# Patient Record
Sex: MALE | Race: WHITE | NOT HISPANIC OR LATINO | Employment: UNEMPLOYED | URBAN - METROPOLITAN AREA
[De-identification: names, ages, dates, MRNs, and addresses within clinical notes are randomized per-mention and may not be internally consistent; named-entity substitution may affect disease eponyms.]

---

## 2020-02-04 ENCOUNTER — HOSPITAL ENCOUNTER (INPATIENT)
Facility: HOSPITAL | Age: 55
LOS: 3 days | Discharge: HOME | DRG: 189 | End: 2020-02-07
Attending: EMERGENCY MEDICINE | Admitting: EMERGENCY MEDICINE
Payer: MEDICAID

## 2020-02-04 DIAGNOSIS — J44.1 COPD WITH ACUTE EXACERBATION: Primary | ICD-10-CM

## 2020-02-04 DIAGNOSIS — Z72.0 TOBACCO ABUSE: ICD-10-CM

## 2020-02-04 DIAGNOSIS — J96.01 ACUTE RESPIRATORY FAILURE WITH HYPOXIA: ICD-10-CM

## 2020-02-04 DIAGNOSIS — J96.01 ACUTE RESPIRATORY FAILURE WITH HYPOXIA AND HYPERCAPNIA: ICD-10-CM

## 2020-02-04 DIAGNOSIS — I10 ESSENTIAL HYPERTENSION: ICD-10-CM

## 2020-02-04 DIAGNOSIS — R05.9 COUGH: ICD-10-CM

## 2020-02-04 DIAGNOSIS — J96.02 ACUTE RESPIRATORY FAILURE WITH HYPOXIA AND HYPERCAPNIA: ICD-10-CM

## 2020-02-04 DIAGNOSIS — R09.02 HYPOXIA: ICD-10-CM

## 2020-02-04 DIAGNOSIS — J44.1 COPD EXACERBATION: ICD-10-CM

## 2020-02-04 PROBLEM — J44.9 COPD (CHRONIC OBSTRUCTIVE PULMONARY DISEASE): Status: ACTIVE | Noted: 2020-02-04

## 2020-02-04 LAB
ALBUMIN SERPL BCP-MCNC: 3.9 G/DL (ref 3.5–5.2)
ALLENS TEST: ABNORMAL
ALP SERPL-CCNC: 80 U/L (ref 55–135)
ALT SERPL W/O P-5'-P-CCNC: 12 U/L (ref 10–44)
ANION GAP SERPL CALC-SCNC: 9 MMOL/L (ref 8–16)
AST SERPL-CCNC: 11 U/L (ref 10–40)
BASOPHILS # BLD AUTO: 0.06 K/UL (ref 0–0.2)
BASOPHILS NFR BLD: 0.7 % (ref 0–1.9)
BILIRUB SERPL-MCNC: 0.3 MG/DL (ref 0.1–1)
BNP SERPL-MCNC: <10 PG/ML (ref 0–99)
BUN SERPL-MCNC: 19 MG/DL (ref 6–20)
CALCIUM SERPL-MCNC: 9.3 MG/DL (ref 8.7–10.5)
CHLORIDE SERPL-SCNC: 98 MMOL/L (ref 95–110)
CO2 SERPL-SCNC: 38 MMOL/L (ref 23–29)
CREAT SERPL-MCNC: 1 MG/DL (ref 0.5–1.4)
D DIMER PPP IA.FEU-MCNC: 0.3 MG/L FEU
DELSYS: ABNORMAL
DIFFERENTIAL METHOD: ABNORMAL
EOSINOPHIL # BLD AUTO: 0.3 K/UL (ref 0–0.5)
EOSINOPHIL NFR BLD: 3 % (ref 0–8)
ERYTHROCYTE [DISTWIDTH] IN BLOOD BY AUTOMATED COUNT: 13.8 % (ref 11.5–14.5)
EST. GFR  (AFRICAN AMERICAN): >60 ML/MIN/1.73 M^2
EST. GFR  (NON AFRICAN AMERICAN): >60 ML/MIN/1.73 M^2
FIO2: 94
FLOW: 4
GLUCOSE SERPL-MCNC: 114 MG/DL (ref 70–110)
HCO3 UR-SCNC: 38.2 MMOL/L (ref 24–28)
HCT VFR BLD AUTO: 52.9 % (ref 40–54)
HGB BLD-MCNC: 15.4 G/DL (ref 14–18)
IMM GRANULOCYTES # BLD AUTO: 0.05 K/UL (ref 0–0.04)
IMM GRANULOCYTES NFR BLD AUTO: 0.6 % (ref 0–0.5)
LYMPHOCYTES # BLD AUTO: 1.3 K/UL (ref 1–4.8)
LYMPHOCYTES NFR BLD: 14.9 % (ref 18–48)
MCH RBC QN AUTO: 28.9 PG (ref 27–31)
MCHC RBC AUTO-ENTMCNC: 29.1 G/DL (ref 32–36)
MCV RBC AUTO: 99 FL (ref 82–98)
MODE: ABNORMAL
MONOCYTES # BLD AUTO: 0.7 K/UL (ref 0.3–1)
MONOCYTES NFR BLD: 7.9 % (ref 4–15)
NEUTROPHILS # BLD AUTO: 6.3 K/UL (ref 1.8–7.7)
NEUTROPHILS NFR BLD: 72.9 % (ref 38–73)
NRBC BLD-RTO: 0 /100 WBC
PCO2 BLDA: 73.2 MMHG (ref 35–45)
PH SMN: 7.33 [PH] (ref 7.35–7.45)
PLATELET # BLD AUTO: 259 K/UL (ref 150–350)
PMV BLD AUTO: 9.6 FL (ref 9.2–12.9)
PO2 BLDA: 71 MMHG (ref 80–100)
POC BE: 12 MMOL/L
POC SATURATED O2: 92 % (ref 95–100)
POC TCO2: 40 MMOL/L (ref 23–27)
POCT GLUCOSE: 156 MG/DL (ref 70–110)
POTASSIUM SERPL-SCNC: 4.2 MMOL/L (ref 3.5–5.1)
PROT SERPL-MCNC: 7.5 G/DL (ref 6–8.4)
RBC # BLD AUTO: 5.33 M/UL (ref 4.6–6.2)
SAMPLE: ABNORMAL
SITE: ABNORMAL
SODIUM SERPL-SCNC: 145 MMOL/L (ref 136–145)
TROPONIN I SERPL DL<=0.01 NG/ML-MCNC: <0.006 NG/ML (ref 0–0.03)
WBC # BLD AUTO: 8.63 K/UL (ref 3.9–12.7)

## 2020-02-04 PROCEDURE — 36600 WITHDRAWAL OF ARTERIAL BLOOD: CPT

## 2020-02-04 PROCEDURE — 83880 ASSAY OF NATRIURETIC PEPTIDE: CPT

## 2020-02-04 PROCEDURE — 99222 1ST HOSP IP/OBS MODERATE 55: CPT | Mod: ,,, | Performed by: INTERNAL MEDICINE

## 2020-02-04 PROCEDURE — 11000001 HC ACUTE MED/SURG PRIVATE ROOM

## 2020-02-04 PROCEDURE — 25000242 PHARM REV CODE 250 ALT 637 W/ HCPCS: Performed by: INTERNAL MEDICINE

## 2020-02-04 PROCEDURE — 93005 ELECTROCARDIOGRAM TRACING: CPT

## 2020-02-04 PROCEDURE — 80053 COMPREHEN METABOLIC PANEL: CPT

## 2020-02-04 PROCEDURE — 27000221 HC OXYGEN, UP TO 24 HOURS

## 2020-02-04 PROCEDURE — 85379 FIBRIN DEGRADATION QUANT: CPT

## 2020-02-04 PROCEDURE — 94761 N-INVAS EAR/PLS OXIMETRY MLT: CPT

## 2020-02-04 PROCEDURE — 94640 AIRWAY INHALATION TREATMENT: CPT

## 2020-02-04 PROCEDURE — 82803 BLOOD GASES ANY COMBINATION: CPT

## 2020-02-04 PROCEDURE — 99285 EMERGENCY DEPT VISIT HI MDM: CPT | Mod: 25

## 2020-02-04 PROCEDURE — 84484 ASSAY OF TROPONIN QUANT: CPT

## 2020-02-04 PROCEDURE — 94760 N-INVAS EAR/PLS OXIMETRY 1: CPT

## 2020-02-04 PROCEDURE — 63600175 PHARM REV CODE 636 W HCPCS: Performed by: INTERNAL MEDICINE

## 2020-02-04 PROCEDURE — 25000242 PHARM REV CODE 250 ALT 637 W/ HCPCS: Performed by: PHYSICIAN ASSISTANT

## 2020-02-04 PROCEDURE — 99222 PR INITIAL HOSPITAL CARE,LEVL II: ICD-10-PCS | Mod: ,,, | Performed by: INTERNAL MEDICINE

## 2020-02-04 PROCEDURE — 25000003 PHARM REV CODE 250: Performed by: PHYSICIAN ASSISTANT

## 2020-02-04 PROCEDURE — 63600175 PHARM REV CODE 636 W HCPCS: Performed by: PHYSICIAN ASSISTANT

## 2020-02-04 PROCEDURE — 25000003 PHARM REV CODE 250: Performed by: INTERNAL MEDICINE

## 2020-02-04 PROCEDURE — 93010 ELECTROCARDIOGRAM REPORT: CPT | Mod: ,,, | Performed by: INTERNAL MEDICINE

## 2020-02-04 PROCEDURE — 99900035 HC TECH TIME PER 15 MIN (STAT)

## 2020-02-04 PROCEDURE — 99285 PR EMERGENCY DEPT VISIT,LEVEL V: ICD-10-PCS | Mod: ,,, | Performed by: PHYSICIAN ASSISTANT

## 2020-02-04 PROCEDURE — 99285 EMERGENCY DEPT VISIT HI MDM: CPT | Mod: ,,, | Performed by: PHYSICIAN ASSISTANT

## 2020-02-04 PROCEDURE — 93010 EKG 12-LEAD: ICD-10-PCS | Mod: ,,, | Performed by: INTERNAL MEDICINE

## 2020-02-04 PROCEDURE — 85025 COMPLETE CBC W/AUTO DIFF WBC: CPT

## 2020-02-04 RX ORDER — ACETAMINOPHEN 325 MG/1
650 TABLET ORAL EVERY 6 HOURS PRN
Status: DISCONTINUED | OUTPATIENT
Start: 2020-02-04 | End: 2020-02-07 | Stop reason: HOSPADM

## 2020-02-04 RX ORDER — TIOTROPIUM BROMIDE 18 UG/1
1 CAPSULE ORAL; RESPIRATORY (INHALATION) DAILY
Status: DISCONTINUED | OUTPATIENT
Start: 2020-02-04 | End: 2020-02-07 | Stop reason: HOSPADM

## 2020-02-04 RX ORDER — GLUCAGON 1 MG
1 KIT INJECTION
Status: DISCONTINUED | OUTPATIENT
Start: 2020-02-04 | End: 2020-02-07 | Stop reason: HOSPADM

## 2020-02-04 RX ORDER — PREDNISONE 20 MG/1
40 TABLET ORAL DAILY
Qty: 8 TABLET | Refills: 0 | Status: SHIPPED | OUTPATIENT
Start: 2020-02-05 | End: 2020-02-07 | Stop reason: SDUPTHER

## 2020-02-04 RX ORDER — PREDNISONE 10 MG/1
40 TABLET ORAL DAILY
Status: DISCONTINUED | OUTPATIENT
Start: 2020-02-05 | End: 2020-02-07 | Stop reason: HOSPADM

## 2020-02-04 RX ORDER — BENZONATATE 100 MG/1
100 CAPSULE ORAL
Status: COMPLETED | OUTPATIENT
Start: 2020-02-04 | End: 2020-02-04

## 2020-02-04 RX ORDER — BENZONATATE 100 MG/1
200 CAPSULE ORAL 3 TIMES DAILY PRN
Status: DISCONTINUED | OUTPATIENT
Start: 2020-02-05 | End: 2020-02-07 | Stop reason: HOSPADM

## 2020-02-04 RX ORDER — IPRATROPIUM BROMIDE AND ALBUTEROL SULFATE 2.5; .5 MG/3ML; MG/3ML
3 SOLUTION RESPIRATORY (INHALATION)
Status: COMPLETED | OUTPATIENT
Start: 2020-02-04 | End: 2020-02-04

## 2020-02-04 RX ORDER — INSULIN ASPART 100 [IU]/ML
0-5 INJECTION, SOLUTION INTRAVENOUS; SUBCUTANEOUS
Status: DISCONTINUED | OUTPATIENT
Start: 2020-02-04 | End: 2020-02-05

## 2020-02-04 RX ORDER — BENZONATATE 100 MG/1
200 CAPSULE ORAL 3 TIMES DAILY
Status: COMPLETED | OUTPATIENT
Start: 2020-02-04 | End: 2020-02-05

## 2020-02-04 RX ORDER — TIOTROPIUM BROMIDE 18 UG/1
18 CAPSULE ORAL; RESPIRATORY (INHALATION) DAILY
Qty: 30 CAPSULE | Refills: 0 | Status: SHIPPED | OUTPATIENT
Start: 2020-02-04 | End: 2020-02-07 | Stop reason: SDUPTHER

## 2020-02-04 RX ORDER — LISINOPRIL 20 MG/1
20 TABLET ORAL DAILY
Status: ON HOLD | COMMUNITY
End: 2020-02-07 | Stop reason: SDUPTHER

## 2020-02-04 RX ORDER — ALBUTEROL SULFATE 90 UG/1
2 AEROSOL, METERED RESPIRATORY (INHALATION) EVERY 6 HOURS PRN
Status: DISCONTINUED | OUTPATIENT
Start: 2020-02-04 | End: 2020-02-07 | Stop reason: HOSPADM

## 2020-02-04 RX ORDER — ALBUTEROL SULFATE 0.63 MG/3ML
0.63 SOLUTION RESPIRATORY (INHALATION) EVERY 6 HOURS PRN
Status: ON HOLD | COMMUNITY
End: 2020-02-07 | Stop reason: HOSPADM

## 2020-02-04 RX ORDER — ONDANSETRON 8 MG/1
8 TABLET, ORALLY DISINTEGRATING ORAL EVERY 8 HOURS PRN
Status: DISCONTINUED | OUTPATIENT
Start: 2020-02-04 | End: 2020-02-07 | Stop reason: HOSPADM

## 2020-02-04 RX ORDER — PREDNISONE 20 MG/1
40 TABLET ORAL
Status: COMPLETED | OUTPATIENT
Start: 2020-02-04 | End: 2020-02-04

## 2020-02-04 RX ORDER — SODIUM CHLORIDE 0.9 % (FLUSH) 0.9 %
10 SYRINGE (ML) INJECTION
Status: DISCONTINUED | OUTPATIENT
Start: 2020-02-04 | End: 2020-02-07 | Stop reason: HOSPADM

## 2020-02-04 RX ORDER — HYDROCHLOROTHIAZIDE 25 MG/1
25 TABLET ORAL DAILY
Status: DISCONTINUED | OUTPATIENT
Start: 2020-02-05 | End: 2020-02-07 | Stop reason: HOSPADM

## 2020-02-04 RX ORDER — IBUPROFEN 200 MG
24 TABLET ORAL
Status: DISCONTINUED | OUTPATIENT
Start: 2020-02-04 | End: 2020-02-07 | Stop reason: HOSPADM

## 2020-02-04 RX ORDER — AZITHROMYCIN 250 MG/1
250 TABLET, FILM COATED ORAL DAILY
Qty: 6 TABLET | Refills: 0 | Status: SHIPPED | OUTPATIENT
Start: 2020-02-04 | End: 2020-02-07 | Stop reason: HOSPADM

## 2020-02-04 RX ORDER — HYDROCHLOROTHIAZIDE 25 MG/1
25 TABLET ORAL DAILY
Status: ON HOLD | COMMUNITY
End: 2020-02-07 | Stop reason: HOSPADM

## 2020-02-04 RX ORDER — IPRATROPIUM BROMIDE AND ALBUTEROL SULFATE 2.5; .5 MG/3ML; MG/3ML
3 SOLUTION RESPIRATORY (INHALATION) EVERY 4 HOURS
Status: DISCONTINUED | OUTPATIENT
Start: 2020-02-04 | End: 2020-02-07

## 2020-02-04 RX ORDER — IBUPROFEN 200 MG
16 TABLET ORAL
Status: DISCONTINUED | OUTPATIENT
Start: 2020-02-04 | End: 2020-02-07 | Stop reason: HOSPADM

## 2020-02-04 RX ORDER — ALBUTEROL SULFATE 90 UG/1
1-2 AEROSOL, METERED RESPIRATORY (INHALATION) EVERY 6 HOURS PRN
Qty: 6.7 G | Refills: 2 | Status: SHIPPED | OUTPATIENT
Start: 2020-02-04 | End: 2020-02-07 | Stop reason: HOSPADM

## 2020-02-04 RX ORDER — ENOXAPARIN SODIUM 100 MG/ML
40 INJECTION SUBCUTANEOUS EVERY 24 HOURS
Status: DISCONTINUED | OUTPATIENT
Start: 2020-02-04 | End: 2020-02-07 | Stop reason: HOSPADM

## 2020-02-04 RX ORDER — LISINOPRIL 20 MG/1
20 TABLET ORAL DAILY
Status: DISCONTINUED | OUTPATIENT
Start: 2020-02-05 | End: 2020-02-04

## 2020-02-04 RX ADMIN — ALBUTEROL SULFATE 2 PUFF: 90 AEROSOL, METERED RESPIRATORY (INHALATION) at 10:02

## 2020-02-04 RX ADMIN — IPRATROPIUM BROMIDE AND ALBUTEROL SULFATE 3 ML: .5; 3 SOLUTION RESPIRATORY (INHALATION) at 12:02

## 2020-02-04 RX ADMIN — BENZONATATE 200 MG: 100 CAPSULE ORAL at 10:02

## 2020-02-04 RX ADMIN — IPRATROPIUM BROMIDE AND ALBUTEROL SULFATE 3 ML: .5; 3 SOLUTION RESPIRATORY (INHALATION) at 08:02

## 2020-02-04 RX ADMIN — ENOXAPARIN SODIUM 40 MG: 100 INJECTION SUBCUTANEOUS at 05:02

## 2020-02-04 RX ADMIN — PREDNISONE 40 MG: 20 TABLET ORAL at 08:02

## 2020-02-04 RX ADMIN — BENZONATATE 100 MG: 100 CAPSULE ORAL at 08:02

## 2020-02-04 RX ADMIN — BENZONATATE 200 MG: 100 CAPSULE ORAL at 02:02

## 2020-02-04 RX ADMIN — TIOTROPIUM BROMIDE 18 MCG: 18 CAPSULE ORAL; RESPIRATORY (INHALATION) at 05:02

## 2020-02-04 RX ADMIN — IPRATROPIUM BROMIDE AND ALBUTEROL SULFATE 3 ML: .5; 3 SOLUTION RESPIRATORY (INHALATION) at 07:02

## 2020-02-04 RX ADMIN — IPRATROPIUM BROMIDE AND ALBUTEROL SULFATE 3 ML: .5; 3 SOLUTION RESPIRATORY (INHALATION) at 05:02

## 2020-02-04 NOTE — ED PROVIDER NOTES
Encounter Date: 2/4/2020       History     Chief Complaint   Patient presents with    URI     ran out inhaler,      54-year-old male with COPD and hypertension presents for a cough productive of yellow sputum for 1 week.  He ran out of his albuterol inhaler which was helpful for his cough.  He reports associated wheezing.  He denies shortness of breath, chest pain, fever, leg swelling, sinus congestion, sore throat or other symptoms. He is currently living at the [facility], he denies any known sick contacts or recent travel.        Review of patient's allergies indicates:   Allergen Reactions    Codeine Rash    Penicillins Rash     Past Medical History:   Diagnosis Date    COPD (chronic obstructive pulmonary disease) 2/4/2020    Hypertension      Past Surgical History:   Procedure Laterality Date    HERNIA REPAIR      LEG SURGERY       History reviewed. No pertinent family history.  Social History     Tobacco Use    Smoking status: Current Every Day Smoker     Packs/day: 0.50     Types: Cigarettes    Smokeless tobacco: Never Used    Tobacco comment: 10 cigarettes per day   Substance Use Topics    Alcohol use: Never     Frequency: Never     Comment: In [facility] [place] [facility] [employer] [facility]    Drug use: Not on file     Review of Systems   Constitutional: Negative for chills, fatigue and fever.   HENT: Negative for congestion, rhinorrhea and sore throat.    Respiratory: Positive for cough and wheezing. Negative for shortness of breath.    Cardiovascular: Negative for chest pain, palpitations and leg swelling.   Gastrointestinal: Negative for nausea.   Genitourinary: Negative for dysuria.   Musculoskeletal: Negative for back pain.   Skin: Negative for rash.   Allergic/Immunologic: Negative for immunocompromised state.   Neurological: Negative for weakness.   Hematological: Does not bruise/bleed easily.       Physical Exam     Initial Vitals [02/04/20 0752]   BP Pulse Resp Temp SpO2   (!) 154/85 105 (!) 22 97.9 °F (36.6 °C) 95 %       MAP       --         Physical Exam    Nursing note and vitals reviewed.  Constitutional: He appears well-developed and well-nourished. He is not diaphoretic. No distress.   HENT:   Head: Normocephalic and atraumatic.   Eyes: EOM are normal. Pupils are equal, round, and reactive to light.   Neck: Normal range of motion. Neck supple.   Cardiovascular: Normal rate, regular rhythm, normal heart sounds and intact distal pulses. Exam reveals no gallop and no friction rub.    No murmur heard.  No lower extremity edema, erythema, tenderness or warmth   Pulmonary/Chest: No respiratory distress. He has wheezes. He has no rhonchi. He has no rales. He exhibits no tenderness.   Speaking in full sentences without difficulty   Musculoskeletal: Normal range of motion.   Neurological: He is alert and oriented to person, place, and time.   Skin: Skin is warm and dry.   Psychiatric: He has a normal mood and affect.         ED Course   Procedures  Labs Reviewed   CBC W/ AUTO DIFFERENTIAL - Abnormal; Notable for the following components:       Result Value    Mean Corpuscular Volume 99 (*)     Mean Corpuscular Hemoglobin Conc 29.1 (*)     Immature Granulocytes 0.6 (*)     Immature Grans (Abs) 0.05 (*)     Lymph% 14.9 (*)     All other components within normal limits   COMPREHENSIVE METABOLIC PANEL - Abnormal; Notable for the following components:    CO2 38 (*)     Glucose 114 (*)     All other components within normal limits   ISTAT PROCEDURE - Abnormal; Notable for the following components:    POC PH 7.325 (*)     POC PCO2 73.2 (*)     POC PO2 71 (*)     POC HCO3 38.2 (*)     POC SATURATED O2 92 (*)     POC TCO2 40 (*)     All other components within normal limits   INFLUENZA A & B BY MOLECULAR   TROPONIN I   B-TYPE NATRIURETIC PEPTIDE   D DIMER, QUANTITATIVE     EKG Readings: (Independently Interpreted)   Initial Reading: No STEMI. Rhythm: Normal Sinus Rhythm. Heart Rate: 96. Ectopy: No Ectopy. Conduction: Normal. ST Segments:  Normal ST Segments. T Waves: Normal. Clinical Impression: Normal Sinus Rhythm     ECG Results          EKG 12-lead (In process)  Result time 02/04/20 11:52:03    In process by Interface, Lab In [facility] (02/04/20 11:52:03)                 Narrative:    Test Reason : R09.02,    Vent. Rate : 096 BPM     Atrial Rate : 096 BPM     P-R Int : 126 ms          QRS Dur : 086 ms      QT Int : 328 ms       P-R-T Axes : 064 079 014 degrees     QTc Int : 414 ms    Age and gender specific analysis  Normal sinus rhythm  Normal ECG  No previous ECGs available    Referred By: [email or web address]   SELF           Confirmed By:                             Imaging Results          X-Ray Chest PA And Lateral (Final result)  Result time 02/04/20 08:23:28    Final result by Munira Casas MD (02/04/20 08:23:28)                 Impression:      I detect no convincing evidence of disease allowing for mild elevation of the right hemidiaphragm.      Electronically signed by: Munira Casas MD  Date:    02/04/2020  Time:    08:23             Narrative:    EXAMINATION:  XR CHEST PA AND LATERAL    CLINICAL HISTORY:  Cough    TECHNIQUE:  PA and lateral views of the chest were performed.    COMPARISON:  None    FINDINGS:  Mediastinal structures are midline. Cardiac silhouette and pulmonary vascular distribution are normal.    There is mild elevation of the right hemidiaphragm possibly due to eventration.  Allowing for this, pulmonary volumes appear unremarkable.    Taking both views into account I detect no convincing evidence of pneumonia or other pulmonary disease in this patient with a history of cough.    I detect no pleural fluid, lymph node enlargement, cardiac decompensation, pneumothorax, pneumomediastinum, pneumoperitoneum or significant osseous abnormality.  Degenerative changes are present at the acromioclavicular joints.                                 Medical Decision Making:   History:   Old Medical Records: I decided to obtain old medical  records.  Initial Assessment:   54-year-old male COPD presenting for cough for 1 week.  Denies any shortness of breath, chest pain or fevers /85, mildly tachycardic at 105, O2 saturation 95% on room air.  He is not tachypneic on my exam and is speaking in full sentences without difficulty.  Lungs notable for expiratory wheezes.  Differential Diagnosis:   COPD exacerbation  URI  I doubt pneumonia  I considered cardiac etiology but feel this is unlikely given no history, no chest pain and no signs of fluid overload  I considered influenza but I feel this is unlikely given no fever  Independently Interpreted Test(s):   I have ordered and independently interpreted X-rays - see summary below.  I have ordered and independently interpreted EKG Reading(s) - see prior notes  Clinical Tests:   Lab Tests: Ordered and Reviewed  Radiological Study: Ordered and Reviewed  Medical Tests: Ordered and Reviewed  ED Management:  Will give prednisone, Tessalon Perle, duo nebs, do chest x-ray and reassess.    Patient reports improvement of cough and wheezing after duo nebs.  I was preparing the patient for discharge when his O2 saturation was noted to be 76% on room air.  This was rechecked multiple times by nursing staff with a good waveform.  I suspect that the triage O2 saturation was inaccurate given his considerable hypoxia currently.  Will place on oxygen, transfer patient to main ED bed, check labs and reassess.      ABG notable for hypercarbia with pCO2 of 73.2.  Hypoxia with saturated O2 of 92 while the patient is on oxygen.  Otherwise his labs are unremarkable with negative D-dimer.  Patient will be admitted to Hospital Medicine for further workup.  Per patient request, I contacted Mr. Goyal at the [facility] [phone] states that the patient will be admitted.  Patient comfortable with admission.  I discussed this patient with my supervising physician.  Other:   I have discussed this case with another health care  provider.       <> Summary of the Discussion: Discussed this patient with [facility] recommends obtaining D-dimer NSAID a CTA given patient's low risk profile.              Attending Attestation:     Physician Attestation Statement for NP/PA:   I discussed this assessment and plan of this patient with the NP/PA, but I did not personally examine the patient. The face to face encounter was performed by the NP/PA.                  ED Course as of Feb 04 1242   Tue Feb 04, 2020   0832 No consolidation or pulmonary edema   X-Ray Chest PA And Lateral [CC]   1022 Patient hypercarbic pCO2 73.2.  Suspect chronic.  P O2 92%, patient currently on oxygen.   ISTAT PROCEDURE(!!) [CC]   1033 No leukocytosis or anemia   CBC auto differential(!) [CC]   1100 Troponin negative   Troponin I [CC]   1100 Hypercarbia, otherwise unremarkable   Comprehensive metabolic panel(!) [CC]   1218 D-dimer negative. I suspect profound hypoxia is secondary to COPD.   D dimer, quantitative [CC]      ED Course User Index  [CC] Miriam Gillespie PA-C                Clinical Impression:       ICD-10-CM ICD-9-CM   1. Hypoxia R09.02 799.02   2. Cough R05 786.2   3. COPD exacerbation J44.1 491.21   4. Acute respiratory failure with hypoxia J96.01 518.81         Disposition:   Disposition: Admitted  Condition: Fair                     Miriam Gillespie PA-C  02/04/20 1240       Gray Baker MD  02/04/20 1242

## 2020-02-04 NOTE — DISCHARGE INSTRUCTIONS
I suspect that your symptoms are caused by COPD which is a chronic lung disease caused by smoking.  COPD causes your lungs to swell which makes breathing more difficult.  I am prescribing a steroid to help improve this as well as an albuterol inhaler and a short course of antibiotics.    Please schedule an appointment with your primary care doctor for follow-up.  If you start to have any worsening shortness of breath, chest pain or high fever please return to the emergency department.

## 2020-02-04 NOTE — ED TRIAGE NOTES
Patient states cough with yellow sputum x 1 month, h/o COPD out of Albuterol x 2 weeks. Currently taking Spiriva(not his rx)  Denies fevers. Denies SOB except at night when lying down

## 2020-02-04 NOTE — ED NOTES
RN at bedside to re-check patient vital signs, patient found to be hypoxic at 77% on room air. NAD noted. Patient denies shortness of breath. Patient placed on 4L NC per RN. PA Link notified.

## 2020-02-04 NOTE — ED NOTES
Patient titrated to 2L NC and maintained oxygen saturation at 88%. PA Link notified and patient titrated to 3L NC.

## 2020-02-04 NOTE — H&P
Hospital Medicine  History and Physical      Patient Name: Kt Lutz  MRN: [id number]  Date of Admission: 2/4/2020     Principal Problem: Acute respiratory failure with hypoxia     Chief Complaint     Shortness of breath    History of Present Illness    Mr. Albright is a 54-year-old man with tobacco abuse, HTN and COPD who presents with shortness of breath. This is his third presentation for similar complaints in the last year, with a most recent admission being around the time of Thanksgiving, at which time he reports presenting with chest pain and SOB and was admitted to [facility] for ACS rule-out culminating in a negative stress test and diagnosis of COPD. He has an extended history of tobacco abuse starting when he was 16 and continuing to today. He currently smokes about 1/2 PPD, though throughout his 30s and 40s smoked upwards of 2-3, essentially continuously. He has been in his usual state of health until one month ago when he developed a nagging dry cough, which worsened over the last week after he ran out of the albuterol prescribed from his recent hospitalization. No sputum changes. He borrowed some spiriva from his sister with no effect. No chest pain, palpitations, syncope, or edema. He does not feel dyspneic No fever, chills, or NS. He lives at the [facility] with numerous sick contacts.    On arrival here he was hypoxic to to 78 on RA, improving to high 90s on 4L O2. His lab workup was largely normal, including D-Dimer at 0.30, with the exception of CO2 elevation at 38 and ABG shows hypoxic and hypercapnic respiratory failure at 7.325/73/71 on 4L via NC. CXR clear. He was given duoneb x3, prednisone, and tessalon. Despite the hypoxia he has remained asymptomatic. His only complaint currently is a desire to leave the ED to smoke. Admission requested for COPD exacerbation.     Review of Systems    Constitutional: Negative for chills, fatigue, fever.   HENT: Negative for sore throat, trouble swallowing.     Eyes: Negative for photophobia, visual disturbance.   Respiratory: +cough Negative for shortness of breath.    Cardiovascular: Negative for chest pain, palpitations, leg swelling.   Gastrointestinal: Negative for abdominal pain, constipation, diarrhea, nausea, vomiting.   Endocrine: Negative for cold intolerance, heat intolerance.   Genitourinary: Negative for dysuria, frequency.   Musculoskeletal: Negative for arthralgias, myalgias.   Skin: Negative for rash, wound, erythema   Neurological: Negative for dizziness, syncope, weakness, light-headedness.   Psychiatric/Behavioral: Negative for confusion, hallucinations, anxiety    Past Medical History:   Diagnosis Date    COPD (chronic obstructive pulmonary disease) 2/4/2020    Hypertension      Past Surgical History:   Procedure Laterality Date    HERNIA REPAIR      LEG SURGERY       History reviewed. No pertinent family history.  Social History     Socioeconomic History    Marital status: Single     Spouse name: Not on file    Number of children: Not on file    Years of education: Not on file    Highest education level: Not on file   Occupational History    Not on file   Social Needs    Financial resource strain: Not on file    Food insecurity:     Worry: Not on file     Inability: Not on file    Transportation needs:     Medical: Not on file     Non-medical: Not on file   Tobacco Use    Smoking status: Current Every Day Smoker     Packs/day: 0.50     Types: Cigarettes    Smokeless tobacco: Never Used    Tobacco comment: 10 cigarettes per day   Substance and Sexual Activity    Alcohol use: Never     Frequency: Never     Comment: In [employer] program    Drug use: Not on file    Sexual activity: Never   Lifestyle    Physical activity:     Days per week: Not on file     Minutes per session: Not on file    Stress: Not on file   Relationships    Social connections:     Talks on phone: Not on file     Gets together: Not on file     Attends  [employer] service: Not on file     Active member of club or organization: Not on file     Attends meetings of clubs or organizations: Not on file     Relationship status: Not on file   Other Topics Concern    Not on file   Social History Narrative    Not on file     Medications  Scheduled Meds:   albuterol-ipratropium  3 mL Nebulization Q4H    benzonatate  200 mg Oral TID    enoxaparin  40 mg Subcutaneous Daily    [START ON 2/5/2020] hydroCHLOROthiazide  25 mg Oral Daily    [START ON 2/5/2020] lisinopril  20 mg Oral Daily    [START ON 2/5/2020] predniSONE  40 mg Oral Daily    tiotropium  1 capsule Inhalation Daily     Continuous Infusions:  PRN Meds:.acetaminophen, albuterol, benzonatate **FOLLOWED BY** [START ON 2/5/2020] benzonatate, Dextrose 10% Bolus, Dextrose 10% Bolus, glucagon (human recombinant), glucose, glucose, insulin aspart U-100, ondansetron, promethazine (PHENERGAN) IVPB, sodium chloride 0.9%    Allergies  Codeine and Penicillins    Physical Examination    Temp:  [97.9 °F (36.6 °C)]   Pulse:  []   Resp:  [16-22]   BP: (118-154)/(70-85)   SpO2:  [78 %-95 %]     Gen: NAD, conversant with full sentences, smells of tobacco  Head: NC, AT  Eyes: PERRLA , EOMI  Throat: MMM, OP clear  CV: RRR, no M/R/G, no peripheral edema, no JVD  Resp: Distant breath sounds with bilateral end-expiratory wheezes, worst on the right, no increased work of breathing on 4L via NC  GI: Soft, NT, ND, +BS  Ext: MAEW, no c/c/e  Neuro: AAOx3, CN grossly intact, no focal neurologic deficits  Psychiatry: Normal mood, normal affect    CBC  Recent Labs   Lab 02/04/20  1000   WBC 8.63   HGB 15.4   HCT 52.9        CMP  Recent Labs   Lab 02/04/20  1000      K 4.2   CL 98   CO2 38*   BUN 19   CREATININE 1.0   *   CALCIUM 9.3   ALKPHOS 80   ALT 12   AST 11   ALBUMIN 3.9   PROT 7.5   BILITOT 0.3       Assessment and Plan:    Acute respiratory failure with hypoxia and hypercapnia    - Asymptomatic  - Suspect  "that he also has underlying chronic respiratory failure as evidence by his ABG with mostly hypercapnia largely compensated by metabolic alkalosis on admission BMP at 38  - No chest pain or lower extremity edema to suggest DVT/PE. Wells PA low risk at 1.5  (points for HR > 100 on arrival) which confers a 1.3% risk of PE in ED population. Negative D-dimer also reassuring  - No f/c/NS to suggest PNA, and CXR clear  - Biochemical and ECG investigation of cardiac contribution negative with BNP and troponin both undetectable and ECG showing NSR with no ischemic changes. Reportedly normal stress also reassuring  - ABG shows hypoxia and compensated hypercapnea at 7.325/73/71 on 4L via NC  - Needs PFTs as an outpatient to characterize further  - Continue 4L oxygen for now, will wean as tolerated. SpO2 continuous ordered with goal > 88%    COPD with acute exacerbation    - Likely precipitated by viral URI. No myalgias or fever to suggest flu  - Started duonebs Q4H. Albuterol PRN for break through  - Continue prednisone 40 mg daily with plans for a four-day course  - Antibiotics not indicated  - Tessalon for cough  - Started tiotropium daily  - Rest per "Acute respiratory failure with hypoxia and hypercapnia" above    Metabolic alkalosis    - Likely compensatory from chronic hypercapnic respiratory failure 2/2 COPD  - BMP daily    Essential hypertension    - Admission /85, improved to 118/73 without specific intervention  - Continue home HCTZ 12.5 mg daily  - 2g Na restriction    Tobacco abuse    - Counseled at length (> 10 minutes) regarding the benefits of cessation and risks of ongoing use. He's strongly in pre-contemplation stage, stating that he would "rather die" than quit smoking, and is already requesting he leave to go smoke  - Counseled against continued smoking while on O2  - He refused pharmacologic options while inpatient    Diet: 2g Na restriction  VTE PPX: Enoxaparin    Goals of care: Curative, full code  "     Chinmay Tay M.D.  Department of [facility]  [facility]  [phone] (pager)

## 2020-02-04 NOTE — ED NOTES
Patient identifiers verified and correct for Mr Lutz  C/C: Cough SEE NN  APPEARANCE: awake and alert in NAD.  SKIN: warm, dry and intact. No breakdown or bruising.  MUSCULOSKELETAL: Patient moving all extremities spontaneously, no obvious swelling or deformities noted. Ambulates independently.  RESPIRATORY: Denies shortness of breath.Respirations unlabored. Positive cough with white sputum, Breath Sounds decr t/o, min wheezes  CARDIAC: Denies CP, 2+ distal pulses; no peripheral edema  ABDOMEN: S/ND/NT, Denies nausea  : voids spontaneously, denies difficulty  Neurologic: AAO x 4; follows commands equal strength in all extremities; denies numbness/tingling. Denies dizziness Denies weakness,

## 2020-02-04 NOTE — ED NOTES
Patient 93% oxygen saturation on 4L NC. Patient still denies shortness of breath. Charge nurse notified of need for room.

## 2020-02-05 LAB
ANION GAP SERPL CALC-SCNC: 10 MMOL/L (ref 8–16)
BASOPHILS # BLD AUTO: 0.03 K/UL (ref 0–0.2)
BASOPHILS NFR BLD: 0.3 % (ref 0–1.9)
BUN SERPL-MCNC: 17 MG/DL (ref 6–20)
CALCIUM SERPL-MCNC: 8.2 MG/DL (ref 8.7–10.5)
CHLORIDE SERPL-SCNC: 100 MMOL/L (ref 95–110)
CO2 SERPL-SCNC: 31 MMOL/L (ref 23–29)
CREAT SERPL-MCNC: 0.8 MG/DL (ref 0.5–1.4)
DIFFERENTIAL METHOD: ABNORMAL
EOSINOPHIL # BLD AUTO: 0.1 K/UL (ref 0–0.5)
EOSINOPHIL NFR BLD: 1 % (ref 0–8)
ERYTHROCYTE [DISTWIDTH] IN BLOOD BY AUTOMATED COUNT: 13.7 % (ref 11.5–14.5)
EST. GFR  (AFRICAN AMERICAN): >60 ML/MIN/1.73 M^2
EST. GFR  (NON AFRICAN AMERICAN): >60 ML/MIN/1.73 M^2
GLUCOSE SERPL-MCNC: 92 MG/DL (ref 70–110)
HCT VFR BLD AUTO: 47.5 % (ref 40–54)
HGB BLD-MCNC: 13.8 G/DL (ref 14–18)
IMM GRANULOCYTES # BLD AUTO: 0.07 K/UL (ref 0–0.04)
IMM GRANULOCYTES NFR BLD AUTO: 0.6 % (ref 0–0.5)
LYMPHOCYTES # BLD AUTO: 2 K/UL (ref 1–4.8)
LYMPHOCYTES NFR BLD: 18.5 % (ref 18–48)
MCH RBC QN AUTO: 28.9 PG (ref 27–31)
MCHC RBC AUTO-ENTMCNC: 29.1 G/DL (ref 32–36)
MCV RBC AUTO: 100 FL (ref 82–98)
MONOCYTES # BLD AUTO: 1 K/UL (ref 0.3–1)
MONOCYTES NFR BLD: 8.8 % (ref 4–15)
NEUTROPHILS # BLD AUTO: 7.7 K/UL (ref 1.8–7.7)
NEUTROPHILS NFR BLD: 70.8 % (ref 38–73)
NRBC BLD-RTO: 0 /100 WBC
PLATELET # BLD AUTO: 238 K/UL (ref 150–350)
PMV BLD AUTO: 10.1 FL (ref 9.2–12.9)
POCT GLUCOSE: 107 MG/DL (ref 70–110)
POTASSIUM SERPL-SCNC: 3.8 MMOL/L (ref 3.5–5.1)
RBC # BLD AUTO: 4.77 M/UL (ref 4.6–6.2)
SODIUM SERPL-SCNC: 141 MMOL/L (ref 136–145)
WBC # BLD AUTO: 10.85 K/UL (ref 3.9–12.7)

## 2020-02-05 PROCEDURE — 27000221 HC OXYGEN, UP TO 24 HOURS

## 2020-02-05 PROCEDURE — 25000003 PHARM REV CODE 250: Performed by: INTERNAL MEDICINE

## 2020-02-05 PROCEDURE — 99232 SBSQ HOSP IP/OBS MODERATE 35: CPT | Mod: ,,, | Performed by: INTERNAL MEDICINE

## 2020-02-05 PROCEDURE — 94640 AIRWAY INHALATION TREATMENT: CPT

## 2020-02-05 PROCEDURE — 99900035 HC TECH TIME PER 15 MIN (STAT)

## 2020-02-05 PROCEDURE — 36415 COLL VENOUS BLD VENIPUNCTURE: CPT

## 2020-02-05 PROCEDURE — 94761 N-INVAS EAR/PLS OXIMETRY MLT: CPT

## 2020-02-05 PROCEDURE — 85025 COMPLETE CBC W/AUTO DIFF WBC: CPT

## 2020-02-05 PROCEDURE — 63600175 PHARM REV CODE 636 W HCPCS: Performed by: INTERNAL MEDICINE

## 2020-02-05 PROCEDURE — 80048 BASIC METABOLIC PNL TOTAL CA: CPT

## 2020-02-05 PROCEDURE — 99232 PR SUBSEQUENT HOSPITAL CARE,LEVL II: ICD-10-PCS | Mod: ,,, | Performed by: INTERNAL MEDICINE

## 2020-02-05 PROCEDURE — 11000001 HC ACUTE MED/SURG PRIVATE ROOM

## 2020-02-05 PROCEDURE — 25000242 PHARM REV CODE 250 ALT 637 W/ HCPCS: Performed by: INTERNAL MEDICINE

## 2020-02-05 RX ORDER — FLUTICASONE FUROATE AND VILANTEROL 100; 25 UG/1; UG/1
1 POWDER RESPIRATORY (INHALATION) DAILY
Status: DISCONTINUED | OUTPATIENT
Start: 2020-02-05 | End: 2020-02-07 | Stop reason: HOSPADM

## 2020-02-05 RX ADMIN — PREDNISONE 40 MG: 10 TABLET ORAL at 08:02

## 2020-02-05 RX ADMIN — ACETAMINOPHEN 650 MG: 325 TABLET ORAL at 08:02

## 2020-02-05 RX ADMIN — FLUTICASONE FUROATE AND VILANTEROL TRIFENATATE 1 PUFF: 100; 25 POWDER RESPIRATORY (INHALATION) at 02:02

## 2020-02-05 RX ADMIN — BENZONATATE 200 MG: 100 CAPSULE ORAL at 08:02

## 2020-02-05 RX ADMIN — IPRATROPIUM BROMIDE AND ALBUTEROL SULFATE 3 ML: .5; 3 SOLUTION RESPIRATORY (INHALATION) at 11:02

## 2020-02-05 RX ADMIN — IPRATROPIUM BROMIDE AND ALBUTEROL SULFATE 3 ML: .5; 3 SOLUTION RESPIRATORY (INHALATION) at 04:02

## 2020-02-05 RX ADMIN — TIOTROPIUM BROMIDE 18 MCG: 18 CAPSULE ORAL; RESPIRATORY (INHALATION) at 09:02

## 2020-02-05 RX ADMIN — HYDROCHLOROTHIAZIDE 25 MG: 25 TABLET ORAL at 08:02

## 2020-02-05 RX ADMIN — IPRATROPIUM BROMIDE AND ALBUTEROL SULFATE 3 ML: .5; 3 SOLUTION RESPIRATORY (INHALATION) at 12:02

## 2020-02-05 RX ADMIN — IPRATROPIUM BROMIDE AND ALBUTEROL SULFATE 3 ML: .5; 3 SOLUTION RESPIRATORY (INHALATION) at 08:02

## 2020-02-05 RX ADMIN — ENOXAPARIN SODIUM 40 MG: 100 INJECTION SUBCUTANEOUS at 05:02

## 2020-02-05 RX ADMIN — IPRATROPIUM BROMIDE AND ALBUTEROL SULFATE 3 ML: .5; 3 SOLUTION RESPIRATORY (INHALATION) at 07:02

## 2020-02-05 NOTE — NURSING
Ordered continuous pulse ox cable from [facility].  Per Alvarado, she will place him on the waiting list and send it when one becomes available.

## 2020-02-05 NOTE — PROGRESS NOTES
Hospital Medicine  Progress Note      Patient Name: Kt Lutz  MRN: [id number]  Date of Admission: 2/4/2020     Principal Problem: Acute respiratory failure with hypoxia and hypercapnia     Subjective     Mr. Lutz continues to feel well. He has no dyspnea, and his cough has improved. His oxygen requirements have decreased from 4L -> 1-2L, but any further and he drops to the low 80s. He is asymptomatic throughout this. He is anxious to leave the hospital.    Review of Systems    Constitutional: Negative for chills, fatigue, fever.   Respiratory: +cough Negative for shortness of breath.    Cardiovascular: Negative for chest pain, palpitations, leg swelling.   Gastrointestinal: Negative for abdominal pain, constipation, diarrhea, nausea, vomiting.   Skin: Negative for rash, wound, erythema   Neurological: Negative for dizziness, syncope, weakness, light-headedness.     Medications  Scheduled Meds:   albuterol-ipratropium  3 mL Nebulization Q4H    enoxaparin  40 mg Subcutaneous Daily    hydroCHLOROthiazide  25 mg Oral Daily    predniSONE  40 mg Oral Daily    tiotropium  1 capsule Inhalation Daily     Continuous Infusions:  PRN Meds:.acetaminophen, albuterol, [COMPLETED] benzonatate **FOLLOWED BY** benzonatate, Dextrose 10% Bolus, Dextrose 10% Bolus, glucagon (human recombinant), glucose, glucose, insulin aspart U-100, ondansetron, pneumoc 13-maxine conj-dip cr(PF), promethazine (PHENERGAN) IVPB, sodium chloride 0.9%    Objective    Physical Examination    Temp:  [97.4 °F (36.3 °C)-99 °F (37.2 °C)]   Pulse:  []   Resp:  [14-22]   BP: (116-141)/(60-85)   SpO2:  [83 %-98 %]     Gen: NAD, conversant with full sentences  CV: RRR, no M/R/G, no peripheral edema  Resp: Distant breath sounds, but wheezing from yesterday resolved, no increased work of breathing on 2L via NC  GI: Soft, NT, ND, +BS  Neuro: AAOx3, CN grossly intact, no focal neurologic deficits    CBC  Recent Labs   Lab 02/04/20  1000 02/05/20  0348   WBC  8.63 10.85   HGB 15.4 13.8*   HCT 52.9 47.5    238     CMP  Recent Labs   Lab 02/04/20  1000 02/05/20  0348    141   K 4.2 3.8   CL 98 100   CO2 38* 31*   BUN 19 17   CREATININE 1.0 0.8   * 92   CALCIUM 9.3 8.2*   ALKPHOS 80  --    ALT 12  --    AST 11  --    ALBUMIN 3.9  --    PROT 7.5  --    BILITOT 0.3  --        Hospital Course:    Mr. Lutz is a 54-year-old man with tobacco abuse, HTN and COPD who presented to [facility] on 2/4 with shortness of breath and was admitted with acute hypoxic and hypercapnic respiratory failure secondary to COPD exacerbation. Though he doesn't carry a diagnosis of chronic respiratory failure, it is highly likely that this is a progression of pulmonary disease from historically poorly controlled COPD. On admission he was started on prednisone, 4L O2 via NC, scheduled bronchodilators, and tiotropium with improvement. The following day he remained without dyspnea, and his oxygen was decreased to 1-2L, though with any further attempts at weaning his SpO2 decreases to the low 80s. He remains asymptomatic, again suggestive of a chronic process. His goal SpO2 is > 88%. Breo was added 2/5, otherwise his standard acute COPD regimen was continued in an attempt to wean him off O2. He has no health insurance, which complicates any plan including home with O2, which he likely should have had even prior to this admission.    Assessment and Plan:    Acute respiratory failure with hypoxia and hypercapnia     - Improving  - Asymptomatic  - Suspect that he also has underlying chronic respiratory failure as evidence by his admission ABG with mostly hypercapnia largely compensated by metabolic alkalosis on admission BMP at 38  - No chest pain or lower extremity edema to suggest DVT/PE. Wells PA low risk at 1.5  (points for HR > 100 on arrival) which confers a 1.3% risk of PE in ED population. Negative D-dimer also reassuring  - No f/c/NS to suggest PNA, and CXR clear  - Biochemical and  "ECG investigation of cardiac contribution negative with BNP and troponin both undetectable and ECG showing NSR with no ischemic changes. Reportedly normal stress also reassuring  - ABG shows hypoxia and compensated hypercapnea at 7.325/73/71 on 4L via NC  - Needs PFTs as an outpatient to characterize further   - On 2/5 wheezing improved. Weaning O2, now down to 1-2L. SpO2 continuous ordered with goal > 88%     COPD with acute exacerbation     - Likely precipitated by viral URI. No myalgias or fever to suggest influenza  - Continue duonebs Q4H. Albuterol PRN for breakthrough  - Continue prednisone 40 mg daily with plans for a five-day course total  - Antibiotics not indicated  - Tessalon for cough  - Started tiotropium on admission, breo on 2/5  - Rest per "Acute respiratory failure with hypoxia and hypercapnia" above     Metabolic alkalosis     - Improving  - Likely compensatory from chronic hypercapnic respiratory failure 2/2 COPD  - BMP daily     Essential hypertension     - Improving, BP at goal  - Continue home HCTZ 12.5 mg daily  - 2g Na restriction     Tobacco abuse     - On admission, counseled at length (> 10 minutes) regarding the benefits of cessation and risks of ongoing use. He's strongly in pre-contemplation stage, stating that he would "rather die" than quit smoking, and is already requesting he leave to go smoke  - Counseled against continued smoking while on O2  - He refused pharmacologic options while inpatient     Diet: 2g Na restriction  VTE PPX: Enoxaparin    Goals of care: Curative, full code      Chinmay Tay M.D.  Department of Hospital Medicine  [facility]  [phone] (pager)  "

## 2020-02-05 NOTE — PLAN OF CARE
02/05/20 1020   Discharge Assessment   Assessment Type Discharge Planning Assessment   Confirmed/corrected address and phone number on facesheet? Yes   Assessment information obtained from? Patient   Expected Length of Stay (days) 2   Communicated expected length of stay with patient/caregiver yes   Prior to hospitilization cognitive status: Alert/Oriented   Prior to hospitalization functional status: Independent   Current cognitive status: Alert/Oriented   Current Functional Status: Independent   Lives With other (see comments)   Able to Return to Prior Arrangements yes   Is patient able to care for self after discharge? Yes   Readmission Within the Last 30 Days no previous admission in last 30 days   Patient currently being followed by outpatient case management? No   Patient currently receives any other outside agency services? No   Equipment Currently Used at Home none   Do you have any problems affording any of your prescribed medications? TBD   Does the patient have transportation home? Yes   Transportation Anticipated other (see comments)  (walk)   Discharge Plan A Other   DME Needed Upon Discharge  other (see comments)   Patient/Family in Agreement with Plan yes   Patient has been staying at [facility] and attending adult rehab program. He plans to return at d/c and states he will walk there. Patient states that he does not have medical insurance and does not have any medication.

## 2020-02-06 PROCEDURE — 25000242 PHARM REV CODE 250 ALT 637 W/ HCPCS: Performed by: INTERNAL MEDICINE

## 2020-02-06 PROCEDURE — 63600175 PHARM REV CODE 636 W HCPCS: Performed by: INTERNAL MEDICINE

## 2020-02-06 PROCEDURE — 94761 N-INVAS EAR/PLS OXIMETRY MLT: CPT

## 2020-02-06 PROCEDURE — 99231 SBSQ HOSP IP/OBS SF/LOW 25: CPT | Mod: ,,, | Performed by: HOSPITALIST

## 2020-02-06 PROCEDURE — 27000221 HC OXYGEN, UP TO 24 HOURS

## 2020-02-06 PROCEDURE — 25000003 PHARM REV CODE 250: Performed by: INTERNAL MEDICINE

## 2020-02-06 PROCEDURE — 11000001 HC ACUTE MED/SURG PRIVATE ROOM

## 2020-02-06 PROCEDURE — 94640 AIRWAY INHALATION TREATMENT: CPT

## 2020-02-06 PROCEDURE — 99231 PR SUBSEQUENT HOSPITAL CARE,LEVL I: ICD-10-PCS | Mod: ,,, | Performed by: HOSPITALIST

## 2020-02-06 RX ADMIN — HYDROCHLOROTHIAZIDE 25 MG: 25 TABLET ORAL at 08:02

## 2020-02-06 RX ADMIN — TIOTROPIUM BROMIDE 18 MCG: 18 CAPSULE ORAL; RESPIRATORY (INHALATION) at 08:02

## 2020-02-06 RX ADMIN — IPRATROPIUM BROMIDE AND ALBUTEROL SULFATE 3 ML: .5; 3 SOLUTION RESPIRATORY (INHALATION) at 12:02

## 2020-02-06 RX ADMIN — PREDNISONE 40 MG: 10 TABLET ORAL at 08:02

## 2020-02-06 RX ADMIN — IPRATROPIUM BROMIDE AND ALBUTEROL SULFATE 3 ML: .5; 3 SOLUTION RESPIRATORY (INHALATION) at 07:02

## 2020-02-06 RX ADMIN — ACETAMINOPHEN 650 MG: 325 TABLET ORAL at 08:02

## 2020-02-06 RX ADMIN — ENOXAPARIN SODIUM 40 MG: 100 INJECTION SUBCUTANEOUS at 04:02

## 2020-02-06 RX ADMIN — IPRATROPIUM BROMIDE AND ALBUTEROL SULFATE 3 ML: .5; 3 SOLUTION RESPIRATORY (INHALATION) at 04:02

## 2020-02-06 RX ADMIN — BENZONATATE 200 MG: 100 CAPSULE ORAL at 08:02

## 2020-02-06 RX ADMIN — FLUTICASONE FUROATE AND VILANTEROL TRIFENATATE 1 PUFF: 100; 25 POWDER RESPIRATORY (INHALATION) at 08:02

## 2020-02-06 RX ADMIN — IPRATROPIUM BROMIDE AND ALBUTEROL SULFATE 3 ML: .5; 3 SOLUTION RESPIRATORY (INHALATION) at 11:02

## 2020-02-06 RX ADMIN — IPRATROPIUM BROMIDE AND ALBUTEROL SULFATE 3 ML: .5; 3 SOLUTION RESPIRATORY (INHALATION) at 03:02

## 2020-02-06 NOTE — PROGRESS NOTES
[facility] Hospital Medicine  Progress Note    Patient Name: Kt Lutz  MRN: [id number]  Patient Class: IP- Inpatient   Admission Date: 2/4/2020  Length of Stay: 2 days  Attending Physician: Sandip Gutierrez MD  Primary Care Provider: [facility] Medicine Team: [facility] A Sandip Gutierrez MD    Subjective:     Principal Problem:Acute respiratory failure with hypoxia and hypercapnia    Interval History: seen today - oxygen is off, patient on continuous pulse ox monitor with consistent value of 83%, patient has no complaints, states he feels fine when off oxygen.     Medicaid paperwork started yesterday     Review of Systems   Respiratory: Negative for shortness of breath and wheezing.    Cardiovascular: Negative for chest pain and leg swelling.   Gastrointestinal: Negative for blood in stool and constipation.   Genitourinary: Negative for difficulty urinating and dysuria.   Musculoskeletal: Negative for arthralgias.     Objective:     Vital Signs (Most Recent):  Temp: 97.2 °F (36.2 °C) (02/06/20 1151)  Pulse: 74 (02/06/20 1600)  Resp: 19 (02/06/20 1600)  BP: 125/69 (02/06/20 1151)  SpO2: 95 % (02/06/20 1600) Vital Signs (24h Range):  Temp:  [97.2 °F (36.2 °C)-99.5 °F (37.5 °C)] 97.2 °F (36.2 °C)  Pulse:  [] 74  Resp:  [16-20] 19  SpO2:  [90 %-95 %] 95 %  BP: (117-155)/(58-75) 125/69   Intake/Outake:  This Shift:  No intake/output data recorded.    Net I/O past 24h:   No intake or output data in the 24 hours ending 02/06/20 1628      Weight: 106.3 kg (234 lb 5.6 oz)  Body mass index is 35.63 kg/m².  Physical Exam   Constitutional: No distress.   HENT:   Mouth/Throat: Oropharynx is clear and moist.   Cardiovascular: Normal rate, regular rhythm and normal heart sounds. Exam reveals no gallop and no friction rub.   No murmur heard.  Pulmonary/Chest: Effort normal.   Decreased breath sounds bilaterally   Abdominal: Soft. Bowel sounds are normal. He exhibits no distension  and no mass. There is no tenderness. There is no guarding.   Lymphadenopathy:     He has no cervical adenopathy.         Significant Labs: All pertinent labs within the past 24 hours have been reviewed.    Significant Imaging: I have reviewed all pertinent imaging results/findings within the past 24 hours.    MEDICATIONS  Scheduled Meds:   albuterol-ipratropium  3 mL Nebulization Q4H    enoxaparin  40 mg Subcutaneous Daily    fluticasone furoate-vilanterol  1 puff Inhalation Daily    hydroCHLOROthiazide  25 mg Oral Daily    predniSONE  40 mg Oral Daily    tiotropium  1 capsule Inhalation Daily                             Continuous Infusions:  PRN Meds:.acetaminophen, albuterol, [COMPLETED] benzonatate **FOLLOWED BY** benzonatate, Dextrose 10% Bolus, Dextrose 10% Bolus, glucagon (human recombinant), glucose, glucose, ondansetron, pneumoc 13-maxine conj-dip cr(PF), promethazine (PHENERGAN) IVPB, sodium chloride 0.9%    Assessment/Plan:     Overview/Hospital Course:   Mr. Lutz is a 54-year-old man with tobacco abuse, HTN and COPD who presented to [facility] on 2/4 with shortness of breath and was admitted with acute hypoxic and hypercapnic respiratory failure secondary to COPD exacerbation. Though he doesn't carry a diagnosis of chronic respiratory failure, it is highly likely that this is a progression of pulmonary disease from historically poorly controlled COPD. On admission he was started on prednisone, 4L O2 via NC, scheduled bronchodilators, and tiotropium with improvement. The following day he remained without dyspnea, and his oxygen was decreased to 1-2L, though with any further attempts at weaning his SpO2 decreases to the low 80s. He remains asymptomatic, again suggestive of a chronic process. His goal SpO2 is > 88%. Breo was added 2/5, otherwise his standard acute COPD regimen was continued in an attempt to wean him off O2. He has no health insurance, which complicates any plan including home with O2, which  "he likely should have had even prior to this admission.    Acute respiratory failure with hypoxia and hypercapnia     - Improving  - Asymptomatic  - Suspect that he also has underlying chronic respiratory failure as evidence by his admission ABG with mostly hypercapnia largely compensated by metabolic alkalosis on admission BMP at 38  - No chest pain or lower extremity edema to suggest DVT/PE. Wells PA low risk at 1.5  (points for HR > 100 on arrival) which confers a 1.3% risk of PE in ED population. Negative D-dimer also reassuring  - No f/c/NS to suggest PNA, and CXR clear  - Biochemical and ECG investigation of cardiac contribution negative with BNP and troponin both undetectable and ECG showing NSR with no ischemic changes. Reportedly normal stress also reassuring  - ABG shows hypoxia and compensated hypercapnea at 7.325/73/71 on 4L via NC  - Needs PFTs as an outpatient to characterize further   - On 2/5 wheezing improved. Weaning O2, now down to 1-2L. SpO2 continuous ordered with goal > 88%     COPD with acute exacerbation     - Likely precipitated by viral URI. No myalgias or fever to suggest influenza  - Continue duonebs Q4H. Albuterol PRN for breakthrough  - Continue prednisone 40 mg daily with plans for a five-day course total  - Antibiotics not indicated  - Tessalon for cough  - Started tiotropium on admission, breo on 2/5  - Rest per "Acute respiratory failure with hypoxia and hypercapnia" above     Metabolic alkalosis     - Improving  - Likely compensatory from chronic hypercapnic respiratory failure 2/2 COPD  - BMP daily     Essential hypertension     - Improving, BP at goal  - Continue home HCTZ 12.5 mg daily  - 2g Na restriction     Tobacco abuse     - On admission, counseled at length (> 10 minutes) regarding the benefits of cessation and risks of ongoing use. He's strongly in pre-contemplation stage, stating that he would "rather die" than quit smoking, and is already requesting he leave to go " smoke  - Counseled against continued smoking while on O2  - He refused pharmacologic options while inpatient     Diet: 2g Na restriction  VTE PPX: Enoxaparin    Goals of care: Curative, full code      Code Status: Full Code    Active Hospital Problems    Diagnosis  POA    *Acute respiratory failure with hypoxia and hypercapnia [J96.01, J96.02]  Yes    COPD with acute exacerbation [J44.1]  Yes    Essential hypertension [I10]  Yes    Tobacco abuse [Z72.0]  Yes      Resolved Hospital Problems   No resolved problems to display.     VTE Risk Mitigation (From admission, onward)         Ordered     enoxaparin injection 40 mg  Daily      02/04/20 1239     IP VTE HIGH RISK PATIENT  Once      02/04/20 1239                        Sandip Gutierrez M.D.  Attending Physician  [facility] Medicine Dept.  Pager: [phone]  [facility]  [id number]

## 2020-02-06 NOTE — PLAN OF CARE
02/06/20 0842   Post-Acute Status   Post-Acute Authorization Other   Other Status No Post-Acute Service Needs

## 2020-02-07 VITALS
OXYGEN SATURATION: 91 % | WEIGHT: 234.38 LBS | RESPIRATION RATE: 18 BRPM | HEIGHT: 68 IN | SYSTOLIC BLOOD PRESSURE: 133 MMHG | BODY MASS INDEX: 35.52 KG/M2 | HEART RATE: 98 BPM | TEMPERATURE: 99 F | DIASTOLIC BLOOD PRESSURE: 87 MMHG

## 2020-02-07 PROBLEM — J96.01 ACUTE RESPIRATORY FAILURE WITH HYPOXIA AND HYPERCAPNIA: Status: RESOLVED | Noted: 2020-02-04 | Resolved: 2020-02-07

## 2020-02-07 PROBLEM — J96.02 ACUTE RESPIRATORY FAILURE WITH HYPOXIA AND HYPERCAPNIA: Status: RESOLVED | Noted: 2020-02-04 | Resolved: 2020-02-07

## 2020-02-07 LAB
ANION GAP SERPL CALC-SCNC: 11 MMOL/L (ref 8–16)
BASOPHILS # BLD AUTO: 0.04 K/UL (ref 0–0.2)
BASOPHILS NFR BLD: 0.4 % (ref 0–1.9)
BUN SERPL-MCNC: 16 MG/DL (ref 6–20)
CALCIUM SERPL-MCNC: 8.6 MG/DL (ref 8.7–10.5)
CHLORIDE SERPL-SCNC: 97 MMOL/L (ref 95–110)
CO2 SERPL-SCNC: 32 MMOL/L (ref 23–29)
CREAT SERPL-MCNC: 0.8 MG/DL (ref 0.5–1.4)
DIFFERENTIAL METHOD: ABNORMAL
EOSINOPHIL # BLD AUTO: 0.1 K/UL (ref 0–0.5)
EOSINOPHIL NFR BLD: 0.8 % (ref 0–8)
ERYTHROCYTE [DISTWIDTH] IN BLOOD BY AUTOMATED COUNT: 13.7 % (ref 11.5–14.5)
EST. GFR  (AFRICAN AMERICAN): >60 ML/MIN/1.73 M^2
EST. GFR  (NON AFRICAN AMERICAN): >60 ML/MIN/1.73 M^2
GLUCOSE SERPL-MCNC: 91 MG/DL (ref 70–110)
HCT VFR BLD AUTO: 46.2 % (ref 40–54)
HGB BLD-MCNC: 13.6 G/DL (ref 14–18)
IMM GRANULOCYTES # BLD AUTO: 0.04 K/UL (ref 0–0.04)
IMM GRANULOCYTES NFR BLD AUTO: 0.4 % (ref 0–0.5)
LYMPHOCYTES # BLD AUTO: 2.2 K/UL (ref 1–4.8)
LYMPHOCYTES NFR BLD: 21.7 % (ref 18–48)
MCH RBC QN AUTO: 28.7 PG (ref 27–31)
MCHC RBC AUTO-ENTMCNC: 29.4 G/DL (ref 32–36)
MCV RBC AUTO: 98 FL (ref 82–98)
MONOCYTES # BLD AUTO: 1 K/UL (ref 0.3–1)
MONOCYTES NFR BLD: 10.4 % (ref 4–15)
NEUTROPHILS # BLD AUTO: 6.7 K/UL (ref 1.8–7.7)
NEUTROPHILS NFR BLD: 66.3 % (ref 38–73)
NRBC BLD-RTO: 0 /100 WBC
PLATELET # BLD AUTO: 240 K/UL (ref 150–350)
PMV BLD AUTO: 10.2 FL (ref 9.2–12.9)
POTASSIUM SERPL-SCNC: 3.6 MMOL/L (ref 3.5–5.1)
RBC # BLD AUTO: 4.74 M/UL (ref 4.6–6.2)
SODIUM SERPL-SCNC: 140 MMOL/L (ref 136–145)
WBC # BLD AUTO: 10.02 K/UL (ref 3.9–12.7)

## 2020-02-07 PROCEDURE — 90472 IMMUNIZATION ADMIN EACH ADD: CPT | Performed by: HOSPITALIST

## 2020-02-07 PROCEDURE — 90471 IMMUNIZATION ADMIN: CPT | Performed by: HOSPITALIST

## 2020-02-07 PROCEDURE — 25000003 PHARM REV CODE 250: Performed by: INTERNAL MEDICINE

## 2020-02-07 PROCEDURE — 90670 PCV13 VACCINE IM: CPT | Performed by: INTERNAL MEDICINE

## 2020-02-07 PROCEDURE — 94761 N-INVAS EAR/PLS OXIMETRY MLT: CPT

## 2020-02-07 PROCEDURE — 63600175 PHARM REV CODE 636 W HCPCS: Performed by: HOSPITALIST

## 2020-02-07 PROCEDURE — 25000242 PHARM REV CODE 250 ALT 637 W/ HCPCS: Performed by: INTERNAL MEDICINE

## 2020-02-07 PROCEDURE — 90686 IIV4 VACC NO PRSV 0.5 ML IM: CPT | Performed by: HOSPITALIST

## 2020-02-07 PROCEDURE — 25000242 PHARM REV CODE 250 ALT 637 W/ HCPCS: Performed by: HOSPITALIST

## 2020-02-07 PROCEDURE — 94640 AIRWAY INHALATION TREATMENT: CPT

## 2020-02-07 PROCEDURE — 63600175 PHARM REV CODE 636 W HCPCS: Performed by: INTERNAL MEDICINE

## 2020-02-07 PROCEDURE — 27000221 HC OXYGEN, UP TO 24 HOURS

## 2020-02-07 PROCEDURE — 99239 HOSP IP/OBS DSCHRG MGMT >30: CPT | Mod: ,,, | Performed by: HOSPITALIST

## 2020-02-07 PROCEDURE — 80048 BASIC METABOLIC PNL TOTAL CA: CPT

## 2020-02-07 PROCEDURE — 85025 COMPLETE CBC W/AUTO DIFF WBC: CPT

## 2020-02-07 PROCEDURE — 99239 PR HOSPITAL DISCHARGE DAY,>30 MIN: ICD-10-PCS | Mod: ,,, | Performed by: HOSPITALIST

## 2020-02-07 PROCEDURE — 90471 IMMUNIZATION ADMIN: CPT | Performed by: INTERNAL MEDICINE

## 2020-02-07 PROCEDURE — 36415 COLL VENOUS BLD VENIPUNCTURE: CPT

## 2020-02-07 RX ORDER — IPRATROPIUM BROMIDE AND ALBUTEROL SULFATE 2.5; .5 MG/3ML; MG/3ML
3 SOLUTION RESPIRATORY (INHALATION)
Status: DISCONTINUED | OUTPATIENT
Start: 2020-02-07 | End: 2020-02-07 | Stop reason: HOSPADM

## 2020-02-07 RX ORDER — ALBUTEROL SULFATE 90 UG/1
2 AEROSOL, METERED RESPIRATORY (INHALATION) EVERY 6 HOURS PRN
Qty: 6.7 G | Refills: 1 | Status: ON HOLD | OUTPATIENT
Start: 2020-02-07 | End: 2020-03-23 | Stop reason: SDUPTHER

## 2020-02-07 RX ORDER — TIOTROPIUM BROMIDE 18 UG/1
18 CAPSULE ORAL; RESPIRATORY (INHALATION) DAILY
Qty: 30 CAPSULE | Refills: 1 | Status: ON HOLD | OUTPATIENT
Start: 2020-02-07 | End: 2020-03-23 | Stop reason: SDUPTHER

## 2020-02-07 RX ORDER — LISINOPRIL 20 MG/1
20 TABLET ORAL DAILY
Qty: 30 TABLET | Refills: 1 | Status: ON HOLD | OUTPATIENT
Start: 2020-02-07 | End: 2020-03-23 | Stop reason: SDUPTHER

## 2020-02-07 RX ORDER — PREDNISONE 20 MG/1
40 TABLET ORAL DAILY
Qty: 2 TABLET | Refills: 0 | Status: SHIPPED | OUTPATIENT
Start: 2020-02-08 | End: 2020-02-09

## 2020-02-07 RX ORDER — BUDESONIDE AND FORMOTEROL FUMARATE DIHYDRATE 80; 4.5 UG/1; UG/1
2 AEROSOL RESPIRATORY (INHALATION) 2 TIMES DAILY
Qty: 10.2 G | Refills: 1 | Status: ON HOLD | OUTPATIENT
Start: 2020-02-07 | End: 2020-03-23 | Stop reason: SDUPTHER

## 2020-02-07 RX ADMIN — INFLUENZA VIRUS VACCINE 0.5 ML: 15; 15; 15; 15 SUSPENSION INTRAMUSCULAR at 02:02

## 2020-02-07 RX ADMIN — FLUTICASONE FUROATE AND VILANTEROL TRIFENATATE 1 PUFF: 100; 25 POWDER RESPIRATORY (INHALATION) at 09:02

## 2020-02-07 RX ADMIN — BENZONATATE 200 MG: 100 CAPSULE ORAL at 10:02

## 2020-02-07 RX ADMIN — TIOTROPIUM BROMIDE 18 MCG: 18 CAPSULE ORAL; RESPIRATORY (INHALATION) at 09:02

## 2020-02-07 RX ADMIN — IPRATROPIUM BROMIDE AND ALBUTEROL SULFATE 3 ML: .5; 3 SOLUTION RESPIRATORY (INHALATION) at 02:02

## 2020-02-07 RX ADMIN — IPRATROPIUM BROMIDE AND ALBUTEROL SULFATE 3 ML: .5; 3 SOLUTION RESPIRATORY (INHALATION) at 08:02

## 2020-02-07 RX ADMIN — PREDNISONE 40 MG: 10 TABLET ORAL at 09:02

## 2020-02-07 RX ADMIN — ALBUTEROL SULFATE 2 PUFF: 90 AEROSOL, METERED RESPIRATORY (INHALATION) at 10:02

## 2020-02-07 RX ADMIN — HYDROCHLOROTHIAZIDE 25 MG: 25 TABLET ORAL at 09:02

## 2020-02-07 RX ADMIN — IPRATROPIUM BROMIDE AND ALBUTEROL SULFATE 3 ML: .5; 3 SOLUTION RESPIRATORY (INHALATION) at 03:02

## 2020-02-07 RX ADMIN — PNEUMOCOCCAL 13-VALENT CONJUGATE VACCINE 0.5 ML: 2.2; 2.2; 2.2; 2.2; 2.2; 4.4; 2.2; 2.2; 2.2; 2.2; 2.2; 2.2; 2.2 INJECTION, SUSPENSION INTRAMUSCULAR at 02:02

## 2020-02-07 NOTE — RESPIRATORY THERAPY
Pt found to have removed NC, hence low SaO2. RT reinforced need for O2 after showing SaO2 on pulse OX. Will continue to follow.

## 2020-02-07 NOTE — PLAN OF CARE
Requested bedside delivery of medications. Cost transfer sent to Pharmacy. Informed patient and nurse that he is ready for d/c when meds are delivered. Offered transportation and he states that he prefers to walk to [facility].

## 2020-02-07 NOTE — NURSING
Patient Discharged. Discharge instructions provided, verbalized understanding. IV removed, catheter tip intact. Patient waiting on medications at bedside. Will continue to monitor patient.

## 2020-02-07 NOTE — PLAN OF CARE
02/07/20 0838   Post-Acute Status   Post-Acute Authorization Other   Other Status No Post-Acute Service Needs

## 2020-02-07 NOTE — PLAN OF CARE
Patient condition stable. No acute distress noted. Patient educated on Tobacco cessation. Patient educated on calling for assistance as needed.

## 2020-02-07 NOTE — DISCHARGE SUMMARY
[facility] Hospital Medicine  Discharge Summary      Patient Name: Kt Lutz  MRN: [id number]  Admission Date: 2/4/2020  Hospital Length of Stay: 3 days  Discharge Date and Time: No discharge date for patient encounter.  Attending Physician: Sandip Gutierrez MD   Discharging Provider: Sandip Gutierrez MD  Primary Care Provider: Primary Doctor [facility] Medicine Team: [facility] HOSP MED A Sandip Gutierrez MD    HPI: Mr. Albright is a 54-year-old man with tobacco abuse, HTN and COPD who presents with shortness of breath. This is his third presentation for similar complaints in the last year, with a most recent admission being around the time of Thanksgiving, at which time he reports presenting with chest pain and SOB and was admitted to [place] for ACS rule-out culminating in a negative stress test and diagnosis of COPD. He has an extended history of tobacco abuse starting when he was 16 and continuing to today. He currently smokes about 1/2 PPD, though throughout his 30s and 40s smoked upwards of 2-3, essentially continuously. He has been in his usual state of health until one month ago when he developed a nagging dry cough, which worsened over the last week after he ran out of the albuterol prescribed from his recent hospitalization. No sputum changes. He borrowed some spiriva from his sister with no effect. No chest pain, palpitations, syncope, or edema. He does not feel dyspneic No fever, chills, or NS. He lives at the [facility] with numerous sick contacts.     On arrival here he was hypoxic to to 78 on RA, improving to high 90s on 4L O2. His lab workup was largely normal, including D-Dimer at 0.30, with the exception of CO2 elevation at 38 and ABG shows hypoxic and hypercapnic respiratory failure at 7.325/73/71 on 4L via NC. CXR clear. He was given duoneb x3, prednisone, and tessalon. Despite the hypoxia he has remained asymptomatic. His only complaint currently is a desire to leave the  ED to smoke. Admission requested for COPD exacerbation.    * No surgery found *      Hospital Course:   Mr. Lutz is a 54-year-old man with tobacco abuse, HTN and COPD who presented to [facility] on 2/4 with shortness of breath and was admitted with acute hypoxic and hypercapnic respiratory failure secondary to COPD exacerbation. Though he doesn't carry a diagnosis of chronic respiratory failure, it is highly likely that this is a progression of pulmonary disease from historically poorly controlled COPD. On admission he was started on prednisone, 4L O2 via NC, scheduled bronchodilators, and tiotropium with improvement. The following day he remained without dyspnea, and his oxygen was decreased to 1-2L, though with any further attempts at weaning his SpO2 decreases to the low 80s. He remains asymptomatic, again suggestive of a chronic process. His goal SpO2 is > 88%. Breo was added 2/5, otherwise his standard acute COPD regimen was continued in an attempt to wean him off O2. He has no health insurance, which complicates any plan including home with O2.     On 2/6 patient weaned to 1-2L of supplemental oxygen removed from continuous pulse oximetry, patient reporting he feels back to baseline and normally has high exercise tolerance walking long distances.  In further discussion patient with prior hx of drug and alcohol abuse and is currently in remission related to substance abuse issues, reports more recently being notified by medicall staff/doctors regarding diagnosis of COPD likely related to chronic smoking history.  He states he has cut back to 10 cigarettes a day and while inpatient has not had strong cravings, I have advised the patient that he should cease all tobacco use, at this time is not interested in replacement therapy with lozenges or patches.      On day of discharge having morning resting o2 saturations >88%. Patient is in 2 months of planned 6 month rehab stay/sober living at the [facility].  Pt is  uninsured, medicaid pending.  Discharge medications ordered and cost transfer completed by case management to have pt receive medications for discharge.  Recommended pt establish  With [facility] for primary care and likely need for pulmonology referral.       Discharging patient on 1 more day of prednisone.   He requested that HCTZ be stopped as an anti-hypertensive, so will continue Lisinopril 20mg for pt.  Spiriva and Symbicort ordered as well as PRN ALbuterol inhaler.         Physical Exam   Constitutional: No distress.   HENT:   Mouth/Throat: Oropharynx is clear and moist.   Cardiovascular: Normal rate, regular rhythm and normal heart sounds. Exam reveals no gallop and no friction rub.   No murmur heard.  Pulmonary/Chest: Effort normal.   Decreased breath sounds bilaterally   Abdominal: Soft. Bowel sounds are normal. He exhibits no distension and no mass. There is no tenderness. There is no guarding.   Lymphadenopathy:     He has no cervical adenopathy.     Consults:     Final Active Diagnoses:    Diagnosis Date Noted POA    COPD with acute exacerbation [J44.1] 02/04/2020 Yes    Essential hypertension [I10] 02/04/2020 Yes    Tobacco abuse [Z72.0] 02/04/2020 Yes      Problems Resolved During this Admission:    Diagnosis Date Noted Date Resolved POA    PRINCIPAL PROBLEM:  Acute respiratory failure with hypoxia and hypercapnia [J96.01, J96.02] 02/04/2020 02/07/2020 Yes      Discharged Condition: stable    Disposition: Home or Self Care    Follow Up:  Follow-up Information     Go to [facility]    Specialty:  Emergency Medicine  Why:  If symptoms worsen  Contact information:  [address] [address] [phone]           [facility][1]. Schedule an appointment as soon as possible for a visit in 1 week.    Why:  For hospital follow-up  Contact information:  [address] [facility] [address] [facility] [address]               Patient Instructions:       Diet Adult Regular     Notify your health care provider if you experience any of the following:  temperature >100.4     Notify your health care provider if you experience any of the following:  persistent nausea and vomiting or diarrhea     Notify your health care provider if you experience any of the following:  severe uncontrolled pain     Notify your health care provider if you experience any of the following:  difficulty breathing or increased cough     Notify your health care provider if you experience any of the following:  severe persistent headache     Notify your health care provider if you experience any of the following:  persistent dizziness, light-headedness, or visual disturbances     Notify your health care provider if you experience any of the following:  increased confusion or weakness     Activity as tolerated     Medications:  Reconciled Home Medications:      Medication List      START taking these medications    albuterol 90 mcg/actuation inhaler  Commonly known as:  PROVENTIL/VENTOLIN HFA  Inhale 2 puffs into the lungs every 6 (six) hours as needed for Wheezing or Shortness of Breath. Rescue  Replaces:  albuterol 0.63 mg/3 mL Nebu     budesonide-formoterol 80-4.5 mcg 80-4.5 mcg/actuation Hfaa  Commonly known as:  SYMBICORT  Inhale 2 puffs into the lungs 2 (two) times daily. Controller     predniSONE 20 MG tablet  Commonly known as:  DELTASONE  Take 2 tablets (40 mg total) by mouth once daily. for 1 dose  Start taking on:  February 8, 2020     tiotropium 18 mcg inhalation capsule  Commonly known as:  SPIRIVA  Inhale 1 capsule (18 mcg total) into the lungs once daily. Controller        CONTINUE taking these medications    lisinopril 20 MG tablet  Commonly known as:  PRINIVIL,ZESTRIL  Take 1 tablet (20 mg total) by mouth once daily.        STOP taking these medications    albuterol 0.63 mg/3 mL Nebu  Commonly known as:  ACCUNEB  Replaced by:  albuterol 90 mcg/actuation inhaler      hydroCHLOROthiazide 25 MG tablet  Commonly known as:  HYDRODIURIL            Significant Diagnostic Studies: Labs:   CMP   Recent Labs   Lab 02/07/20  0417      K 3.6   CL 97   CO2 32*   GLU 91   BUN 16   CREATININE 0.8   CALCIUM 8.6*   ANIONGAP 11   ESTGFRAFRICA >60.0   EGFRNONAA >60.0   , CBC   Recent Labs   Lab 02/07/20  0417   WBC 10.02   HGB 13.6*   HCT 46.2      , INR No results found for: INR, PROTIME, Lipid Panel No results found for: CHOL, HDL, LDLCALC, TRIG, CHOLHDL, Troponin   Recent Labs   Lab 02/04/20  1000   TROPONINI <0.006    and A1C: No results for input(s): HGBA1C in the last 4320 hours.     Results for ANA WAGNER (MRN [id number]) as of 2/7/2020 14:45   Ref. Range 2/4/2020 10:06   POC PH Latest Ref Range: 7.35 - 7.45  7.325 (L)   POC PCO2 Latest Ref Range: 35 - 45 mmHg 73.2 (HH)   POC PO2 Latest Ref Range: 80 - 100 mmHg 71 (L)   POC BE Latest Ref Range: -2 to 2 mmol/L 12   POC HCO3 Latest Ref Range: 24 - 28 mmol/L 38.2 (H)   POC SATURATED O2 Latest Ref Range: 95 - 100 % 92 (L)   POC TCO2 Latest Ref Range: 23 - 27 mmol/L 40 (H)   FiO2 Unknown 94   Flow Unknown 4   Sample Unknown ARTERIAL   DelSys Unknown Nasal Can   Allens Test Unknown Pass   Site Unknown LR   Mode Unknown SPONT        XR CHEST PA AND LATERAL    CLINICAL HISTORY:  Cough    TECHNIQUE:  PA and lateral views of the chest were performed.    COMPARISON:  None    FINDINGS:  Mediastinal structures are midline. Cardiac silhouette and pulmonary vascular distribution are normal.    There is mild elevation of the right hemidiaphragm possibly due to eventration.  Allowing for this, pulmonary volumes appear unremarkable.    Taking both views into account I detect no convincing evidence of pneumonia or other pulmonary disease in this patient with a history of cough.    I detect no pleural fluid, lymph node enlargement, cardiac decompensation, pneumothorax, pneumomediastinum, pneumoperitoneum or significant osseous abnormality.   Degenerative changes are present at the acromioclavicular joints.      Impression       I detect no convincing evidence of disease allowing for mild elevation of the right hemidiaphragm.      Electronically signed by: Munira Casas MD  Date: 02/04/2020         Pending Diagnostic Studies:     None        Indwelling Lines/Drains at time of discharge:   Lines/Drains/Airways     None                 Time spent on the discharge of patient: 39 minutes  Patient was seen and examined on the date of discharge and determined to be suitable for discharge.         Sandip Gutierrez MD  Department of Hospital Medicine  [facility]

## 2020-03-12 ENCOUNTER — HOSPITAL ENCOUNTER (INPATIENT)
Facility: HOSPITAL | Age: 55
LOS: 12 days | Discharge: HOME | DRG: 190 | End: 2020-03-24
Attending: EMERGENCY MEDICINE | Admitting: EMERGENCY MEDICINE
Payer: MEDICAID

## 2020-03-12 DIAGNOSIS — J96.01 ACUTE RESPIRATORY FAILURE WITH HYPOXIA AND HYPERCAPNIA: ICD-10-CM

## 2020-03-12 DIAGNOSIS — I10 ESSENTIAL HYPERTENSION: ICD-10-CM

## 2020-03-12 DIAGNOSIS — J44.1 COPD EXACERBATION: ICD-10-CM

## 2020-03-12 DIAGNOSIS — R07.9 CHEST PAIN: ICD-10-CM

## 2020-03-12 DIAGNOSIS — R09.02 HYPOXIA: ICD-10-CM

## 2020-03-12 DIAGNOSIS — Z72.0 TOBACCO ABUSE: ICD-10-CM

## 2020-03-12 DIAGNOSIS — R06.02 SHORTNESS OF BREATH: ICD-10-CM

## 2020-03-12 DIAGNOSIS — J96.01 ACUTE RESPIRATORY FAILURE WITH HYPOXIA: ICD-10-CM

## 2020-03-12 DIAGNOSIS — Z20.822 SUSPECTED COVID-19 VIRUS INFECTION: Primary | ICD-10-CM

## 2020-03-12 DIAGNOSIS — J44.1 COPD WITH ACUTE EXACERBATION: ICD-10-CM

## 2020-03-12 DIAGNOSIS — J96.02 ACUTE RESPIRATORY FAILURE WITH HYPOXIA AND HYPERCAPNIA: ICD-10-CM

## 2020-03-12 DIAGNOSIS — J18.9 HEALTHCARE-ASSOCIATED PNEUMONIA: ICD-10-CM

## 2020-03-12 LAB
ADENOVIRUS: NOT DETECTED
ALBUMIN SERPL BCP-MCNC: 3.1 G/DL (ref 3.5–5.2)
ALLENS TEST: ABNORMAL
ALP SERPL-CCNC: 73 U/L (ref 55–135)
ALT SERPL W/O P-5'-P-CCNC: 29 U/L (ref 10–44)
ANION GAP SERPL CALC-SCNC: 8 MMOL/L (ref 8–16)
AST SERPL-CCNC: 21 U/L (ref 10–40)
BASOPHILS # BLD AUTO: 0.05 K/UL (ref 0–0.2)
BASOPHILS NFR BLD: 0.5 % (ref 0–1.9)
BILIRUB SERPL-MCNC: 0.3 MG/DL (ref 0.1–1)
BNP SERPL-MCNC: 28 PG/ML (ref 0–99)
BORDETELLA PARAPERTUSSIS (IS1001): NOT DETECTED
BORDETELLA PERTUSSIS (PTXP): NOT DETECTED
BUN SERPL-MCNC: 12 MG/DL (ref 6–20)
CALCIUM SERPL-MCNC: 8.3 MG/DL (ref 8.7–10.5)
CHLAMYDIA PNEUMONIAE: NOT DETECTED
CHLORIDE SERPL-SCNC: 100 MMOL/L (ref 95–110)
CO2 SERPL-SCNC: 35 MMOL/L (ref 23–29)
CORONAVIRUS 229E, COMMON COLD VIRUS: NOT DETECTED
CORONAVIRUS HKU1, COMMON COLD VIRUS: NOT DETECTED
CORONAVIRUS NL63, COMMON COLD VIRUS: NOT DETECTED
CORONAVIRUS OC43, COMMON COLD VIRUS: NOT DETECTED
CREAT SERPL-MCNC: 0.8 MG/DL (ref 0.5–1.4)
DELSYS: ABNORMAL
DIFFERENTIAL METHOD: ABNORMAL
EOSINOPHIL # BLD AUTO: 0.3 K/UL (ref 0–0.5)
EOSINOPHIL NFR BLD: 2.8 % (ref 0–8)
ERYTHROCYTE [DISTWIDTH] IN BLOOD BY AUTOMATED COUNT: 14.3 % (ref 11.5–14.5)
EST. GFR  (AFRICAN AMERICAN): >60 ML/MIN/1.73 M^2
EST. GFR  (NON AFRICAN AMERICAN): >60 ML/MIN/1.73 M^2
ESTIMATED AVG GLUCOSE: 126 MG/DL (ref 68–131)
FLOW: 2
FLUBV RNA NPH QL NAA+NON-PROBE: NOT DETECTED
GLUCOSE SERPL-MCNC: 115 MG/DL (ref 70–110)
HBA1C MFR BLD HPLC: 6 % (ref 4–5.6)
HCO3 UR-SCNC: 40.4 MMOL/L (ref 24–28)
HCT VFR BLD AUTO: 48.6 % (ref 40–54)
HGB BLD-MCNC: 14.2 G/DL (ref 14–18)
HPIV1 RNA NPH QL NAA+NON-PROBE: NOT DETECTED
HPIV2 RNA NPH QL NAA+NON-PROBE: NOT DETECTED
HPIV3 RNA NPH QL NAA+NON-PROBE: NOT DETECTED
HPIV4 RNA NPH QL NAA+NON-PROBE: NOT DETECTED
HUMAN METAPNEUMOVIRUS: NOT DETECTED
IMM GRANULOCYTES # BLD AUTO: 0.06 K/UL (ref 0–0.04)
IMM GRANULOCYTES NFR BLD AUTO: 0.7 % (ref 0–0.5)
INFLUENZA A (SUBTYPES H1,H1-2009,H3): NOT DETECTED
INFLUENZA A, MOLECULAR: NEGATIVE
INFLUENZA B, MOLECULAR: NEGATIVE
INR PPP: 1 (ref 0.8–1.2)
LYMPHOCYTES # BLD AUTO: 1.1 K/UL (ref 1–4.8)
LYMPHOCYTES NFR BLD: 12.5 % (ref 18–48)
MCH RBC QN AUTO: 28.4 PG (ref 27–31)
MCHC RBC AUTO-ENTMCNC: 29.2 G/DL (ref 32–36)
MCV RBC AUTO: 97 FL (ref 82–98)
MODE: ABNORMAL
MONOCYTES # BLD AUTO: 0.8 K/UL (ref 0.3–1)
MONOCYTES NFR BLD: 8.2 % (ref 4–15)
MYCOPLASMA PNEUMONIAE: NOT DETECTED
NEUTROPHILS # BLD AUTO: 6.9 K/UL (ref 1.8–7.7)
NEUTROPHILS NFR BLD: 75.3 % (ref 38–73)
NRBC BLD-RTO: 0 /100 WBC
PCO2 BLDA: 76.8 MMHG (ref 35–45)
PH SMN: 7.33 [PH] (ref 7.35–7.45)
PLATELET # BLD AUTO: 312 K/UL (ref 150–350)
PMV BLD AUTO: 9.4 FL (ref 9.2–12.9)
PO2 BLDA: 49 MMHG (ref 40–60)
POC BE: 14 MMOL/L
POC SATURATED O2: 79 % (ref 95–100)
POC TCO2: 43 MMOL/L (ref 24–29)
POTASSIUM SERPL-SCNC: 4.3 MMOL/L (ref 3.5–5.1)
PROT SERPL-MCNC: 6.8 G/DL (ref 6–8.4)
PROTHROMBIN TIME: 10 SEC (ref 9–12.5)
RBC # BLD AUTO: 5 M/UL (ref 4.6–6.2)
RESPIRATORY INFECTION PANEL SOURCE: NORMAL
RSV RNA NPH QL NAA+NON-PROBE: NOT DETECTED
RV+EV RNA NPH QL NAA+NON-PROBE: NOT DETECTED
SAMPLE: ABNORMAL
SITE: ABNORMAL
SODIUM SERPL-SCNC: 143 MMOL/L (ref 136–145)
SPECIMEN SOURCE: NORMAL
TROPONIN I SERPL DL<=0.01 NG/ML-MCNC: 0.01 NG/ML (ref 0–0.03)
TSH SERPL DL<=0.005 MIU/L-ACNC: 0.53 UIU/ML (ref 0.4–4)
WBC # BLD AUTO: 9.15 K/UL (ref 3.9–12.7)

## 2020-03-12 PROCEDURE — 27000190 HC CPAP FULL FACE MASK W/VALVE

## 2020-03-12 PROCEDURE — 25000242 PHARM REV CODE 250 ALT 637 W/ HCPCS: Performed by: EMERGENCY MEDICINE

## 2020-03-12 PROCEDURE — 84443 ASSAY THYROID STIM HORMONE: CPT

## 2020-03-12 PROCEDURE — 84484 ASSAY OF TROPONIN QUANT: CPT

## 2020-03-12 PROCEDURE — 99291 CRITICAL CARE FIRST HOUR: CPT | Mod: 25

## 2020-03-12 PROCEDURE — 96375 TX/PRO/DX INJ NEW DRUG ADDON: CPT

## 2020-03-12 PROCEDURE — 96365 THER/PROPH/DIAG IV INF INIT: CPT

## 2020-03-12 PROCEDURE — 99900035 HC TECH TIME PER 15 MIN (STAT)

## 2020-03-12 PROCEDURE — 63600175 PHARM REV CODE 636 W HCPCS: Performed by: EMERGENCY MEDICINE

## 2020-03-12 PROCEDURE — 99223 PR INITIAL HOSPITAL CARE,LEVL III: ICD-10-PCS | Mod: ,,, | Performed by: HOSPITALIST

## 2020-03-12 PROCEDURE — 99291 PR CRITICAL CARE, E/M 30-74 MINUTES: ICD-10-PCS | Mod: ,,, | Performed by: EMERGENCY MEDICINE

## 2020-03-12 PROCEDURE — 87502 INFLUENZA DNA AMP PROBE: CPT

## 2020-03-12 PROCEDURE — 87633 RESP VIRUS 12-25 TARGETS: CPT

## 2020-03-12 PROCEDURE — 99291 CRITICAL CARE FIRST HOUR: CPT | Mod: ,,, | Performed by: EMERGENCY MEDICINE

## 2020-03-12 PROCEDURE — 87205 SMEAR GRAM STAIN: CPT

## 2020-03-12 PROCEDURE — 11000001 HC ACUTE MED/SURG PRIVATE ROOM

## 2020-03-12 PROCEDURE — 99223 1ST HOSP IP/OBS HIGH 75: CPT | Mod: ,,, | Performed by: HOSPITALIST

## 2020-03-12 PROCEDURE — 94640 AIRWAY INHALATION TREATMENT: CPT

## 2020-03-12 PROCEDURE — 83880 ASSAY OF NATRIURETIC PEPTIDE: CPT

## 2020-03-12 PROCEDURE — 83036 HEMOGLOBIN GLYCOSYLATED A1C: CPT

## 2020-03-12 PROCEDURE — 87040 BLOOD CULTURE FOR BACTERIA: CPT

## 2020-03-12 PROCEDURE — 82803 BLOOD GASES ANY COMBINATION: CPT

## 2020-03-12 PROCEDURE — 93010 EKG 12-LEAD: ICD-10-PCS | Mod: ,,, | Performed by: PEDIATRICS

## 2020-03-12 PROCEDURE — 85025 COMPLETE CBC W/AUTO DIFF WBC: CPT

## 2020-03-12 PROCEDURE — 25000003 PHARM REV CODE 250: Performed by: STUDENT IN AN ORGANIZED HEALTH CARE EDUCATION/TRAINING PROGRAM

## 2020-03-12 PROCEDURE — 25000242 PHARM REV CODE 250 ALT 637 W/ HCPCS: Performed by: STUDENT IN AN ORGANIZED HEALTH CARE EDUCATION/TRAINING PROGRAM

## 2020-03-12 PROCEDURE — 93005 ELECTROCARDIOGRAM TRACING: CPT

## 2020-03-12 PROCEDURE — 87070 CULTURE OTHR SPECIMN AEROBIC: CPT

## 2020-03-12 PROCEDURE — 27000221 HC OXYGEN, UP TO 24 HOURS

## 2020-03-12 PROCEDURE — 93010 ELECTROCARDIOGRAM REPORT: CPT | Mod: ,,, | Performed by: PEDIATRICS

## 2020-03-12 PROCEDURE — 94761 N-INVAS EAR/PLS OXIMETRY MLT: CPT

## 2020-03-12 PROCEDURE — 94660 CPAP INITIATION&MGMT: CPT

## 2020-03-12 PROCEDURE — 80053 COMPREHEN METABOLIC PANEL: CPT

## 2020-03-12 PROCEDURE — 85610 PROTHROMBIN TIME: CPT

## 2020-03-12 RX ORDER — SODIUM CHLORIDE 0.9 % (FLUSH) 0.9 %
3 SYRINGE (ML) INJECTION
Status: DISCONTINUED | OUTPATIENT
Start: 2020-03-12 | End: 2020-03-24 | Stop reason: HOSPADM

## 2020-03-12 RX ORDER — IBUPROFEN 200 MG
16 TABLET ORAL
Status: DISCONTINUED | OUTPATIENT
Start: 2020-03-12 | End: 2020-03-24 | Stop reason: HOSPADM

## 2020-03-12 RX ORDER — MAGNESIUM SULFATE HEPTAHYDRATE 40 MG/ML
2 INJECTION, SOLUTION INTRAVENOUS
Status: COMPLETED | OUTPATIENT
Start: 2020-03-12 | End: 2020-03-12

## 2020-03-12 RX ORDER — TIOTROPIUM BROMIDE 18 UG/1
18 CAPSULE ORAL; RESPIRATORY (INHALATION) DAILY
Status: DISCONTINUED | OUTPATIENT
Start: 2020-03-12 | End: 2020-03-16

## 2020-03-12 RX ORDER — ALBUTEROL SULFATE 90 UG/1
2 AEROSOL, METERED RESPIRATORY (INHALATION) EVERY 6 HOURS PRN
Status: DISCONTINUED | OUTPATIENT
Start: 2020-03-12 | End: 2020-03-16

## 2020-03-12 RX ORDER — GLUCAGON 1 MG
1 KIT INJECTION
Status: DISCONTINUED | OUTPATIENT
Start: 2020-03-12 | End: 2020-03-24 | Stop reason: HOSPADM

## 2020-03-12 RX ORDER — IBUPROFEN 200 MG
24 TABLET ORAL
Status: DISCONTINUED | OUTPATIENT
Start: 2020-03-12 | End: 2020-03-24 | Stop reason: HOSPADM

## 2020-03-12 RX ORDER — IPRATROPIUM BROMIDE AND ALBUTEROL SULFATE 2.5; .5 MG/3ML; MG/3ML
3 SOLUTION RESPIRATORY (INHALATION)
Status: COMPLETED | OUTPATIENT
Start: 2020-03-12 | End: 2020-03-12

## 2020-03-12 RX ORDER — IBUPROFEN 200 MG
1 TABLET ORAL DAILY PRN
Status: DISCONTINUED | OUTPATIENT
Start: 2020-03-12 | End: 2020-03-14

## 2020-03-12 RX ORDER — IPRATROPIUM BROMIDE AND ALBUTEROL SULFATE 2.5; .5 MG/3ML; MG/3ML
3 SOLUTION RESPIRATORY (INHALATION) EVERY 4 HOURS
Status: DISCONTINUED | OUTPATIENT
Start: 2020-03-12 | End: 2020-03-15

## 2020-03-12 RX ORDER — PREDNISONE 20 MG/1
40 TABLET ORAL DAILY
Status: COMPLETED | OUTPATIENT
Start: 2020-03-13 | End: 2020-03-16

## 2020-03-12 RX ORDER — METHYLPREDNISOLONE SOD SUCC 125 MG
125 VIAL (EA) INJECTION
Status: COMPLETED | OUTPATIENT
Start: 2020-03-12 | End: 2020-03-12

## 2020-03-12 RX ORDER — LISINOPRIL 20 MG/1
20 TABLET ORAL DAILY
Status: DISCONTINUED | OUTPATIENT
Start: 2020-03-12 | End: 2020-03-24 | Stop reason: HOSPADM

## 2020-03-12 RX ORDER — FLUTICASONE FUROATE AND VILANTEROL 100; 25 UG/1; UG/1
1 POWDER RESPIRATORY (INHALATION) DAILY
Status: DISCONTINUED | OUTPATIENT
Start: 2020-03-12 | End: 2020-03-16

## 2020-03-12 RX ORDER — VANCOMYCIN 2 GRAM/500 ML IN 0.9 % SODIUM CHLORIDE INTRAVENOUS
2000 ONCE
Status: COMPLETED | OUTPATIENT
Start: 2020-03-12 | End: 2020-03-12

## 2020-03-12 RX ORDER — ENOXAPARIN SODIUM 100 MG/ML
40 INJECTION SUBCUTANEOUS EVERY 24 HOURS
Status: DISCONTINUED | OUTPATIENT
Start: 2020-03-12 | End: 2020-03-17

## 2020-03-12 RX ADMIN — IPRATROPIUM BROMIDE AND ALBUTEROL SULFATE 3 ML: .5; 3 SOLUTION RESPIRATORY (INHALATION) at 11:03

## 2020-03-12 RX ADMIN — PIPERACILLIN AND TAZOBACTAM 4.5 G: 4; .5 INJECTION, POWDER, LYOPHILIZED, FOR SOLUTION INTRAVENOUS; PARENTERAL at 11:03

## 2020-03-12 RX ADMIN — IPRATROPIUM BROMIDE AND ALBUTEROL SULFATE 3 ML: .5; 3 SOLUTION RESPIRATORY (INHALATION) at 08:03

## 2020-03-12 RX ADMIN — METHYLPREDNISOLONE SODIUM SUCCINATE 125 MG: 125 INJECTION, POWDER, FOR SOLUTION INTRAMUSCULAR; INTRAVENOUS at 10:03

## 2020-03-12 RX ADMIN — MAGNESIUM SULFATE HEPTAHYDRATE 2 G: 40 INJECTION, SOLUTION INTRAVENOUS at 10:03

## 2020-03-12 RX ADMIN — VANCOMYCIN HYDROCHLORIDE 2000 MG: 100 INJECTION, POWDER, LYOPHILIZED, FOR SOLUTION INTRAVENOUS at 12:03

## 2020-03-12 RX ADMIN — LISINOPRIL 20 MG: 20 TABLET ORAL at 02:03

## 2020-03-12 RX ADMIN — IPRATROPIUM BROMIDE AND ALBUTEROL SULFATE 3 ML: .5; 3 SOLUTION RESPIRATORY (INHALATION) at 09:03

## 2020-03-12 NOTE — HOSPITAL COURSE
Admitted to Hospital Medicine Team 2 on 03/12/2020 for COPD exacerbation.  On admission, oxygen saturation in low 80s and ABG with pCO2 in low 70s and patient initiated on BiPAP, Q4 nebs, vanc/zosyn, methylprednisolone, Breo and Spiriva. On and off BiPAP overnight; pCO2 low 90s the next morning. Discussed need to remain on BiPAP today and BiPAP settings increased to 20/5.  Despite orders for continuous BiPAP, patient intermittently removing BiPAP.  PCO2 remains elevated.  Importance of continuous BiPAP thoroughly discussed with patient at bedside on rounds.  Pulmonology consult given patient's poor response to BiPAP over the past 36 hr.

## 2020-03-12 NOTE — ED TRIAGE NOTES
"Kt Lutz, a 54 y.o. male presents to the ED w/ complaint of SOB. New dx of COPD, O2 sat 82% on RA. Put on 2L NC, O2 sat 90%. Pt states SOB has been worsening over the last week and has "lost albuterol." +nonproductive cough x1 week. Respirations spontaneous with no accessary muscle use. Oral temp 98.7. Denies recent fever/chills. Peripheral edema to lower legs. Denies chest pain, dysuria, n/v/d, HA, vision changes. Pt states a decrease in appetite over past 2 weeks.    Triage note:  Chief Complaint   Patient presents with    Shortness of Breath     cough, hx of COPD     Review of patient's allergies indicates:   Allergen Reactions    Codeine Rash    Penicillins Rash     Past Medical History:   Diagnosis Date    COPD (chronic obstructive pulmonary disease) 2/4/2020    Hypertension      Patient identifiers verified and correct for Kt Lutz.    LOC: The patient is awake, alert and aware of environment with an appropriate affect, the patient is oriented x 3 and speaking appropriately.  APPEARANCE: Patient resting comfortably and in no acute distress, patient is clean and well groomed, patient's clothing is properly fastened.  SKIN: The skin is warm and dry, color consistent with ethnicity, patient has normal skin turgor and moist mucus membranes, skin intact, no breakdown or bruising noted.  MUSCULOSKELETAL: Patient moving all extremities spontaneously, no obvious swelling or deformities noted.  RESPIRATORY: Airway is open and patent, respirations are spontaneous, patient has a normal effort and rate, no accessory muscle use noted, bilateral breath sounds wheezes. O2 sat 82% RA, 87%-90% 2L NC.  CARDIAC: Patient has a tachy rate and regular rhythm, no periphreal edema noted, capillary refill < 3 seconds.  ABDOMEN: Soft and non tender to palpation, no distention noted, normoactive bowel sounds present in all four quadrants.  NEUROLOGIC: Eyes open spontaneously, behavior appropriate to situation, follows " commands, facial expression symmetrical, bilateral hand grasp equal and even, purposeful motor response noted, normal sensation in all extremities when touched with a finger.

## 2020-03-12 NOTE — ED PROVIDER NOTES
Encounter Date: 3/12/2020       History     Chief Complaint   Patient presents with    Shortness of Breath     cough, hx of COPD     HPI   55 Y/O M with newly diagnosed COPD on recent hospitalization 3 weeks ago returns with increased dyspnea on exertion, increased dyspnea at rest, cough and subjective malaise.  No reported fever, chills, nausea vomiting, chest pain or chest pain on exertion reported.  No abdominal pain, back pain or any extremity complaints.  No urinary complaints.  He was unconvincing noncompliance he of his newly prescribed medications.  No black or bloody stools.    Review of patient's allergies indicates:   Allergen Reactions    Codeine Rash    Penicillins Rash     Patient does not endorse any anaphylactic like reactions to PCN.  Tolerated a dose of Pip/tazo on 3/13/20     Past Medical History:   Diagnosis Date    COPD (chronic obstructive pulmonary disease) 2/4/2020    Hypertension      Past Surgical History:   Procedure Laterality Date    HERNIA REPAIR      LEG SURGERY       History reviewed. No pertinent family history.  Social History     Tobacco Use    Smoking status: Current Every Day Smoker     Packs/day: 0.50     Types: Cigarettes    Smokeless tobacco: Never Used    Tobacco comment: 10 cigarettes per day   Substance Use Topics    Alcohol use: Never     Frequency: Never     Comment: [facility]    Drug use: Not on file     Review of Systems  CONST: No fever, chills, weight change, or fatigue.  HEENT: No headache, blurry vision/change in vision, sore throat, ear pain, eye pain, otorrhea, rhinorrhea, tooth pain, swelling, or voice changes.  NECK: No pain, masses, trauma, or redness.  HEART: No pain, palpitations, or diaphoresis.  LUNG: + SOB, Cheema and cough, but orthopnea or other complaints.  ABDOMEN: No pain, nausea, vomiting, diarrhea, constipation, or flank pain.  : No discharge, dysuria, lesions, rashes, masses, sores.  EXTREMITIES: FROM with No swelling,  redness, injuries/trauma, lesions, sores, weakness, numbness, or tingling.  NEURO: No dizziness, weakness, fatigue, tremors, headache, change in vision or disturbances of balance or coordination.  SKIN: No lesions, rashes, trauma or other complaints.    Physical Exam     Initial Vitals   BP Pulse Resp Temp SpO2   03/12/20 0826 03/12/20 0833 03/12/20 0826 03/12/20 0818 03/12/20 0818   (!) 165/94 103 (!) 22 99.4 °F (37.4 °C) (!) 75 %      MAP       --                Physical Exam  PHYSICAL EXAM:  GENERAL: Anxious; Cooperative; Well-appearing and Non-Toxic; Obese; +Mild distress secondary to tachypnea and dyspnea.  HEENT: AT/NC; anicteric; speaking full sentences with no slurring of speech or drooling/inability to tolerate oral secretions.  NECK: Supple, FROM with no meningismus, no accessory muscle use. No JVD or Carotid Bruits B/L  HEART: + tachycardic rate with regular rhythm, no M/G/T.  LUNGS: + Tachypnea, but No Retractions, + scattered expiratory fine wheezing  ABDOMEN: +BS, Soft, ND, NTTP.  BACK: Atraumatic, No midline TTP to C/T/LS spine; No CVA tenderness B/L.   EXTREMITIES: FROM.  No edema or asymmetry  SKIN: Warm, Dry, No Skin Tears or Rashes.  VASCULAR: 2+ pulses Prox/Dist & Symmetrical with No delay.  NEUROLOGIC: AAOx3; no focal neurological deficits    ED Course   Procedures  Labs Reviewed   CBC W/ AUTO DIFFERENTIAL - Abnormal; Notable for the following components:       Result Value    Mean Corpuscular Hemoglobin Conc 29.2 (*)     Immature Granulocytes 0.7 (*)     Immature Grans (Abs) 0.06 (*)     Gran% 75.3 (*)     Lymph% 12.5 (*)     All other components within normal limits   COMPREHENSIVE METABOLIC PANEL - Abnormal; Notable for the following components:    CO2 35 (*)     Glucose 115 (*)     Calcium 8.3 (*)     Albumin 3.1 (*)     All other components within normal limits   HEMOGLOBIN A1C - Abnormal; Notable for the following components:    Hemoglobin A1C 6.0 (*)     All other components within normal  limits   ISTAT PROCEDURE - Abnormal; Notable for the following components:    POC PH 7.328 (*)     POC PCO2 76.8 (*)     POC HCO3 40.4 (*)     POC SATURATED O2 79 (*)     POC TCO2 43 (*)     All other components within normal limits   INFLUENZA A & B BY MOLECULAR   TROPONIN I   B-TYPE NATRIURETIC PEPTIDE   TSH   PROTIME-INR   POCT INFLUENZA A/B MOLECULAR     EKG Readings: (Independently Interpreted)   Sinus tachycardia rate of 109 with normal ventricular axis; OR/QRS/QTC interval within normal limit with no STEMI.  ____________________  Talha RADHA Alfred MD, [facility]  Emergency Medicine Staff  9:27 AM 3/12/2020       ECG Results          EKG 12-lead (Final result)  Result time 03/13/20 08:50:48    Final result by Interface, Lab In [facility] (03/13/20 08:50:48)                 Narrative:    Test Reason : R06.02,    Vent. Rate : 109 BPM     Atrial Rate : 109 BPM     P-R Int : 118 ms          QRS Dur : 082 ms      QT Int : 334 ms       P-R-T Axes : 076 077 058 degrees     QTc Int : 449 ms    Sinus tachycardia  Otherwise normal ECG  When compared with ECG of 04-FEB-2020 10:23,  No significant change was found  Confirmed by Ovi PIERRE, Karoline Méndez ([id number]) on 3/13/2020 8:50:41 AM    Referred By: RONALDERR   SELF           Confirmed By:Karoline Dior MD                            Imaging Results          X-Ray Chest AP Portable (Final result)  Result time 03/12/20 09:18:34    Final result by Kvain Scott III, MD (03/12/20 09:18:34)                 Impression:      Mild interstitial edema versus pneumonia.  A right lower or middle lobe pneumonia can not be excluded.      Electronically signed by: Kavin Scott MD  Date:    03/12/2020  Time:    09:18             Narrative:    EXAMINATION:  XR CHEST AP PORTABLE    CLINICAL HISTORY:  CHF;    FINDINGS:  Heart size is normal.  There is right basal consolidation versus atelectasis and mild interstitial edema versus pneumonia.  Heart size is upper normal.                                  Medical Decision Making:   History:   Old Medical Records: I decided to obtain old medical records.  Initial Assessment:   Afebrile, hemodynamically stable, yet tachycardic and hypoxic male with extensive history of smoking and new diagnosis of COPD presents with signs and symptoms of COPD exacerbation.  No chest pain, chest pain exertion reported.  No unusual lower extremity swelling with no history of DVT or PE.  No hemoptysis or reported sick contacts.  However, he was hospitalized 3 weeks ago for similar presentation, which likely led to the diagnosis of COPD during that hospitalization.  Will provide short-acting beta agonist, anticholinergic, steroids, and Mag while closely monitor his respiratory status.  ____________________  Talha RADHA Alfred MD, [facility]  Emergency Medicine Staff  9:40 AM 3/12/2020                Attending Attestation:         Attending Critical Care:   Critical Care Times:   Direct Patient Care (initial evaluation, reassessments, and time considering the case)................................................................25 minutes.   Additional History from reviewing old medical records or taking additional history from the family, EMS, PCP, etc.......................5 minutes.   Ordering, Reviewing, and Interpreting Diagnostic Studies...............................................................................................................5 minutes.   Documentation..................................................................................................................................................................................5 minutes.   Consultation with other Physicians. .................................................................................................................................................5 minutes.   ==============================================================  · Total Critical Care Time - exclusive of procedural time: 45  minutes.  ==============================================================  Critical care was necessary to treat or prevent imminent or life-threatening deterioration of the following conditions: COPD exacerbation.   Critical care was time spent personally by me on the following activities: obtaining history from patient or relative, examination of patient, review of x-rays / CT sent with the patient, review of old charts, ordering lab, x-rays, and/or EKG, development of treatment plan with patient or relative, ordering and performing treatments and interventions, evaluation of patient's response to treatment, interpretation of cardiac measurements and re-evaluation of patient's conition.                             Clinical Impression:       ICD-10-CM ICD-9-CM   1. COPD exacerbation J44.1 491.21   2. Shortness of breath R06.02 786.05   3. Healthcare-associated pneumonia J18.9 486   4. Hypoxia R09.02 799.02   5. Acute respiratory failure with hypoxia J96.01 518.81   6. Chest pain R07.9 786.50   7. COPD with acute exacerbation J44.1 491.21         Disposition:   Disposition: Admitted  Condition: Fair     ED Disposition Condition    Admit                           Solomon Alfred MD  03/14/20 0417

## 2020-03-12 NOTE — ASSESSMENT & PLAN NOTE
- Chronic, controlled.    - Home meds for hypertension were reviewed and noted below.   - Hospital anti-hypertensive changes were made as shown below.  Hypertension Medications             lisinopril (PRINIVIL,ZESTRIL) 20 MG tablet Take 1 tablet (20 mg total) by mouth once daily.      Hospital Medications             lisinopriL tablet 20 mg 20 mg, Oral, Daily        Will utilize p.r.n. blood pressure medication only if patient's blood pressure greater than  180/110 and he develops symptoms such as worsening chest pain or shortness of breath.

## 2020-03-12 NOTE — ASSESSMENT & PLAN NOTE
54M admitted with acute COPD exacerbation associated with hypoxic and hypercapnic respiratory failure    Plan:  - Respiratory infection panel, blood/sputum cultures pending  - Vancomycin and Zosyn  - Duonebs Q4  - Fluticasone-vilanterol inhailer qd  - Tiotropium qd  - Continuous BiPAP with scheduled breaks to eat if tolerated  - Methylprednisolone 125 mg x1 in ED   - Lovenox DVT prophylaxix     - Daily CBC, CMP, Mg, and Phos  - Monitor respiratory status closely

## 2020-03-12 NOTE — HPI
Mr. Lutz is a 54 year-old male with a past medical history of tobacco abuse (current active smoker), COPD (recently diagnosed on hospitalization 3 weeks ago), alcohol/drug abuse (in remission) who presents to emergency department of [facility] on 03/12/2020 for with shortness of breath, malaise and dry cough for 2 weeks.  Since his symptoms began, they have progressively worsened.  At onset, the patient noted minimal fatigue and dyspnea on exertion which did not overtly affect his daily routine.  Currently, the patient reports dyspnea at rest which markedly worsens with minimal exertion.  He also endorses progressively worsening fatigue and nonproductive cough during the same time. He is a current smoker with >40 pack-year smoking history.  Normally the patient smokes 1/2 packs of cigarettes per day however since the onset of his symptoms he has cut down to only 4 cigarettes daily.  He denies fevers, chills, aches, just abdominal pain, nausea, vomiting, diarrhea, constipation, abdominal pain, dysuria.  He does endorse occasional headaches which occur during the evening time most days.  He reports his last alcoholic beverage was approximately 6 months ago and has been living in a senior care house for his alcohol and drug abuse problems.  He denies utilizing IV drugs but has struggled with narcotic, meth, marijuana, and cocaine in the past.

## 2020-03-12 NOTE — ASSESSMENT & PLAN NOTE
- Goal saturation 88-92% given COPD  - BiPAP 15/5 40% O2 Continuous   - VBG PRN    Latest VBG:  Recent Labs     03/12/20  1236   PH 7.328*   PCO2 76.8*   PO2 49   HCO3 40.4*   POCSATURATED 79*   BE 14

## 2020-03-12 NOTE — H&P
[facility] Hospital Medicine  History & Physical    Patient Name: Kt Lutz  MRN: [id number]  Admission Date: 3/12/2020  Attending Physician: Shashi Berger MD   Primary Care Provider: Primary Doctor [facility] Medicine Team: [facility] MED 2 Kwan Pool MD     Patient information was obtained from patient, past medical records and ER records.     Subjective:     Principal Problem:COPD with acute exacerbation    Chief Complaint:   Chief Complaint   Patient presents with    Shortness of Breath     cough, hx of COPD        HPI: Mr. Lutz is a 54 year-old male with a past medical history of tobacco abuse (current active smoker), COPD (recently diagnosed on hospitalization 3 weeks ago), alcohol/drug abuse (in remission) who presents to emergency department of [facility] on 03/12/2020 for with shortness of breath, malaise and dry cough for 2 weeks.  Since his symptoms began, they have progressively worsened.  At onset, the patient noted minimal fatigue and dyspnea on exertion which did not overtly affect his daily routine.  Currently, the patient reports dyspnea at rest which markedly worsens with minimal exertion.  He also endorses progressively worsening fatigue and nonproductive cough during the same time. He is a current smoker with >40 pack-year smoking history.  Normally the patient smokes 1/2 packs of cigarettes per day however since the onset of his symptoms he has cut down to only 4 cigarettes daily.  He denies fevers, chills, aches, just abdominal pain, nausea, vomiting, diarrhea, constipation, abdominal pain, dysuria.  He does endorse occasional headaches which occur during the evening time most days.  He reports his last alcoholic beverage was approximately 6 months ago and has been living in a [facility] house for his alcohol and drug abuse problems.  He denies utilizing IV drugs but has struggled with narcotic, meth, marijuana, and cocaine in the past.       Past  Medical History:   Diagnosis Date    COPD (chronic obstructive pulmonary disease) 2/4/2020    Hypertension        Past Surgical History:   Procedure Laterality Date    HERNIA REPAIR      LEG SURGERY         Review of patient's allergies indicates:   Allergen Reactions    Codeine Rash    Penicillins Rash       No current facility-administered medications on file prior to encounter.      Current Outpatient Medications on File Prior to Encounter   Medication Sig    albuterol (PROVENTIL/VENTOLIN HFA) 90 mcg/actuation inhaler Inhale 2 puffs into the lungs every 6 (six) hours as needed for Wheezing or Shortness of Breath. Rescue    budesonide-formoterol 80-4.5 mcg (SYMBICORT) 80-4.5 mcg/actuation HFAA Inhale 2 puffs into the lungs 2 (two) times daily. Controller    lisinopril (PRINIVIL,ZESTRIL) 20 MG tablet Take 1 tablet (20 mg total) by mouth once daily.    tiotropium (SPIRIVA) 18 mcg inhalation capsule Inhale 1 capsule (18 mcg total) into the lungs once daily. Controller     Family History     None        Tobacco Use    Smoking status: Current Every Day Smoker     Packs/day: 0.50     Types: Cigarettes    Smokeless tobacco: Never Used    Tobacco comment: 10 cigarettes per day   Substance and Sexual Activity    Alcohol use: Never     Frequency: Never     Comment: [facility] [employer] [facility]    Drug use: Not on file    Sexual activity: Never     Review of Systems   Constitutional: Positive for fatigue. Negative for chills, diaphoresis and fever.   HENT: Negative for congestion.    Eyes: Negative for visual disturbance.   Respiratory: Positive for cough ( Nonproductive), shortness of breath ( Exertional and at rest) and wheezing. Negative for apnea, choking and chest tightness.    Cardiovascular: Negative for chest pain, palpitations and leg swelling.   Gastrointestinal: Negative for abdominal distention, abdominal pain, constipation, diarrhea and nausea.   Genitourinary: Negative for dysuria.    Musculoskeletal: Negative for back pain and myalgias.   Skin: Negative for rash.   Allergic/Immunologic: Negative for immunocompromised state.   Neurological: Positive for headaches. Negative for weakness.   Psychiatric/Behavioral: Negative for agitation.     Objective:     Vital Signs (Most Recent):  Temp: 99.4 °F (37.4 °C) (03/12/20 0818)  Pulse: 96 (03/12/20 1431)  Resp: 13 (03/12/20 1431)  BP: 133/70 (03/12/20 1431)  SpO2: (!) 92 % (03/12/20 1444) Vital Signs (24h Range):  Temp:  [99.4 °F (37.4 °C)] 99.4 °F (37.4 °C)  Pulse:  [] 96  Resp:  [13-32] 13  SpO2:  [75 %-95 %] 92 %  BP: (106-167)/(53-94) 133/70     Weight: 99.8 kg (220 lb)  Body mass index is 33.45 kg/m².    Physical Exam   Constitutional: He is oriented to person, place, and time. He appears well-developed and well-nourished. No distress.   Obease   HENT:   Head: Normocephalic and atraumatic.   Mouth/Throat: No oropharyngeal exudate.   Eyes: Pupils are equal, round, and reactive to light. EOM are normal. No scleral icterus.   Neck: Normal range of motion. Neck supple.   Cardiovascular: Normal rate, regular rhythm and normal heart sounds.   No murmur heard.  Pulmonary/Chest: Effort normal. No stridor. No respiratory distress. He has wheezes. He has no rales. He exhibits no tenderness.   SpO2 81% on 3L NC  Speaking in complete sentences   Abdominal: Soft. Bowel sounds are normal. He exhibits no distension and no mass. There is no tenderness.   Musculoskeletal: Normal range of motion. He exhibits no edema.   Neurological: He is alert and oriented to person, place, and time.   Skin: Skin is warm. No rash noted. He is not diaphoretic.   Psychiatric: He has a normal mood and affect.         CRANIAL NERVES     CN III, IV, VI   Pupils are equal, round, and reactive to light.  Extraocular motions are normal.        Significant Labs:   ABGs:   Recent Labs   Lab 03/12/20  1236   PH 7.328*   PCO2 76.8*   HCO3 40.4*   POCSATURATED 79*   BE 14     CBC:    Recent Labs   Lab 03/12/20  0920   WBC 9.15   HGB 14.2   HCT 48.6        CMP:   Recent Labs   Lab 03/12/20  0920      K 4.3      CO2 35*   *   BUN 12   CREATININE 0.8   CALCIUM 8.3*   PROT 6.8   ALBUMIN 3.1*   BILITOT 0.3   ALKPHOS 73   AST 21   ALT 29   ANIONGAP 8   EGFRNONAA >60.0     Cardiac Markers:   Recent Labs   Lab 03/12/20  0920   BNP 28     All pertinent labs within the past 24 hours have been reviewed.    Significant Imaging: I have reviewed all pertinent imaging results/findings within the past 24 hours.    Assessment/Plan:     * COPD with acute exacerbation  54M admitted with acute COPD exacerbation associated with hypoxic and hypercapnic respiratory failure    Plan:  - Respiratory infection panel, blood/sputum cultures pending  - Vancomycin and Zosyn  - Duonebs Q4  - Fluticasone-vilanterol inhailer qd  - Tiotropium qd  - Continuous BiPAP with scheduled breaks to eat if tolerated  - Methylprednisolone 125 mg x1 in ED   - Lovenox DVT prophylaxix     - Daily CBC, CMP, Mg, and Phos  - Monitor respiratory status closely        Acute respiratory failure with hypoxia and hypercapnia  - Goal saturation 88-92% given COPD  - BiPAP 15/5 40% O2 Continuous   - VBG PRN    Latest VBG:  Recent Labs     03/12/20  1236   PH 7.328*   PCO2 76.8*   PO2 49   HCO3 40.4*   POCSATURATED 79*   BE 14           Essential hypertension  - Chronic, controlled.    - Home meds for hypertension were reviewed and noted below.   - Hospital anti-hypertensive changes were made as shown below.  Hypertension Medications             lisinopril (PRINIVIL,ZESTRIL) 20 MG tablet Take 1 tablet (20 mg total) by mouth once daily.      Hospital Medications             lisinopriL tablet 20 mg 20 mg, Oral, Daily        Will utilize p.r.n. blood pressure medication only if patient's blood pressure greater than  180/110 and he develops symptoms such as worsening chest pain or shortness of breath.        Tobacco abuse  -  Counseled on smoking cessation  - Nicotine patch p.r.n.        VTE Risk Mitigation (From admission, onward)         Ordered     enoxaparin injection 40 mg  Daily      03/12/20 1249     IP VTE HIGH RISK PATIENT  Once      03/12/20 1249                   Kwan Pool MD  Department of Hospital Medicine   [facility]

## 2020-03-12 NOTE — SUBJECTIVE & OBJECTIVE
Past Medical History:   Diagnosis Date    COPD (chronic obstructive pulmonary disease) 2/4/2020    Hypertension        Past Surgical History:   Procedure Laterality Date    HERNIA REPAIR      LEG SURGERY         Review of patient's allergies indicates:   Allergen Reactions    Codeine Rash    Penicillins Rash       No current facility-administered medications on file prior to encounter.      Current Outpatient Medications on File Prior to Encounter   Medication Sig    albuterol (PROVENTIL/VENTOLIN HFA) 90 mcg/actuation inhaler Inhale 2 puffs into the lungs every 6 (six) hours as needed for Wheezing or Shortness of Breath. Rescue    budesonide-formoterol 80-4.5 mcg (SYMBICORT) 80-4.5 mcg/actuation HFAA Inhale 2 puffs into the lungs 2 (two) times daily. Controller    lisinopril (PRINIVIL,ZESTRIL) 20 MG tablet Take 1 tablet (20 mg total) by mouth once daily.    tiotropium (SPIRIVA) 18 mcg inhalation capsule Inhale 1 capsule (18 mcg total) into the lungs once daily. Controller     Family History     None        Tobacco Use    Smoking status: Current Every Day Smoker     Packs/day: 0.50     Types: Cigarettes    Smokeless tobacco: Never Used    Tobacco comment: 10 cigarettes per day   Substance and Sexual Activity    Alcohol use: Never     Frequency: Never     Comment: In [facility]    Drug use: Not on file    Sexual activity: Never     Review of Systems   Constitutional: Positive for fatigue. Negative for chills, diaphoresis and fever.   HENT: Negative for congestion.    Eyes: Negative for visual disturbance.   Respiratory: Positive for cough ( Nonproductive), shortness of breath ( Exertional and at rest) and wheezing. Negative for apnea, choking and chest tightness.    Cardiovascular: Negative for chest pain, palpitations and leg swelling.   Gastrointestinal: Negative for abdominal distention, abdominal pain, constipation, diarrhea and nausea.   Genitourinary: Negative for dysuria.    Musculoskeletal: Negative for back pain and myalgias.   Skin: Negative for rash.   Allergic/Immunologic: Negative for immunocompromised state.   Neurological: Positive for headaches. Negative for weakness.   Psychiatric/Behavioral: Negative for agitation.     Objective:     Vital Signs (Most Recent):  Temp: 99.4 °F (37.4 °C) (03/12/20 0818)  Pulse: 96 (03/12/20 1431)  Resp: 13 (03/12/20 1431)  BP: 133/70 (03/12/20 1431)  SpO2: (!) 92 % (03/12/20 1444) Vital Signs (24h Range):  Temp:  [99.4 °F (37.4 °C)] 99.4 °F (37.4 °C)  Pulse:  [] 96  Resp:  [13-32] 13  SpO2:  [75 %-95 %] 92 %  BP: (106-167)/(53-94) 133/70     Weight: 99.8 kg (220 lb)  Body mass index is 33.45 kg/m².    Physical Exam   Constitutional: He is oriented to person, place, and time. He appears well-developed and well-nourished. No distress.   Obease   HENT:   Head: Normocephalic and atraumatic.   Mouth/Throat: No oropharyngeal exudate.   Eyes: Pupils are equal, round, and reactive to light. EOM are normal. No scleral icterus.   Neck: Normal range of motion. Neck supple.   Cardiovascular: Normal rate, regular rhythm and normal heart sounds.   No murmur heard.  Pulmonary/Chest: Effort normal. No stridor. No respiratory distress. He has wheezes. He has no rales. He exhibits no tenderness.   SpO2 81% on 3L NC  Speaking in complete sentences   Abdominal: Soft. Bowel sounds are normal. He exhibits no distension and no mass. There is no tenderness.   Musculoskeletal: Normal range of motion. He exhibits no edema.   Neurological: He is alert and oriented to person, place, and time.   Skin: Skin is warm. No rash noted. He is not diaphoretic.   Psychiatric: He has a normal mood and affect.         CRANIAL NERVES     CN III, IV, VI   Pupils are equal, round, and reactive to light.  Extraocular motions are normal.        Significant Labs:   ABGs:   Recent Labs   Lab 03/12/20  1236   PH 7.328*   PCO2 76.8*   HCO3 40.4*   POCSATURATED 79*   BE 14     CBC:    Recent Labs   Lab 03/12/20  0920   WBC 9.15   HGB 14.2   HCT 48.6        CMP:   Recent Labs   Lab 03/12/20  0920      K 4.3      CO2 35*   *   BUN 12   CREATININE 0.8   CALCIUM 8.3*   PROT 6.8   ALBUMIN 3.1*   BILITOT 0.3   ALKPHOS 73   AST 21   ALT 29   ANIONGAP 8   EGFRNONAA >60.0     Cardiac Markers:   Recent Labs   Lab 03/12/20  0920   BNP 28     All pertinent labs within the past 24 hours have been reviewed.    Significant Imaging: I have reviewed all pertinent imaging results/findings within the past 24 hours.

## 2020-03-12 NOTE — ED NOTES
MD notified when pt de-sat to 84% and called back to say to increase NC from 3L to 4L and make sure that the pt does not exceed 92%.

## 2020-03-12 NOTE — MEDICAL/APP STUDENT
..  HISTORY & PHYSICAL  Hospital Medicine    Team: [facility] HOS[facility] MED 2    Patient Name: Kt Lutz  YOB: 1965    Admit Date: 3/12/2020                   LOS: 0    PRESENTING HISTORY     Chief Complaint/Reason for Admission: COPD with acute exacerbation    History of Present Illness:  Mr. Kt Lutz is a 54 y.o. male with a PMHX of COPD diagnosed one month ago and hypertension on lisinopril, admitted for SOB. Pt reports SOB and a non-productive cough began 2 weeks ago. Pt reports increased dyspnea on exertion, somewhat relieved by rest.   He was using albuterol, spiriva, and symbicort for SOB but recently lost his inhaler. Patient self-admitted to the ED to  more. He reports at the time of admission he was still able to go to work and do his daily activities.    Patient also reports a decrease in appetite lasting two weeks. Denies chest pain, abdominal pain, and fever. Denies recent weight loss, chills, night sweats, or changes in sleep.    DDX  Copd exacerbation  - pneumonia  -       ROS        PAST HISTORY:     Past Medical History:   Diagnosis Date    COPD (chronic obstructive pulmonary disease) 2/4/2020    Hypertension        Past Surgical History:   Procedure Laterality Date    HERNIA REPAIR      LEG SURGERY         History reviewed. No pertinent family history.    Social History     Socioeconomic History    Marital status: Single     Spouse name: Not on file    Number of children: Not on file    Years of education: Not on file    Highest education level: Not on file   Occupational History    Not on file   Social Needs    Financial resource strain: Not on file    Food insecurity:     Worry: Not on file     Inability: Not on file    Transportation needs:     Medical: Not on file     Non-medical: Not on file   Tobacco Use    Smoking status: Current Every Day Smoker     Packs/day: 0.50     Types: Cigarettes    Smokeless tobacco: Never Used    Tobacco comment: 10 cigarettes per day    Substance and Sexual Activity    Alcohol use: Never     Frequency: Never     Comment: In [employer] program    Drug use: Not on file    Sexual activity: Never   Lifestyle    Physical activity:     Days per week: Not on file     Minutes per session: Not on file    Stress: Not on file   Relationships    Social connections:     Talks on phone: Not on file     Gets together: Not on file     Attends L[employer]-day service: Not on file     Active member of club or organization: Not on file     Attends meetings of clubs or organizations: Not on file     Relationship status: Not on file   Other Topics Concern    Not on file   Social History Narrative    Not on file       MEDICATIONS & ALLERGIES:     No current facility-administered medications on file prior to encounter.      Current Outpatient Medications on File Prior to Encounter   Medication Sig    albuterol (PROVENTIL/VENTOLIN HFA) 90 mcg/actuation inhaler Inhale 2 puffs into the lungs every 6 (six) hours as needed for Wheezing or Shortness of Breath. Rescue    budesonide-formoterol 80-4.5 mcg (SYMBICORT) 80-4.5 mcg/actuation HFAA Inhale 2 puffs into the lungs 2 (two) times daily. Controller    lisinopril (PRINIVIL,ZESTRIL) 20 MG tablet Take 1 tablet (20 mg total) by mouth once daily.    tiotropium (SPIRIVA) 18 mcg inhalation capsule Inhale 1 capsule (18 mcg total) into the lungs once daily. Controller       Review of patient's allergies indicates:   Allergen Reactions    Codeine Rash    Penicillins Rash       OBJECTIVE:     Vital Signs:  Temp:  [99.4 °F (37.4 °C)] 99.4 °F (37.4 °C)  Pulse:  [] 97  Resp:  [13-32] 17  SpO2:  [75 %-95 %] 91 %  BP: (106-167)/(53-94) 135/72  Body mass index is 33.45 kg/m².       Physical Exam       Laboratory  Recent Labs   Lab 03/12/20  0920   WBC 9.15   HGB 14.2   HCT 48.6      MONO 8.2  0.8   EOSINOPHIL 2.8     Recent Labs   Lab 03/12/20  0920      K 4.3      CO2 35*   BUN 12   CREATININE 0.8  "  *   CALCIUM 8.3*   AST 21   ALT 29   ALKPHOS 73   BILITOT 0.3   PROT 6.8   ALBUMIN 3.1*     Recent Labs   Lab 03/12/20  0920   TROPONINI 0.010   BNP 28     Recent Labs   Lab 03/12/20  1236   PH 7.328*   PCO2 76.8*   PO2 49   HCO3 40.4*     @HGBA1C:3)@  Lab Results   Component Value Date    HGBA1C 6.0 (H) 03/12/2020       Diagnostic Results:  {Results; Diagnostics:[id number]::"***"}    ASSESSMENT & PLAN:   Mr. Kt Lutz is a 54 y.o. male    Current Problems List:  Active Hospital Problems    Diagnosis  POA    *COPD with acute exacerbation [J44.1]  Yes    Acute respiratory failure with hypoxia and hypercapnia [J96.01, J96.02]  Yes    Essential hypertension [I10]  Yes    Tobacco abuse [Z72.0]  Yes      Resolved Hospital Problems   No resolved problems to display.       Problem Assessment & Treatment Plan:  Principle problem:        Active problems:            All other problems stable.    Signing Physician:      "

## 2020-03-13 LAB
ALBUMIN SERPL BCP-MCNC: 2.8 G/DL (ref 3.5–5.2)
ALLENS TEST: ABNORMAL
ALP SERPL-CCNC: 67 U/L (ref 55–135)
ALT SERPL W/O P-5'-P-CCNC: 34 U/L (ref 10–44)
ANION GAP SERPL CALC-SCNC: 8 MMOL/L (ref 8–16)
AST SERPL-CCNC: 21 U/L (ref 10–40)
BASOPHILS # BLD AUTO: 0.02 K/UL (ref 0–0.2)
BASOPHILS NFR BLD: 0.2 % (ref 0–1.9)
BILIRUB SERPL-MCNC: 0.2 MG/DL (ref 0.1–1)
BUN SERPL-MCNC: 17 MG/DL (ref 6–20)
CALCIUM SERPL-MCNC: 7.7 MG/DL (ref 8.7–10.5)
CHLORIDE SERPL-SCNC: 102 MMOL/L (ref 95–110)
CO2 SERPL-SCNC: 35 MMOL/L (ref 23–29)
CREAT SERPL-MCNC: 1 MG/DL (ref 0.5–1.4)
DELSYS: ABNORMAL
DIFFERENTIAL METHOD: ABNORMAL
EOSINOPHIL # BLD AUTO: 0 K/UL (ref 0–0.5)
EOSINOPHIL NFR BLD: 0.1 % (ref 0–8)
EP: 5
ERYTHROCYTE [DISTWIDTH] IN BLOOD BY AUTOMATED COUNT: 14.2 % (ref 11.5–14.5)
ERYTHROCYTE [SEDIMENTATION RATE] IN BLOOD BY WESTERGREN METHOD: 12 MM/H
EST. GFR  (AFRICAN AMERICAN): >60 ML/MIN/1.73 M^2
EST. GFR  (NON AFRICAN AMERICAN): >60 ML/MIN/1.73 M^2
FIO2: 21
FIO2: 30
FIO2: 30
FLOW: 3
GLUCOSE SERPL-MCNC: 102 MG/DL (ref 70–110)
HCO3 UR-SCNC: 36.2 MMOL/L (ref 24–28)
HCO3 UR-SCNC: 37.9 MMOL/L (ref 24–28)
HCO3 UR-SCNC: 38.1 MMOL/L (ref 24–28)
HCO3 UR-SCNC: 39.8 MMOL/L (ref 24–28)
HCO3 UR-SCNC: 41 MMOL/L (ref 24–28)
HCT VFR BLD AUTO: 44.8 % (ref 40–54)
HGB BLD-MCNC: 13 G/DL (ref 14–18)
IMM GRANULOCYTES # BLD AUTO: 0.05 K/UL (ref 0–0.04)
IMM GRANULOCYTES NFR BLD AUTO: 0.5 % (ref 0–0.5)
IP: 20
LYMPHOCYTES # BLD AUTO: 0.9 K/UL (ref 1–4.8)
LYMPHOCYTES NFR BLD: 7.9 % (ref 18–48)
MAGNESIUM SERPL-MCNC: 2.2 MG/DL (ref 1.6–2.6)
MCH RBC QN AUTO: 29.1 PG (ref 27–31)
MCHC RBC AUTO-ENTMCNC: 29 G/DL (ref 32–36)
MCV RBC AUTO: 100 FL (ref 82–98)
MIN VOL: 16
MODE: ABNORMAL
MONOCYTES # BLD AUTO: 1.1 K/UL (ref 0.3–1)
MONOCYTES NFR BLD: 9.7 % (ref 4–15)
NEUTROPHILS # BLD AUTO: 9 K/UL (ref 1.8–7.7)
NEUTROPHILS NFR BLD: 81.6 % (ref 38–73)
NRBC BLD-RTO: 0 /100 WBC
PCO2 BLDA: 67.5 MMHG (ref 35–45)
PCO2 BLDA: 75.2 MMHG (ref 35–45)
PCO2 BLDA: 79 MMHG (ref 35–45)
PCO2 BLDA: 92.8 MMHG (ref 35–45)
PCO2 BLDA: 93.4 MMHG (ref 35–45)
PH SMN: 7.22 [PH] (ref 7.35–7.45)
PH SMN: 7.24 [PH] (ref 7.35–7.45)
PH SMN: 7.31 [PH] (ref 7.35–7.45)
PH SMN: 7.32 [PH] (ref 7.35–7.45)
PH SMN: 7.34 [PH] (ref 7.35–7.45)
PHOSPHATE SERPL-MCNC: 4.4 MG/DL (ref 2.7–4.5)
PLATELET # BLD AUTO: 307 K/UL (ref 150–350)
PMV BLD AUTO: 9.4 FL (ref 9.2–12.9)
PO2 BLDA: 27 MMHG (ref 40–60)
PO2 BLDA: 43 MMHG (ref 40–60)
PO2 BLDA: 55 MMHG (ref 40–60)
PO2 BLDA: 63 MMHG (ref 40–60)
PO2 BLDA: 63 MMHG (ref 40–60)
POC BE: 10 MMOL/L
POC BE: 10 MMOL/L
POC BE: 12 MMOL/L
POC BE: 12 MMOL/L
POC BE: 15 MMOL/L
POC SATURATED O2: 36 % (ref 95–100)
POC SATURATED O2: 66 % (ref 95–100)
POC SATURATED O2: 85 % (ref 95–100)
POC SATURATED O2: 88 % (ref 95–100)
POC SATURATED O2: 88 % (ref 95–100)
POC TCO2: 38 MMOL/L (ref 24–29)
POC TCO2: 40 MMOL/L (ref 24–29)
POC TCO2: 41 MMOL/L (ref 24–29)
POC TCO2: 43 MMOL/L (ref 24–29)
POC TCO2: 43 MMOL/L (ref 24–29)
POTASSIUM SERPL-SCNC: 4.2 MMOL/L (ref 3.5–5.1)
PROT SERPL-MCNC: 5.9 G/DL (ref 6–8.4)
RBC # BLD AUTO: 4.47 M/UL (ref 4.6–6.2)
SAMPLE: ABNORMAL
SITE: ABNORMAL
SODIUM SERPL-SCNC: 145 MMOL/L (ref 136–145)
SP02: 82
SP02: 90
SP02: 91
SPONT RATE: 30
WBC # BLD AUTO: 10.98 K/UL (ref 3.9–12.7)

## 2020-03-13 PROCEDURE — 63600175 PHARM REV CODE 636 W HCPCS: Performed by: STUDENT IN AN ORGANIZED HEALTH CARE EDUCATION/TRAINING PROGRAM

## 2020-03-13 PROCEDURE — 80053 COMPREHEN METABOLIC PANEL: CPT

## 2020-03-13 PROCEDURE — 94761 N-INVAS EAR/PLS OXIMETRY MLT: CPT

## 2020-03-13 PROCEDURE — 99232 PR SUBSEQUENT HOSPITAL CARE,LEVL II: ICD-10-PCS | Mod: ,,, | Performed by: HOSPITALIST

## 2020-03-13 PROCEDURE — 84100 ASSAY OF PHOSPHORUS: CPT

## 2020-03-13 PROCEDURE — 36415 COLL VENOUS BLD VENIPUNCTURE: CPT

## 2020-03-13 PROCEDURE — 83735 ASSAY OF MAGNESIUM: CPT

## 2020-03-13 PROCEDURE — 85025 COMPLETE CBC W/AUTO DIFF WBC: CPT

## 2020-03-13 PROCEDURE — 25000003 PHARM REV CODE 250: Performed by: STUDENT IN AN ORGANIZED HEALTH CARE EDUCATION/TRAINING PROGRAM

## 2020-03-13 PROCEDURE — 27000221 HC OXYGEN, UP TO 24 HOURS

## 2020-03-13 PROCEDURE — 94799 UNLISTED PULMONARY SVC/PX: CPT

## 2020-03-13 PROCEDURE — 36416 COLLJ CAPILLARY BLOOD SPEC: CPT

## 2020-03-13 PROCEDURE — 82803 BLOOD GASES ANY COMBINATION: CPT

## 2020-03-13 PROCEDURE — 99232 SBSQ HOSP IP/OBS MODERATE 35: CPT | Mod: ,,, | Performed by: HOSPITALIST

## 2020-03-13 PROCEDURE — 11000001 HC ACUTE MED/SURG PRIVATE ROOM

## 2020-03-13 PROCEDURE — 25000242 PHARM REV CODE 250 ALT 637 W/ HCPCS: Performed by: STUDENT IN AN ORGANIZED HEALTH CARE EDUCATION/TRAINING PROGRAM

## 2020-03-13 PROCEDURE — 27000190 HC CPAP FULL FACE MASK W/VALVE

## 2020-03-13 PROCEDURE — 94640 AIRWAY INHALATION TREATMENT: CPT

## 2020-03-13 PROCEDURE — 94660 CPAP INITIATION&MGMT: CPT

## 2020-03-13 PROCEDURE — 63600175 PHARM REV CODE 636 W HCPCS: Performed by: HOSPITALIST

## 2020-03-13 PROCEDURE — 99900035 HC TECH TIME PER 15 MIN (STAT)

## 2020-03-13 RX ADMIN — PREDNISONE 40 MG: 10 TABLET ORAL at 09:03

## 2020-03-13 RX ADMIN — ENOXAPARIN SODIUM 40 MG: 100 INJECTION SUBCUTANEOUS at 05:03

## 2020-03-13 RX ADMIN — IPRATROPIUM BROMIDE AND ALBUTEROL SULFATE 3 ML: .5; 3 SOLUTION RESPIRATORY (INHALATION) at 08:03

## 2020-03-13 RX ADMIN — IPRATROPIUM BROMIDE AND ALBUTEROL SULFATE 3 ML: .5; 3 SOLUTION RESPIRATORY (INHALATION) at 04:03

## 2020-03-13 RX ADMIN — IPRATROPIUM BROMIDE AND ALBUTEROL SULFATE 3 ML: .5; 3 SOLUTION RESPIRATORY (INHALATION) at 12:03

## 2020-03-13 RX ADMIN — LISINOPRIL 20 MG: 20 TABLET ORAL at 09:03

## 2020-03-13 RX ADMIN — TIOTROPIUM BROMIDE 18 MCG: 18 CAPSULE ORAL; RESPIRATORY (INHALATION) at 11:03

## 2020-03-13 RX ADMIN — PIPERACILLIN AND TAZOBACTAM 4.5 G: 4; .5 INJECTION, POWDER, LYOPHILIZED, FOR SOLUTION INTRAVENOUS; PARENTERAL at 03:03

## 2020-03-13 RX ADMIN — IPRATROPIUM BROMIDE AND ALBUTEROL SULFATE 3 ML: .5; 3 SOLUTION RESPIRATORY (INHALATION) at 03:03

## 2020-03-13 RX ADMIN — FLUTICASONE FUROATE AND VILANTEROL TRIFENATATE 1 PUFF: 100; 25 POWDER RESPIRATORY (INHALATION) at 11:03

## 2020-03-13 NOTE — ED NOTES
Pt care assumed, report received by DELPHINE Stearns. Pt currently waiting for transport. Tele box on pt.

## 2020-03-13 NOTE — CARE UPDATE
Rapid Response Respiratory Therapy Proactive Rounding Note      Time of visit: 0850     Code Status: Full Code   : 1965  Bed: 650/650 A:   MRN: [id number]    SITUATION     Evaluated patient for: Non-Invasive Positive Pressure Ventilation Compliance     BACKGROUND     Patient has a past medical history of COPD (chronic obstructive pulmonary disease) and Hypertension.    ASSESSMENT/INTERVENTIONS     Upon arrival in room pt had taken himself off the bipap, nc was at bedside, pt was 86% on room air. Placed pt on 3lpm nasal cannula and spoke to pt about wearing the bipap all the time for now b/c his CO2 is critical. Paged team to inform them of noncompliance.    Pulse: 88 Respiratory rate: 20 SpO2: 89   Level of Consciousness: Level of Consciousness (AVPU): alert  Respiratory Effort: Respiratory Effort: Unlabored Expansion/Accessory Muscle Usage: Expansion/Accessory Muscles/Retractions: no use of accessory muscles  All Lung Field Breath Sounds: All Lung Fields Breath Sounds: diminished  DOROTHY Breath Sounds: diminished  LLL Breath Sounds: diminished  RUL Breath Sounds: diminished, clear  RML Breath Sounds: diminished, clear  RLL Breath Sounds: diminished, clear  O2 Device/Concentration: bipap 30%  Most recent blood gas:   Recent Labs     20  0947   PH 7.219*   PCO2 92.8*   PO2 43   HCO3 37.9*   POCSATURATED 66*   BE 10     NIPPV: Yes, Type: bipap Settings: 15/5   Ambu at bedside: Ambu bag with the patient?: Yes, Adult Ambu    Current Respiratory Care Orders:     Ordered    20  POCT Venous Blood Gas Q4H Every 4 hours (3 of 3 released)     Comments: This test should be used for VBGs.  If using this order for other tests (K, creatinine, HCT, PT/INR, lactate etc)  ONLY do so in the case of an emergency or rapid response.   Notify Physician if: see parameters below.   Question: Component: Answer: Blood Gas    20 1622   20 1900  Incentive spirometry Every 6 hours (5 of 25 released)     Release    03/12/20 1601   03/12/20 1617  Bipap Continuous Continuous (8 of 38 released)    Release   Comments: Adjust to maintain O2 saturation 88%-92%   Question Answer Comment   FiO2% 30    Inspiratory pressure: 15    Expiratory pressure: 5        03/12/20 1403   03/12/20 1600  Pulse Oximetry Q4H Every 4 hours (9 of 38 released)    Release    03/12/20 1249   03/12/20 1600  Inhalation Treatment Q4H Every 4 hours (9 of 38 released)    Release    03/12/20 1251      Cardiographics Orders   (From admission, onward)   Start   Ordered   Unscheduled  EKG 12-lead Use PRN (0 of 34361 released)    Release   Comments: For new or worsening of chest pain.   Question: Diagnosis Answer: Chest pain    03/12/20 1249      IP Meds - Nasal and Inhaled   Comment  Hide   (From admission, onward)      Last edited by  on  at    Start   Stop Status Route Frequency Ordered   03/12/20 1600  albuterol-ipratropium 2.5 mg-0.5 mg/3 mL nebulizer solution 3 mL (albuterol-ipratropium (DUO-NEB) 0.5 - 3 mg (2.5 mg base)/ 3 mL nebulizer panel)    Discontinue    -- Dispensed NEBULIZATION Every 4 hours 03/12/20 1251   03/12/20 1345  fluticasone furoate-vilanteroL 100-25 mcg/dose diskus inhaler 1 puff    Discontinue    -- Dispensed Inhl Daily 03/12/20 1249   03/12/20 1345  tiotropium inhalation capsule 18 mcg    Discontinue    -- Dispensed Inhl Daily 03/12/20 1249   03/12/20 1341  albuterol inhaler 2 puff    Discontinue    -- Dispensed Inhl Every 6 hours PRN          RECOMMENDATIONS     We recommend: frequent checks on pt, continued education to pt on importance of bipap    ESCALATION      Physician Escalation (Yes/No) no     Discussed plan of care primary RT, Carin Regalado CRT     FOLLOW-UP     Please call back the Rapid Response RT, Leeann Ly, RRT at x [phone] for any questions or concerns.

## 2020-03-13 NOTE — SUBJECTIVE & OBJECTIVE
Interval History: NAEO. Patient on and off BiPAP overnight and PCO2 worsened this morning.  Discussed necessity to remaining on BiPAP and increased BiPAP settings to 20/5.     Review of Systems   Constitutional: Positive for fatigue. Negative for chills, diaphoresis and fever.   HENT: Negative for congestion.    Eyes: Negative for visual disturbance.   Respiratory: Positive for cough ( Nonproductive), shortness of breath ( Exertional and at rest) and wheezing. Negative for apnea, choking and chest tightness.    Cardiovascular: Negative for chest pain, palpitations and leg swelling.   Gastrointestinal: Negative for abdominal distention, abdominal pain, constipation, diarrhea and nausea.   Genitourinary: Negative for dysuria.   Musculoskeletal: Negative for back pain and myalgias.   Skin: Negative for rash.   Allergic/Immunologic: Negative for immunocompromised state.   Neurological: Positive for headaches. Negative for weakness.   Psychiatric/Behavioral: Negative for agitation.     Objective:     Vital Signs (Most Recent):  Temp: 98.1 °F (36.7 °C) (03/13/20 0805)  Pulse: 98 (03/13/20 1500)  Resp: (!) 30 (03/13/20 1500)  BP: 127/67 (03/13/20 1127)  SpO2: (!) 91 % (03/13/20 1500) Vital Signs (24h Range):  Temp:  [97.7 °F (36.5 °C)-98.1 °F (36.7 °C)] 98.1 °F (36.7 °C)  Pulse:  [] 98  Resp:  [16-30] 30  SpO2:  [85 %-96 %] 91 %  BP: (117-153)/(56-95) 127/67     Weight: 111.7 kg (246 lb 2.3 oz)  Body mass index is 36.35 kg/m².  No intake or output data in the 24 hours ending 03/13/20 1503   Physical Exam   Constitutional: He is oriented to person, place, and time. He appears well-developed and well-nourished. No distress.   Obease   HENT:   Head: Normocephalic and atraumatic.   Mouth/Throat: No oropharyngeal exudate.   Eyes: Pupils are equal, round, and reactive to light. EOM are normal. No scleral icterus.   Neck: Normal range of motion. Neck supple.   Cardiovascular: Normal rate, regular rhythm and normal heart  sounds.   No murmur heard.  Pulmonary/Chest: Effort normal. No stridor. No respiratory distress. He has wheezes. He has no rales. He exhibits no tenderness.   3L NC  Speaking in complete sentences   Abdominal: Soft. Bowel sounds are normal. He exhibits no distension and no mass. There is no tenderness.   Musculoskeletal: Normal range of motion. He exhibits no edema.   Neurological: He is alert and oriented to person, place, and time.   Skin: Skin is warm. No rash noted. He is not diaphoretic.   Psychiatric: He has a normal mood and affect.       Significant Labs: All pertinent labs within the past 24 hours have been reviewed.    Significant Imaging: I have reviewed all pertinent imaging results/findings within the past 24 hours.

## 2020-03-13 NOTE — CARE UPDATE
Rapid Response Respiratory Therapy Proactive Rounding Note      Time of visit: ***     Code Status: Full Code   : 1965  Bed: 650/650 A:   MRN: [id number]    SITUATION     Evaluated patient for: Non-Invasive Positive Pressure Ventilation Compliance     BACKGROUND     Patient has a past medical history of COPD (chronic obstructive pulmonary disease) and Hypertension.    ASSESSMENT/INTERVENTIONS     Upon arrival in room ***    Pulse: *** Respiratory rate: *** SpO2: ***   Level of Consciousness: Level of Consciousness (AVPU): alert  Respiratory Effort: Respiratory Effort: Unlabored Expansion/Accessory Muscle Usage: Expansion/Accessory Muscles/Retractions: no use of accessory muscles  All Lung Field Breath Sounds: All Lung Fields Breath Sounds: diminished  DOROTHY Breath Sounds: diminished  LLL Breath Sounds: diminished  RUL Breath Sounds: diminished, clear  RML Breath Sounds: diminished, clear  RLL Breath Sounds: diminished, clear  O2 Device/Concentration: ***  Most recent blood gas:   Recent Labs     20  0947   PH 7.219*   PCO2 92.8*   PO2 43   HCO3 37.9*   POCSATURATED 66*   BE 10     NIPPV: {RRS RT NIPPV:62779}   Ambu at bedside: Ambu bag with the patient?: Yes, Adult Ambu    Current Respiratory Care Orders: ***    RECOMMENDATIONS     We recommend: ***    ESCALATION      Physician Escalation (Yes/No) ***   Care discussed with: ***  Discussed plan of care primary RT, ***     FOLLOW-UP     Please call back the Rapid Response RT, Leeann Ly, RRT at x [phone] for any questions or concerns.

## 2020-03-13 NOTE — ASSESSMENT & PLAN NOTE
54M admitted with acute COPD exacerbation associated with hypoxic and hypercapnic respiratory failure    Plan:  - Respiratory infection panel, blood/sputum cultures pending  - Levaquin   - Duonebs Q4  - Fluticasone-vilanterol inhailer qd  - Tiotropium qd  - Continuous BiPAP 20/5  - Methylprednisolone 125 mg x1 in ED   - PO 40 mg prednisone QD  - Lovenox DVT prophylaxix     - Daily CBC, CMP, Mg, and Phos  - Monitor respiratory status closely

## 2020-03-13 NOTE — CARE UPDATE
Pt placed back on bipap after collection of capillary blood; Dr Guzmán notified of results; pt merissa well

## 2020-03-13 NOTE — ASSESSMENT & PLAN NOTE
- Goal saturation 88-92% given COPD  - BiPAP 20/5 30% O2 Continuous   - VBG PRN    Latest VBG:  Recent Labs     03/13/20  0947   PH 7.219*   PCO2 92.8*   PO2 43   HCO3 37.9*   POCSATURATED 66*   BE 10

## 2020-03-13 NOTE — CARE UPDATE
Rapid Response Respiratory Follow Up Therapy Note     Followed up with patient for proactive rounding.   No acute issues at this time. Plan of care reviewed with primary RT, Carin Regalado CRT.  Please call Rapid Response RT, Leeann Ly, TAO at [phone] with any questions or concerns.

## 2020-03-13 NOTE — NURSING
Pt took himself off of bipap and put on his clothes, fully dressed. Pt stated he was going to visit his friend in room 1108. No c/o SOB, pleasant, appears anxious and shaky. RT at bedside, VBG taken, critical CO2 noted. Instructed pt to get back in bed and allow us to place bipap on but pt refuses. Spoke with Dr. Dior who wants to be notified when pt is back in room.

## 2020-03-13 NOTE — PROGRESS NOTES
[facility] Hospital Medicine  Progress Note    Patient Name: Kt Lutz  MRN: [id number]  Patient Class: IP- Inpatient   Admission Date: 3/12/2020  Length of Stay: 1 days  Attending Physician: Shashi Berger MD  Primary Care Provider: Primary Doctor No    [facility] Medicine Team: [facility] HOSP MED 2 Kwan Pool MD    Subjective:     Principal Problem:COPD with acute exacerbation        HPI:  Mr. Lutz is a 54 year-old male with a past medical history of tobacco abuse (current active smoker), COPD (recently diagnosed on hospitalization 3 weeks ago), alcohol/drug abuse (in remission) who presents to emergency department of [facility] on 03/12/2020 for with shortness of breath, malaise and dry cough for 2 weeks.  Since his symptoms began, they have progressively worsened.  At onset, the patient noted minimal fatigue and dyspnea on exertion which did not overtly affect his daily routine.  Currently, the patient reports dyspnea at rest which markedly worsens with minimal exertion.  He also endorses progressively worsening fatigue and nonproductive cough during the same time. He is a current smoker with >40 pack-year smoking history.  Normally the patient smokes 1/2 packs of cigarettes per day however since the onset of his symptoms he has cut down to only 4 cigarettes daily.  He denies fevers, chills, aches, just abdominal pain, nausea, vomiting, diarrhea, constipation, abdominal pain, dysuria.  He does endorse occasional headaches which occur during the evening time most days.  He reports his last alcoholic beverage was approximately 6 months ago and has been living in a long term house for his alcohol and drug abuse problems.  He denies utilizing IV drugs but has struggled with narcotic, meth, marijuana, and cocaine in the past.       Overview/Hospital Course:  Admitted to Hospital Medicine Team 2 on 03/12/2020 for COPD exacerbation.  On admission, oxygen saturation in low 80s and ABG  with pCO2 in low 70s and patient initiated on BiPAP, Q4 nebs, vanc/zosyn, methylprednisolone, Breo and Spiriva. On and off BiPAP overnight; pCO2 low 90s the next morning. Discussed need to remain on BiPAP today and BiPAP settings increased to 20/5.    Interval History: NAEO. Patient on and off BiPAP overnight and PCO2 worsened this morning.  Discussed necessity to remaining on BiPAP and increased BiPAP settings to 20/5.     Review of Systems   Constitutional: Positive for fatigue. Negative for chills, diaphoresis and fever.   HENT: Negative for congestion.    Eyes: Negative for visual disturbance.   Respiratory: Positive for cough ( Nonproductive), shortness of breath ( Exertional and at rest) and wheezing. Negative for apnea, choking and chest tightness.    Cardiovascular: Negative for chest pain, palpitations and leg swelling.   Gastrointestinal: Negative for abdominal distention, abdominal pain, constipation, diarrhea and nausea.   Genitourinary: Negative for dysuria.   Musculoskeletal: Negative for back pain and myalgias.   Skin: Negative for rash.   Allergic/Immunologic: Negative for immunocompromised state.   Neurological: Positive for headaches. Negative for weakness.   Psychiatric/Behavioral: Negative for agitation.     Objective:     Vital Signs (Most Recent):  Temp: 98.1 °F (36.7 °C) (03/13/20 0805)  Pulse: 98 (03/13/20 1500)  Resp: (!) 30 (03/13/20 1500)  BP: 127/67 (03/13/20 1127)  SpO2: (!) 91 % (03/13/20 1500) Vital Signs (24h Range):  Temp:  [97.7 °F (36.5 °C)-98.1 °F (36.7 °C)] 98.1 °F (36.7 °C)  Pulse:  [] 98  Resp:  [16-30] 30  SpO2:  [85 %-96 %] 91 %  BP: (117-153)/(56-95) 127/67     Weight: 111.7 kg (246 lb 2.3 oz)  Body mass index is 36.35 kg/m².  No intake or output data in the 24 hours ending 03/13/20 1503   Physical Exam   Constitutional: He is oriented to person, place, and time. He appears well-developed and well-nourished. No distress.   Obease   HENT:   Head: Normocephalic and  atraumatic.   Mouth/Throat: No oropharyngeal exudate.   Eyes: Pupils are equal, round, and reactive to light. EOM are normal. No scleral icterus.   Neck: Normal range of motion. Neck supple.   Cardiovascular: Normal rate, regular rhythm and normal heart sounds.   No murmur heard.  Pulmonary/Chest: Effort normal. No stridor. No respiratory distress. He has wheezes. He has no rales. He exhibits no tenderness.   3L NC  Speaking in complete sentences   Abdominal: Soft. Bowel sounds are normal. He exhibits no distension and no mass. There is no tenderness.   Musculoskeletal: Normal range of motion. He exhibits no edema.   Neurological: He is alert and oriented to person, place, and time.   Skin: Skin is warm. No rash noted. He is not diaphoretic.   Psychiatric: He has a normal mood and affect.       Significant Labs: All pertinent labs within the past 24 hours have been reviewed.    Significant Imaging: I have reviewed all pertinent imaging results/findings within the past 24 hours.      Assessment/Plan:      * COPD with acute exacerbation  54M admitted with acute COPD exacerbation associated with hypoxic and hypercapnic respiratory failure    Plan:  - Respiratory infection panel, blood/sputum cultures pending  - Levaquin   - Duonebs Q4  - Fluticasone-vilanterol inhailer qd  - Tiotropium qd  - Continuous BiPAP 20/5  - Methylprednisolone 125 mg x1 in ED   - PO 40 mg prednisone QD  - Lovenox DVT prophylaxix     - Daily CBC, CMP, Mg, and Phos  - Monitor respiratory status closely        Acute respiratory failure with hypoxia and hypercapnia  - Goal saturation 88-92% given COPD  - BiPAP 20/5 30% O2 Continuous   - VBG PRN    Latest VBG:  Recent Labs     03/13/20  0947   PH 7.219*   PCO2 92.8*   PO2 43   HCO3 37.9*   POCSATURATED 66*   BE 10           Essential hypertension  - Chronic, controlled.    - Home meds for hypertension were reviewed and noted below.   - Hospital anti-hypertensive changes were made as shown  below.  Hypertension Medications             lisinopril (PRINIVIL,ZESTRIL) 20 MG tablet Take 1 tablet (20 mg total) by mouth once daily.      Hospital Medications             lisinopriL tablet 20 mg 20 mg, Oral, Daily        Will utilize p.r.n. blood pressure medication only if patient's blood pressure greater than  180/110 and he develops symptoms such as worsening chest pain or shortness of breath.        Tobacco abuse  - Counseled on smoking cessation  - Nicotine patch p.r.n.        VTE Risk Mitigation (From admission, onward)         Ordered     enoxaparin injection 40 mg  Daily      03/12/20 1249     IP VTE HIGH RISK PATIENT  Once      03/12/20 1249                      Kwan Pool MD  Department of Hospital Medicine   [facility]

## 2020-03-13 NOTE — NURSING
Patient admitted to Room 650.  At 2150 VVS.  Oxygen SAT 71 off of BiPAP.  BiPAP on oxygen SAT 91.  Patient refuses to keep BiPAP on. At 2215 nursing assessment.  Face - flushed.  No complaints of chest pain or shortness of breath.  No edema to lower extremities.   Ambulated to the bathroom - voided without any problems and had a bowel movement.  At 2244 talked with [facility] 2 on call doctor, told him about patient noncompliance. At 0100 I talked with Dr. Benedict again about patient noncompliance.  At 0135 he came to the floor and talked with the patient for over 30 minutes explaining the importance of keeping the BiPAP and/or alternating it with nasal cannula.  He also told the patient how serious his COPD is and we are trying to take care of him..Patient contiuned to remove BiPAP or nasal cannula throughout the night.  Re-enforced to the patient of keeping both on.  At 0330 started Vancomycin to infuse over 4 hours.  IV tubing softly covered with coban wrap and I explained to the patient the coban was to help him to remember not to touch the IV; however, at   0530 patient dislodge 20 g needle to his left hand.  In addition, patient removed his BiPAP and nasal cannula and was seen down the ochoa by the elevator.  Once again re-enforced to the patient the importance of keeping the BiPAP on or oxygen because when they are on his oxygen SAT is 91 but when he removes either it oxygen SAT drops to 71. Also patient continued to remove the telemetry box several times throughout the night.  Charge nurse and Dr. LOI Benedict notified.  Patient remained noncompliant throughout the night shift.  Respiratory Therapy and I was in his room numerous times re-enforcing the importance of complying with the doctor's orders.

## 2020-03-13 NOTE — PLAN OF CARE
CM spoke with patient at bedside. CM explained the role of the CM/SW in assisting with discharge planning. Information in medical records verified with patient. Patient lives at the [facility], no  home DME utilized. Patient anticipates being discharged back to the group home  once medically stable. Patient needs PCP, CM to set up appointment. CM name and number on board.         Payor: MEDICAID / Plan: [facility] ([place] MEDICAID) / Product Type: Managed Medicaid /         03/13/20 1530   Discharge Assessment   Assessment Type Discharge Planning Assessment   Confirmed/corrected address and phone number on facesheet? Yes   Assessment information obtained from? Patient;Medical Record   Expected Length of Stay (days) 3   Communicated expected length of stay with patient/caregiver yes   Prior to hospitilization cognitive status: Alert/Oriented;No Deficits   Prior to hospitalization functional status: Independent   Current cognitive status: Alert/Oriented;No Deficits   Current Functional Status: Independent   Facility Arrived From: [facility]   Lives With other (see comments)  ([facility] )   Able to Return to Prior Arrangements yes   Is patient able to care for self after discharge? Yes   Patient's perception of discharge disposition group [place]   Patient currently being followed by outpatient case management? No   Patient currently receives any other outside agency services? No   Equipment Currently Used at Home none   Do you have any problems affording any of your prescribed medications? No   Is the patient taking medications as prescribed? yes   Does the patient have transportation home? Yes   Transportation Anticipated public transportation   Does the patient receive services at the [facility]? No   Discharge Plan A Group Home   Discharge Plan B Group home   DME Needed Upon Discharge  none   Patient/Family in Agreement with Plan yes       Rain Burrows RN, BSN  Case  Manager   Ext [phone]

## 2020-03-14 LAB
ALBUMIN SERPL BCP-MCNC: 2.9 G/DL (ref 3.5–5.2)
ALLENS TEST: ABNORMAL
ALLENS TEST: ABNORMAL
ALP SERPL-CCNC: 66 U/L (ref 55–135)
ALT SERPL W/O P-5'-P-CCNC: 33 U/L (ref 10–44)
ANION GAP SERPL CALC-SCNC: 8 MMOL/L (ref 8–16)
AST SERPL-CCNC: 17 U/L (ref 10–40)
BACTERIA SPEC AEROBE CULT: NORMAL
BACTERIA SPEC AEROBE CULT: NORMAL
BASOPHILS # BLD AUTO: 0.04 K/UL (ref 0–0.2)
BASOPHILS NFR BLD: 0.4 % (ref 0–1.9)
BILIRUB SERPL-MCNC: 0.1 MG/DL (ref 0.1–1)
BNP SERPL-MCNC: 15 PG/ML (ref 0–99)
BUN SERPL-MCNC: 14 MG/DL (ref 6–20)
CALCIUM SERPL-MCNC: 7.9 MG/DL (ref 8.7–10.5)
CHLORIDE SERPL-SCNC: 103 MMOL/L (ref 95–110)
CO2 SERPL-SCNC: 30 MMOL/L (ref 23–29)
CREAT SERPL-MCNC: 0.7 MG/DL (ref 0.5–1.4)
CRP SERPL-MCNC: 9.2 MG/L (ref 0–8.2)
DELSYS: ABNORMAL
DELSYS: ABNORMAL
DIFFERENTIAL METHOD: ABNORMAL
EOSINOPHIL # BLD AUTO: 0.1 K/UL (ref 0–0.5)
EOSINOPHIL NFR BLD: 0.4 % (ref 0–8)
EP: 5
EP: 5
ERYTHROCYTE [DISTWIDTH] IN BLOOD BY AUTOMATED COUNT: 14.2 % (ref 11.5–14.5)
ERYTHROCYTE [SEDIMENTATION RATE] IN BLOOD BY WESTERGREN METHOD: 12 MM/H
ERYTHROCYTE [SEDIMENTATION RATE] IN BLOOD BY WESTERGREN METHOD: 12 MM/H
EST. GFR  (AFRICAN AMERICAN): >60 ML/MIN/1.73 M^2
EST. GFR  (NON AFRICAN AMERICAN): >60 ML/MIN/1.73 M^2
FIO2: 30
FIO2: 30
GLUCOSE SERPL-MCNC: 93 MG/DL (ref 70–110)
GRAM STN SPEC: NORMAL
HCO3 UR-SCNC: 39.2 MMOL/L (ref 24–28)
HCO3 UR-SCNC: 40 MMOL/L (ref 24–28)
HCT VFR BLD AUTO: 47.1 % (ref 40–54)
HGB BLD-MCNC: 13.3 G/DL (ref 14–18)
IMM GRANULOCYTES # BLD AUTO: 0.06 K/UL (ref 0–0.04)
IMM GRANULOCYTES NFR BLD AUTO: 0.5 % (ref 0–0.5)
IP: 20
IP: 20
LYMPHOCYTES # BLD AUTO: 1.9 K/UL (ref 1–4.8)
LYMPHOCYTES NFR BLD: 16.8 % (ref 18–48)
MAGNESIUM SERPL-MCNC: 2.3 MG/DL (ref 1.6–2.6)
MCH RBC QN AUTO: 28.7 PG (ref 27–31)
MCHC RBC AUTO-ENTMCNC: 28.2 G/DL (ref 32–36)
MCV RBC AUTO: 102 FL (ref 82–98)
MODE: ABNORMAL
MODE: ABNORMAL
MONOCYTES # BLD AUTO: 1 K/UL (ref 0.3–1)
MONOCYTES NFR BLD: 9 % (ref 4–15)
NEUTROPHILS # BLD AUTO: 8.1 K/UL (ref 1.8–7.7)
NEUTROPHILS NFR BLD: 72.9 % (ref 38–73)
NRBC BLD-RTO: 0 /100 WBC
PCO2 BLDA: 81.5 MMHG (ref 35–45)
PCO2 BLDA: 83.9 MMHG (ref 35–45)
PH SMN: 7.29 [PH] (ref 7.35–7.45)
PH SMN: 7.29 [PH] (ref 7.35–7.45)
PHOSPHATE SERPL-MCNC: 3 MG/DL (ref 2.7–4.5)
PLATELET # BLD AUTO: 304 K/UL (ref 150–350)
PMV BLD AUTO: 9.7 FL (ref 9.2–12.9)
PO2 BLDA: 30 MMHG (ref 40–60)
PO2 BLDA: 34 MMHG (ref 40–60)
POC BE: 13 MMOL/L
POC BE: 13 MMOL/L
POC SATURATED O2: 47 % (ref 95–100)
POC SATURATED O2: 54 % (ref 95–100)
POC TCO2: 42 MMOL/L (ref 24–29)
POC TCO2: 43 MMOL/L (ref 24–29)
POTASSIUM SERPL-SCNC: 4.3 MMOL/L (ref 3.5–5.1)
PROCALCITONIN SERPL IA-MCNC: <0.02 NG/ML
PROT SERPL-MCNC: 6 G/DL (ref 6–8.4)
RBC # BLD AUTO: 4.63 M/UL (ref 4.6–6.2)
SAMPLE: ABNORMAL
SAMPLE: ABNORMAL
SITE: ABNORMAL
SITE: ABNORMAL
SODIUM SERPL-SCNC: 141 MMOL/L (ref 136–145)
WBC # BLD AUTO: 11.15 K/UL (ref 3.9–12.7)

## 2020-03-14 PROCEDURE — 99291 CRITICAL CARE FIRST HOUR: CPT | Mod: ,,, | Performed by: INTERNAL MEDICINE

## 2020-03-14 PROCEDURE — 84100 ASSAY OF PHOSPHORUS: CPT

## 2020-03-14 PROCEDURE — 27000221 HC OXYGEN, UP TO 24 HOURS

## 2020-03-14 PROCEDURE — U0002 COVID-19 LAB TEST NON-CDC: HCPCS

## 2020-03-14 PROCEDURE — 20000000 HC ICU ROOM

## 2020-03-14 PROCEDURE — 99900035 HC TECH TIME PER 15 MIN (STAT)

## 2020-03-14 PROCEDURE — 63600175 PHARM REV CODE 636 W HCPCS: Performed by: STUDENT IN AN ORGANIZED HEALTH CARE EDUCATION/TRAINING PROGRAM

## 2020-03-14 PROCEDURE — 94640 AIRWAY INHALATION TREATMENT: CPT

## 2020-03-14 PROCEDURE — 25000003 PHARM REV CODE 250: Performed by: HOSPITALIST

## 2020-03-14 PROCEDURE — 99233 PR SUBSEQUENT HOSPITAL CARE,LEVL III: ICD-10-PCS | Mod: ,,, | Performed by: HOSPITALIST

## 2020-03-14 PROCEDURE — 83880 ASSAY OF NATRIURETIC PEPTIDE: CPT

## 2020-03-14 PROCEDURE — S4991 NICOTINE PATCH NONLEGEND: HCPCS | Performed by: STUDENT IN AN ORGANIZED HEALTH CARE EDUCATION/TRAINING PROGRAM

## 2020-03-14 PROCEDURE — 83735 ASSAY OF MAGNESIUM: CPT

## 2020-03-14 PROCEDURE — 85025 COMPLETE CBC W/AUTO DIFF WBC: CPT

## 2020-03-14 PROCEDURE — 25000242 PHARM REV CODE 250 ALT 637 W/ HCPCS: Performed by: STUDENT IN AN ORGANIZED HEALTH CARE EDUCATION/TRAINING PROGRAM

## 2020-03-14 PROCEDURE — 63600175 PHARM REV CODE 636 W HCPCS: Performed by: HOSPITALIST

## 2020-03-14 PROCEDURE — 84145 PROCALCITONIN (PCT): CPT

## 2020-03-14 PROCEDURE — 94761 N-INVAS EAR/PLS OXIMETRY MLT: CPT

## 2020-03-14 PROCEDURE — 87633 RESP VIRUS 12-25 TARGETS: CPT

## 2020-03-14 PROCEDURE — 86140 C-REACTIVE PROTEIN: CPT

## 2020-03-14 PROCEDURE — 80053 COMPREHEN METABOLIC PANEL: CPT

## 2020-03-14 PROCEDURE — 36415 COLL VENOUS BLD VENIPUNCTURE: CPT

## 2020-03-14 PROCEDURE — 25000003 PHARM REV CODE 250: Performed by: STUDENT IN AN ORGANIZED HEALTH CARE EDUCATION/TRAINING PROGRAM

## 2020-03-14 PROCEDURE — 99233 SBSQ HOSP IP/OBS HIGH 50: CPT | Mod: ,,, | Performed by: HOSPITALIST

## 2020-03-14 PROCEDURE — 99291 PR CRITICAL CARE, E/M 30-74 MINUTES: ICD-10-PCS | Mod: ,,, | Performed by: INTERNAL MEDICINE

## 2020-03-14 PROCEDURE — 94660 CPAP INITIATION&MGMT: CPT

## 2020-03-14 RX ORDER — IBUPROFEN 200 MG
1 TABLET ORAL DAILY
Status: DISCONTINUED | OUTPATIENT
Start: 2020-03-14 | End: 2020-03-24 | Stop reason: HOSPADM

## 2020-03-14 RX ADMIN — IPRATROPIUM BROMIDE AND ALBUTEROL SULFATE 3 ML: .5; 3 SOLUTION RESPIRATORY (INHALATION) at 12:03

## 2020-03-14 RX ADMIN — IPRATROPIUM BROMIDE AND ALBUTEROL SULFATE 3 ML: .5; 3 SOLUTION RESPIRATORY (INHALATION) at 03:03

## 2020-03-14 RX ADMIN — LEVOFLOXACIN 750 MG: 750 TABLET, FILM COATED ORAL at 09:03

## 2020-03-14 RX ADMIN — ENOXAPARIN SODIUM 40 MG: 100 INJECTION SUBCUTANEOUS at 05:03

## 2020-03-14 RX ADMIN — NICOTINE 1 PATCH: 14 PATCH TRANSDERMAL at 04:03

## 2020-03-14 RX ADMIN — PREDNISONE 40 MG: 10 TABLET ORAL at 09:03

## 2020-03-14 RX ADMIN — TIOTROPIUM BROMIDE 18 MCG: 18 CAPSULE ORAL; RESPIRATORY (INHALATION) at 09:03

## 2020-03-14 RX ADMIN — IPRATROPIUM BROMIDE AND ALBUTEROL SULFATE 3 ML: .5; 3 SOLUTION RESPIRATORY (INHALATION) at 08:03

## 2020-03-14 RX ADMIN — IPRATROPIUM BROMIDE AND ALBUTEROL SULFATE 3 ML: .5; 3 SOLUTION RESPIRATORY (INHALATION) at 04:03

## 2020-03-14 RX ADMIN — LISINOPRIL 20 MG: 20 TABLET ORAL at 09:03

## 2020-03-14 RX ADMIN — FLUTICASONE FUROATE AND VILANTEROL TRIFENATATE 1 PUFF: 100; 25 POWDER RESPIRATORY (INHALATION) at 09:03

## 2020-03-14 NOTE — RESPIRATORY THERAPY
Rapid Response Respiratory Therapy Proactive Rounding Note      Time of visit: 1300    Code Status: Full Code   : 1965  Bed: 650/650 A:   MRN: [id number]    SITUATION     Evaluated patient for: Non-Invasive Positive Pressure Ventilation Compliance     BACKGROUND     Patient has a past medical history of COPD (chronic obstructive pulmonary disease) and Hypertension.    ASSESSMENT/INTERVENTIONS     Upon arrival in room pt currently wearing BIPAP. No SOB or distress noted.     Pulse: 70 Respiratory rate: 22 SpO2: 96   Level of Consciousness: Level of Consciousness (AVPU): alert  Respiratory Effort: Respiratory Effort: Moderate Expansion/Accessory Muscle Usage: Expansion/Accessory Muscles/Retractions: no use of accessory muscles, no retractions  All Lung Field Breath Sounds: All Lung Fields Breath Sounds: diminished  DOROTHY Breath Sounds: diminished  LLL Breath Sounds: diminished  RUL Breath Sounds: diminished  RML Breath Sounds: diminished  RLL Breath Sounds: diminished  O2 Device/Concentration: BIPAP/30  Most recent blood gas:   Recent Labs     20  0504   PH 7.290*   PCO2 81.5*   PO2 30*   HCO3 39.2*   POCSATURATED 47*   BE 13     NIPPV: Yes, Type: BIPAP Settings: 20/5 30%   Ambu at bedside: Ambu bag with the patient?: Yes, Adult Ambu    Current Respiratory Care Orders:   20 2017  Bipap Continuous Continuous (7 of 37 released)    Release   Comments: Adjust to maintain O2 saturation 88%-92%   Question Answer Comment   FiO2% 30    Inspiratory pressure: 20    Expiratory pressure: 5        20 1802   20 1900  Incentive spirometry Every 6 hours (9 of 25 released)    Release    20 1601   20 1600  Pulse Oximetry Q4H Every 4 hours (15 of 38 released)    Release    20 1249   20 1600  Inhalation Treatment Q4H Every 4 hours (15 of 38 released)    Release    20 1251   Unscheduled  POCT Venous Blood Gas PRN Use PRN (0 of 3 released)    Release   Comments: This test  should be used for VBGs.  If using this order for other tests (K, creatinine, HCT, PT/INR, lactate etc)  ONLY do so in the case of an emergency or rapid response.   Notify Physician if: see parameters below.   Question: Component: Answer: Blood Gas           03/12/20 1600  albuterol-ipratropium 2.5 mg-0.5 mg/3 mL nebulizer solution 3 mL (albuterol-ipratropium (DUO-NEB) 0.5 - 3 mg (2.5 mg base)/ 3 mL nebulizer panel)    Discontinue    -- Dispensed NEBULIZATION Every 4 hours 03/12/20 1251   03/12/20 1345  fluticasone furoate-vilanteroL 100-25 mcg/dose diskus inhaler 1 puff    Discontinue    -- Dispensed Inhl Daily 03/12/20 1249   03/12/20 1345  tiotropium inhalation capsule 18 mcg    Discontinue    -- Dispensed Inhl Daily 03/12/20 1249   03/12/20 1341  albuterol inhaler 2 puff    Discontinue    -- Dispensed Inhl Every 6 hours PRN 03/12/20 1249       RECOMMENDATIONS     We recommend: Continue current POC and call with any acute issues.     ESCALATION      Physician Escalation (Yes/No) No   Care discussed with: N/A  Discussed plan of care primary RT, Sebastian     FOLLOW-UP     Please call back the Rapid Response RT, Yamileth Oakley, TONI at x [phone] for any questions or concerns.

## 2020-03-14 NOTE — PROGRESS NOTES
[facility] Hospital Medicine  Progress Note    Patient Name: Kt Lutz  MRN: [id number]  Patient Class: IP- Inpatient   Admission Date: 3/12/2020  Length of Stay: 2 days  Attending Physician: Shashi Berger MD  Primary Care Provider: Primary Doctor No    [facility] Medicine Team: [facility] HOSP MED 2 Kwan Pool MD    Subjective:     Principal Problem:COPD with acute exacerbation        HPI:  Mr. Lutz is a 54 year-old male with a past medical history of tobacco abuse (current active smoker), COPD (recently diagnosed on hospitalization 3 weeks ago), alcohol/drug abuse (in remission) who presents to emergency department of [facility] on 03/12/2020 for with shortness of breath, malaise and dry cough for 2 weeks.  Since his symptoms began, they have progressively worsened.  At onset, the patient noted minimal fatigue and dyspnea on exertion which did not overtly affect his daily routine.  Currently, the patient reports dyspnea at rest which markedly worsens with minimal exertion.  He also endorses progressively worsening fatigue and nonproductive cough during the same time. He is a current smoker with >40 pack-year smoking history.  Normally the patient smokes 1/2 packs of cigarettes per day however since the onset of his symptoms he has cut down to only 4 cigarettes daily.  He denies fevers, chills, aches, just abdominal pain, nausea, vomiting, diarrhea, constipation, abdominal pain, dysuria.  He does endorse occasional headaches which occur during the evening time most days.  He reports his last alcoholic beverage was approximately 6 months ago and has been living in a correction house for his alcohol and drug abuse problems.  He denies utilizing IV drugs but has struggled with narcotic, meth, marijuana, and cocaine in the past.       Overview/Hospital Course:  Admitted to Hospital Medicine Team 2 on 03/12/2020 for COPD exacerbation.  On admission, oxygen saturation in low 80s and ABG  with pCO2 in low 70s and patient initiated on BiPAP, Q4 nebs, vanc/zosyn, methylprednisolone, Breo and Spiriva. On and off BiPAP overnight; pCO2 low 90s the next morning. Discussed need to remain on BiPAP today and BiPAP settings increased to 20/5.  Despite orders for continuous BiPAP, patient intermittently removing BiPAP.  PCO2 remains elevated.  Importance of continuous BiPAP thoroughly discussed with patient at bedside on rounds.  Pulmonology consult given patient's poor response to BiPAP over the past 36 hr.    Interval History: NAEO. Despite orders for continuous BiPAP, patient intermittently removing BiPAP.  PCO2 remains elevated.  Importance of continuous BiPAP thoroughly discussed with patient at bedside on rounds.  Pulmonology consult given patient's poor response to BiPAP over the past 36 hr.     Review of Systems   Constitutional: Positive for fatigue. Negative for chills, diaphoresis and fever.   HENT: Negative for congestion.    Eyes: Negative for visual disturbance.   Respiratory: Positive for cough ( Nonproductive), shortness of breath ( Exertional and at rest) and wheezing. Negative for apnea, choking and chest tightness.    Cardiovascular: Negative for chest pain, palpitations and leg swelling.   Gastrointestinal: Negative for abdominal distention, abdominal pain, constipation, diarrhea and nausea.   Genitourinary: Negative for dysuria.   Musculoskeletal: Negative for back pain and myalgias.   Skin: Negative for rash.   Allergic/Immunologic: Negative for immunocompromised state.   Neurological: Positive for headaches. Negative for weakness.   Psychiatric/Behavioral: Negative for agitation.     Objective:     Vital Signs (Most Recent):  Temp: 98 °F (36.7 °C) (03/14/20 0804)  Pulse: 70 (03/14/20 1300)  Resp: (!) 22 (03/14/20 1300)  BP: 129/72 (03/14/20 1128)  SpO2: 96 % (03/14/20 1300) Vital Signs (24h Range):  Temp:  [98 °F (36.7 °C)-98.4 °F (36.9 °C)] 98 °F (36.7 °C)  Pulse:  [70-98] 70  Resp:   [16-30] 22  SpO2:  [91 %-96 %] 96 %  BP: (109-142)/(64-86) 129/72     Weight: 111.7 kg (246 lb 2.3 oz)  Body mass index is 36.35 kg/m².    Intake/Output Summary (Last 24 hours) at 3/14/2020 1355  Last data filed at 3/14/2020 0700  Gross per 24 hour   Intake 475 ml   Output --   Net 475 ml      Physical Exam   Constitutional: He is oriented to person, place, and time. He appears well-developed and well-nourished. No distress.   [place]   HENT:   Head: Normocephalic and atraumatic.   Mouth/Throat: No oropharyngeal exudate.   Eyes: Pupils are equal, round, and reactive to light. EOM are normal. No scleral icterus.   Neck: Normal range of motion. Neck supple.   Cardiovascular: Normal rate, regular rhythm and normal heart sounds.   No murmur heard.  Pulmonary/Chest: Effort normal. No stridor. No respiratory distress. He has wheezes. He has no rales. He exhibits no tenderness.   BiPAP  Speaking in complete sentences   Abdominal: Soft. Bowel sounds are normal. He exhibits no distension and no mass. There is no tenderness.   Musculoskeletal: Normal range of motion. He exhibits no edema.   Neurological: He is alert and oriented to person, place, and time.   Skin: Skin is warm. No rash noted. He is not diaphoretic.   Psychiatric: He has a normal mood and affect.       Significant Labs: All pertinent labs within the past 24 hours have been reviewed.    Significant Imaging: I have reviewed all pertinent imaging results/findings within the past 24 hours.      Assessment/Plan:      * COPD with acute exacerbation  54M admitted with acute COPD exacerbation associated with hypoxic and hypercapnic respiratory failure    Plan:  - Respiratory infection panel, blood/sputum cultures pending  - Levaquin   - Duonebs Q4  - Fluticasone-vilanterol inhailer qd  - Tiotropium qd  - Continuous BiPAP 20/5  - Methylprednisolone 125 mg x1 in ED   - PO 40 mg prednisone QD  - Lovenox DVT prophylaxix  - Daily CBC, CMP, Mg, and Phos  - Monitor  respiratory status closely        Acute respiratory failure with hypoxia and hypercapnia  - Goal saturation 88-92% given COPD  - BiPAP 20/5 30% O2 Continuous   - VBG PRN    Latest VBG:  Recent Labs     03/14/20  0504   PH 7.290*   PCO2 81.5*   PO2 30*   HCO3 39.2*   POCSATURATED 47*   BE 13           Essential hypertension  - Chronic, controlled.    - Home meds for hypertension were reviewed and noted below.   - Hospital anti-hypertensive changes were made as shown below.  Hypertension Medications             lisinopril (PRINIVIL,ZESTRIL) 20 MG tablet Take 1 tablet (20 mg total) by mouth once daily.      Hospital Medications             lisinopriL tablet 20 mg 20 mg, Oral, Daily        Will utilize p.r.n. blood pressure medication only if patient's blood pressure greater than  180/110 and he develops symptoms such as worsening chest pain or shortness of breath.        Tobacco abuse  - Counseled on smoking cessation  - Nicotine patch        VTE Risk Mitigation (From admission, onward)         Ordered     enoxaparin injection 40 mg  Daily      03/12/20 1249     IP VTE HIGH RISK PATIENT  Once      03/12/20 1249                      Kwan Pool MD  Department of Hospital Medicine   [facility]

## 2020-03-14 NOTE — PLAN OF CARE
CMICU DAILY GOALS       A: Awake    RASS: Goal -  alert and calm  Actual -  alert and calm   Restraint necessity:  not needed  B: Breath   SBT: Not intubated   C: Coordinate A & B, analgesics/sedatives   Pain: managed    SAT: Not intubated  D: Delirium   CAM-ICU: Overall CAM-ICU: Negative  E: Early Mobility   MOVE Screen: Fail   Activity: Activity Management: activity clustered for rest period  FAS: Feeding/Nutrition   Diet order:  ,   Fluid restriction:    T: Thrombus   DVT prophylaxis: VTE Required Core Measure: Pharmacological prophylaxis initiated/maintained  H: HOB Elevation   Head of Bed (HOB): HOB at 20-30 degrees  U: Ulcer Prophylaxis   GI: yes  G: Glucose control   managed    S: Skin   Bundle compliance: yes     Date: 3/13/20  B: Bowel Function   no issues   I: Indwelling Catheters   Thomas necessity:  not needed   CVC necessity: No   IPAD offered: Not appropriate  D: De-escalation Antibx   Yes  Plan for the day   Monitor oxygen, pending COVID-19 test   Family/Goals of care/Code Status   Code Status: Full Code     No acute events throughout day, VS and assessment per flow sheet, patient progressing towards goals as tolerated, plan of care reviewed with Kt Lutz and family, all concerns addressed, will continue to monitor.

## 2020-03-14 NOTE — ASSESSMENT & PLAN NOTE
This is a 54M with a PMHx of tobacco abuse (current active smoker/40 pack year hx), COPD (recently diagnosed on hospitalization 3 weeks ago) p/w SOB and admitted with COPD exacerbation.     Resp clx pending  RIP/influnza negative  CXR shows possible PNA      Plan:    1. Follow up COVID-19 results.  2. Airborne/droplate Isolation. (neg pressure room).  3. If not in neg pressure room switch nebulizer to inhaler.  4. Would avoid use of Bipap if patient is not in a neg pressure room.  5. Sats Goal 88-92%.

## 2020-03-14 NOTE — CARE UPDATE
Pt stepped up from the floors for inability to improve on Bipap for the last two days. Pt is a rule out Covid 19. Pt placed on Bipap at documented settings and report given to Jai Zamorano RRT.

## 2020-03-14 NOTE — RESPIRATORY THERAPY
Rapid Response Respiratory Follow Up Therapy Note     Followed up with patient for proactive rounding. Called to transport pt to CMICU. During transfer pt on 3L NC with no adverse reactions. Dr. Cortez on transport. BIPAP at bedside, MD didn't want to travel on BIPAP  No acute issues at this time. Plan of care reviewed with primary RTSebastian.  Please call Rapid Response RTYamileth, [place] [phone] with any questions or concerns.

## 2020-03-14 NOTE — HPI
Kt Lutz is a 54M with a PMHx of tobacco abuse (current active smoker/40 pack year hx), COPD (recently diagnosed on hospitalization 3 weeks ago), alcohol/drug abuse (in remission) who presents to emergency department of [facility] on 03/12/2020 for with shortness of breath, malaise and dry cough for 2 weeks. Symptoms progressive since diagnosis. He denies fevers, chills, but does report headache.     Pt was admitted to Hospital Medicine on 03/12/2020 for COPD exacerbation. On admission, oxygen saturation in low 80s and ABG with pCO2 in low 70s and patient initiated on BiPAP, Q4 nebs, vanc/zosyn, methylprednisolone, Breo and Spiriva. On and off BiPAP. pCO2 low 90s the next morning. Discussed need to remain on BiPAP today and BiPAP settings increased to 20/5.  Despite orders for continuous BiPAP, patient intermittently removing BiPAP to go outside and smoke a cigarette.  PCO2 remains elevated.  Pulmonology consult given patient's poor response to BiPAP efforts.      Despite intermittent BiPAP for the past 36 hours the pt's PCO2 remains elevated. Mild interstitial edema versus pneumonia on CXR. Critical Care to move pt to the ICU for airway watch and COVID testing.

## 2020-03-14 NOTE — H&P
[facility]  Critical Care Medicine  History & Physical    Patient Name: Kt Lutz  MRN: [id number]  Admission Date: 3/12/2020  Hospital Length of Stay: 2 days  Code Status: Full Code  Attending Physician: Kristan Valerio MD   Primary Care Provider: Primary Doctor No   Principal Problem: Acute respiratory failure with hypoxia and hypercapnia    Subjective:     HPI:  Kt Lutz is a 54M with a PMHx of tobacco abuse (current active smoker/40 pack year hx), COPD (recently diagnosed on hospitalization 3 weeks ago), alcohol/drug abuse (in remission) who presents to emergency department of [facility] on 03/12/2020 for with shortness of breath, malaise and dry cough for 2 weeks. Symptoms progressive since diagnosis. He denies fevers, chills, but does report headache.     Pt was admitted to Hospital Medicine on 03/12/2020 for COPD exacerbation. On admission, oxygen saturation in low 80s and ABG with pCO2 in low 70s and patient initiated on BiPAP, Q4 nebs, vanc/zosyn, methylprednisolone, Breo and Spiriva. On and off BiPAP. pCO2 low 90s the next morning. Discussed need to remain on BiPAP today and BiPAP settings increased to 20/5.  Despite orders for continuous BiPAP, patient intermittently removing BiPAP to go outside and smoke a cigarette.  PCO2 remains elevated.  Pulmonology consult given patient's poor response to BiPAP efforts.      Despite intermittent BiPAP for the past 36 hours the pt's PCO2 remains elevated. Mild interstitial edema versus pneumonia on CXR. Critical Care to move pt to the ICU for airway watch and COVID testing.        Past Medical History:   Diagnosis Date    COPD (chronic obstructive pulmonary disease) 2/4/2020    Hypertension        Past Surgical History:   Procedure Laterality Date    HERNIA REPAIR      LEG SURGERY         Review of patient's allergies indicates:   Allergen Reactions    Codeine Rash    Penicillins Rash     Patient does not endorse any  anaphylactic like reactions to PCN.  Tolerated a dose of Pip/tazo on 3/13/20       Family History     None        Tobacco Use    Smoking status: Current Every Day Smoker     Packs/day: 0.50     Types: Cigarettes    Smokeless tobacco: Never Used    Tobacco comment: 10 cigarettes per day   Substance and Sexual Activity    Alcohol use: Never     Frequency: Never     Comment: In [employer] program    Drug use: Not on file    Sexual activity: Never      Review of Systems   (please refer to attending/fellow note)  Objective:     Vital Signs (Most Recent):  Temp: 98 °F (36.7 °C) (03/14/20 0804)  Pulse: 70 (03/14/20 1300)  Resp: (!) 22 (03/14/20 1300)  BP: 129/72 (03/14/20 1128)  SpO2: 96 % (03/14/20 1300) Vital Signs (24h Range):  Temp:  [98 °F (36.7 °C)-98.4 °F (36.9 °C)] 98 °F (36.7 °C)  Pulse:  [70-98] 70  Resp:  [16-30] 22  SpO2:  [91 %-96 %] 96 %  BP: (109-142)/(64-86) 129/72   Weight: 111.7 kg (246 lb 2.3 oz)  Body mass index is 36.35 kg/m².      Intake/Output Summary (Last 24 hours) at 3/14/2020 1448  Last data filed at 3/14/2020 0700  Gross per 24 hour   Intake 475 ml   Output --   Net 475 ml       Physical Exam (please refer to attending/fellow note)    Vents:  Oxygen Concentration (%): 30 (03/14/20 1300)  Lines/Drains/Airways     Peripheral Intravenous Line                 Peripheral IV - Single Lumen 03/12/20 1016 20 G Left Hand 2 days              Significant Labs:    CBC/Anemia Profile:  Recent Labs   Lab 03/13/20  0439 03/14/20  0319   WBC 10.98 11.15   HGB 13.0* 13.3*   HCT 44.8 47.1    304   * 102*   RDW 14.2 14.2        Chemistries:  Recent Labs   Lab 03/13/20  0438 03/14/20  0319    141   K 4.2 4.3    103   CO2 35* 30*   BUN 17 14   CREATININE 1.0 0.7   CALCIUM 7.7* 7.9*   ALBUMIN 2.8* 2.9*   PROT 5.9* 6.0   BILITOT 0.2 0.1   ALKPHOS 67 66   ALT 34 33   AST 21 17   MG 2.2 2.3   PHOS 4.4 3.0       A1C: No results for input(s): HGBA1C in the last 48 hours.  Blood Culture:    Recent Labs   Lab 03/12/20  1738   LABBLOO No Growth to date  No Growth to date  No Growth to date  No Growth to date     BMP:   Recent Labs   Lab 03/14/20  0319   GLU 93      K 4.3      CO2 30*   BUN 14   CREATININE 0.7   CALCIUM 7.9*   MG 2.3     CMP:   Recent Labs   Lab 03/13/20  0438 03/14/20  0319    141   K 4.2 4.3    103   CO2 35* 30*    93   BUN 17 14   CREATININE 1.0 0.7   CALCIUM 7.7* 7.9*   PROT 5.9* 6.0   ALBUMIN 2.8* 2.9*   BILITOT 0.2 0.1   ALKPHOS 67 66   AST 21 17   ALT 34 33   ANIONGAP 8 8   EGFRNONAA >60.0 >60.0     Coagulation:   Recent Labs   Lab 03/12/20  1738   INR 1.0     Lactic Acid: No results for input(s): LACTATE in the last 48 hours.  Lipid Panel: No results for input(s): CHOL, HDL, LDLCALC, TRIG, CHOLHDL in the last 48 hours.  Respiratory Culture:   Recent Labs   Lab 03/12/20  1534   GSRESP <10 epithelial cells per low power field.  Rare WBC's  Many Gram positive cocci  Few Gram positive rods  Rare Gram negative rods   RESPIRATORYC Normal respiratory eric  No S aureus or Pseudomonas isolated.     Troponin: No results for input(s): TROPONINI in the last 48 hours.  Urine Culture: No results for input(s): LABURIN in the last 48 hours.  Urine Studies: No results for input(s): COLORU, APPEARANCEUA, PHUR, SPECGRAV, PROTEINUA, GLUCUA, KETONESU, BILIRUBINUA, OCCULTUA, NITRITE, UROBILINOGEN, LEUKOCYTESUR, RBCUA, WBCUA, BACTERIA, SQUAMEPITHEL, HYALINECASTS in the last 48 hours.    Invalid input(s): WRIGHTSUR  All pertinent labs within the past 24 hours have been reviewed.    Significant Imaging: I have reviewed and interpreted all pertinent imaging results/findings within the past 24 hours.    Assessment/Plan:     Pulmonary  * Acute respiratory failure with hypoxia and hypercapnia  This is a 54M with a PMHx of tobacco abuse (current active smoker/40 pack year hx), COPD (recently diagnosed on hospitalization 3 weeks ago) p/w SOB and admitted with COPD  exacerbation.     Resp clx pending  RIP/influnza negative  CXR shows possible PNA      Plan:    1. Follow up COVID-19 results.  2. Airborne/droplate Isolation. (neg pressure room).  3. If not in neg pressure room switch nebulizer to inhaler.  4. Would avoid use of Bipap if patient is not in a neg pressure room.  5. Sats Goal 88-92%.    COPD with acute exacerbation  - On Spiriva and Symbicort inhalers at home + prn albuterol.  - not on home oxygen.      Cardiac/Vascular  Essential hypertension  - Continue home lisinopril.    Other  Tobacco abuse  - nicotine patch if needed.           Critical secondary to Patient has a condition that poses threat to life and bodily function: Severe Respiratory Distress     Critical care was time spent personally by me on the following activities: development of treatment plan with patient or surrogate and bedside caregivers, discussions with consultants, evaluation of patient's response to treatment, examination of patient, ordering and performing treatments and interventions, ordering and review of laboratory studies, ordering and review of radiographic studies, pulse oximetry, re-evaluation of patient's condition. This critical care time did not overlap with that of any other provider or involve time for any procedures.     Crispin Mathews MD  Critical Care Medicine  [facility]

## 2020-03-14 NOTE — CARE UPDATE
RAPID RESPONSE RN CONSULT     Contacted by Bienvenido to evaluateKt Lutz for Covid upgrade.   Bed control contacted, pt to be moved to ICU for negative pressure room. Instructed to call  and activate a rapid response if patient's condition changes prior to arrival.

## 2020-03-14 NOTE — ASSESSMENT & PLAN NOTE
- Goal saturation 88-92% given COPD  - BiPAP 20/5 30% O2 Continuous   - VBG PRN    Latest VBG:  Recent Labs     03/14/20  0504   PH 7.290*   PCO2 81.5*   PO2 30*   HCO3 39.2*   POCSATURATED 47*   BE 13

## 2020-03-15 PROBLEM — J44.1 COPD EXACERBATION: Status: ACTIVE | Noted: 2020-03-15

## 2020-03-15 PROBLEM — Z20.822 SUSPECTED COVID-19 VIRUS INFECTION: Status: ACTIVE | Noted: 2020-03-15

## 2020-03-15 LAB
ADENOVIRUS: NOT DETECTED
ALBUMIN SERPL BCP-MCNC: 2.8 G/DL (ref 3.5–5.2)
ALP SERPL-CCNC: 57 U/L (ref 55–135)
ALT SERPL W/O P-5'-P-CCNC: 26 U/L (ref 10–44)
ANION GAP SERPL CALC-SCNC: 7 MMOL/L (ref 8–16)
AST SERPL-CCNC: 13 U/L (ref 10–40)
BASOPHILS # BLD AUTO: 0.02 K/UL (ref 0–0.2)
BASOPHILS NFR BLD: 0.2 % (ref 0–1.9)
BILIRUB SERPL-MCNC: 0.2 MG/DL (ref 0.1–1)
BORDETELLA PARAPERTUSSIS (IS1001): NOT DETECTED
BORDETELLA PERTUSSIS (PTXP): NOT DETECTED
BUN SERPL-MCNC: 17 MG/DL (ref 6–20)
CALCIUM SERPL-MCNC: 8 MG/DL (ref 8.7–10.5)
CHLAMYDIA PNEUMONIAE: NOT DETECTED
CHLORIDE SERPL-SCNC: 102 MMOL/L (ref 95–110)
CO2 SERPL-SCNC: 34 MMOL/L (ref 23–29)
CORONAVIRUS 229E, COMMON COLD VIRUS: NOT DETECTED
CORONAVIRUS HKU1, COMMON COLD VIRUS: NOT DETECTED
CORONAVIRUS NL63, COMMON COLD VIRUS: NOT DETECTED
CORONAVIRUS OC43, COMMON COLD VIRUS: NOT DETECTED
CREAT SERPL-MCNC: 0.7 MG/DL (ref 0.5–1.4)
DIFFERENTIAL METHOD: ABNORMAL
EOSINOPHIL # BLD AUTO: 0.1 K/UL (ref 0–0.5)
EOSINOPHIL NFR BLD: 0.6 % (ref 0–8)
ERYTHROCYTE [DISTWIDTH] IN BLOOD BY AUTOMATED COUNT: 13.8 % (ref 11.5–14.5)
EST. GFR  (AFRICAN AMERICAN): >60 ML/MIN/1.73 M^2
EST. GFR  (NON AFRICAN AMERICAN): >60 ML/MIN/1.73 M^2
FLUBV RNA NPH QL NAA+NON-PROBE: NOT DETECTED
GLUCOSE SERPL-MCNC: 96 MG/DL (ref 70–110)
HCT VFR BLD AUTO: 46.1 % (ref 40–54)
HGB BLD-MCNC: 13.3 G/DL (ref 14–18)
HPIV1 RNA NPH QL NAA+NON-PROBE: NOT DETECTED
HPIV2 RNA NPH QL NAA+NON-PROBE: NOT DETECTED
HPIV3 RNA NPH QL NAA+NON-PROBE: NOT DETECTED
HPIV4 RNA NPH QL NAA+NON-PROBE: NOT DETECTED
HUMAN METAPNEUMOVIRUS: NOT DETECTED
IMM GRANULOCYTES # BLD AUTO: 0.05 K/UL (ref 0–0.04)
IMM GRANULOCYTES NFR BLD AUTO: 0.5 % (ref 0–0.5)
INFLUENZA A (SUBTYPES H1,H1-2009,H3): NOT DETECTED
LYMPHOCYTES # BLD AUTO: 1.7 K/UL (ref 1–4.8)
LYMPHOCYTES NFR BLD: 17 % (ref 18–48)
MAGNESIUM SERPL-MCNC: 2.1 MG/DL (ref 1.6–2.6)
MCH RBC QN AUTO: 29 PG (ref 27–31)
MCHC RBC AUTO-ENTMCNC: 28.9 G/DL (ref 32–36)
MCV RBC AUTO: 101 FL (ref 82–98)
MONOCYTES # BLD AUTO: 0.8 K/UL (ref 0.3–1)
MONOCYTES NFR BLD: 8.5 % (ref 4–15)
MYCOPLASMA PNEUMONIAE: NOT DETECTED
NEUTROPHILS # BLD AUTO: 7.2 K/UL (ref 1.8–7.7)
NEUTROPHILS NFR BLD: 73.2 % (ref 38–73)
NRBC BLD-RTO: 0 /100 WBC
PHOSPHATE SERPL-MCNC: 3.8 MG/DL (ref 2.7–4.5)
PLATELET # BLD AUTO: 266 K/UL (ref 150–350)
PMV BLD AUTO: 9.2 FL (ref 9.2–12.9)
POTASSIUM SERPL-SCNC: 4.1 MMOL/L (ref 3.5–5.1)
PROT SERPL-MCNC: 5.8 G/DL (ref 6–8.4)
RBC # BLD AUTO: 4.58 M/UL (ref 4.6–6.2)
RESPIRATORY INFECTION PANEL SOURCE: NORMAL
RSV RNA NPH QL NAA+NON-PROBE: NOT DETECTED
RV+EV RNA NPH QL NAA+NON-PROBE: NOT DETECTED
SODIUM SERPL-SCNC: 143 MMOL/L (ref 136–145)
WBC # BLD AUTO: 9.77 K/UL (ref 3.9–12.7)

## 2020-03-15 PROCEDURE — S4991 NICOTINE PATCH NONLEGEND: HCPCS | Performed by: STUDENT IN AN ORGANIZED HEALTH CARE EDUCATION/TRAINING PROGRAM

## 2020-03-15 PROCEDURE — 99233 PR SUBSEQUENT HOSPITAL CARE,LEVL III: ICD-10-PCS | Mod: ,,, | Performed by: INTERNAL MEDICINE

## 2020-03-15 PROCEDURE — 94640 AIRWAY INHALATION TREATMENT: CPT

## 2020-03-15 PROCEDURE — 94660 CPAP INITIATION&MGMT: CPT

## 2020-03-15 PROCEDURE — 83735 ASSAY OF MAGNESIUM: CPT

## 2020-03-15 PROCEDURE — 20000000 HC ICU ROOM

## 2020-03-15 PROCEDURE — 84100 ASSAY OF PHOSPHORUS: CPT

## 2020-03-15 PROCEDURE — 94761 N-INVAS EAR/PLS OXIMETRY MLT: CPT

## 2020-03-15 PROCEDURE — 99900035 HC TECH TIME PER 15 MIN (STAT)

## 2020-03-15 PROCEDURE — 80053 COMPREHEN METABOLIC PANEL: CPT

## 2020-03-15 PROCEDURE — 25000003 PHARM REV CODE 250: Performed by: STUDENT IN AN ORGANIZED HEALTH CARE EDUCATION/TRAINING PROGRAM

## 2020-03-15 PROCEDURE — 63600175 PHARM REV CODE 636 W HCPCS: Performed by: STUDENT IN AN ORGANIZED HEALTH CARE EDUCATION/TRAINING PROGRAM

## 2020-03-15 PROCEDURE — 25000242 PHARM REV CODE 250 ALT 637 W/ HCPCS: Performed by: STUDENT IN AN ORGANIZED HEALTH CARE EDUCATION/TRAINING PROGRAM

## 2020-03-15 PROCEDURE — 99233 SBSQ HOSP IP/OBS HIGH 50: CPT | Mod: ,,, | Performed by: INTERNAL MEDICINE

## 2020-03-15 PROCEDURE — 27000221 HC OXYGEN, UP TO 24 HOURS

## 2020-03-15 PROCEDURE — 63600175 PHARM REV CODE 636 W HCPCS: Performed by: HOSPITALIST

## 2020-03-15 PROCEDURE — 85025 COMPLETE CBC W/AUTO DIFF WBC: CPT

## 2020-03-15 RX ORDER — TRAMADOL HYDROCHLORIDE 50 MG/1
50 TABLET ORAL EVERY 6 HOURS PRN
Status: DISCONTINUED | OUTPATIENT
Start: 2020-03-15 | End: 2020-03-24 | Stop reason: HOSPADM

## 2020-03-15 RX ORDER — ALBUTEROL SULFATE 90 UG/1
2 AEROSOL, METERED RESPIRATORY (INHALATION) EVERY 4 HOURS
Status: DISCONTINUED | OUTPATIENT
Start: 2020-03-15 | End: 2020-03-16

## 2020-03-15 RX ORDER — IPRATROPIUM BROMIDE AND ALBUTEROL SULFATE 2.5; .5 MG/3ML; MG/3ML
3 SOLUTION RESPIRATORY (INHALATION) EVERY 6 HOURS
Status: DISCONTINUED | OUTPATIENT
Start: 2020-03-16 | End: 2020-03-15

## 2020-03-15 RX ADMIN — LEVOFLOXACIN 750 MG: 750 TABLET, FILM COATED ORAL at 08:03

## 2020-03-15 RX ADMIN — ENOXAPARIN SODIUM 40 MG: 100 INJECTION SUBCUTANEOUS at 05:03

## 2020-03-15 RX ADMIN — TIOTROPIUM BROMIDE 18 MCG: 18 CAPSULE ORAL; RESPIRATORY (INHALATION) at 08:03

## 2020-03-15 RX ADMIN — LISINOPRIL 20 MG: 20 TABLET ORAL at 08:03

## 2020-03-15 RX ADMIN — PREDNISONE 40 MG: 10 TABLET ORAL at 08:03

## 2020-03-15 RX ADMIN — IPRATROPIUM BROMIDE AND ALBUTEROL SULFATE 3 ML: .5; 3 SOLUTION RESPIRATORY (INHALATION) at 12:03

## 2020-03-15 RX ADMIN — TRAMADOL HYDROCHLORIDE 50 MG: 50 TABLET, FILM COATED ORAL at 05:03

## 2020-03-15 RX ADMIN — IPRATROPIUM BROMIDE AND ALBUTEROL SULFATE 3 ML: .5; 3 SOLUTION RESPIRATORY (INHALATION) at 04:03

## 2020-03-15 RX ADMIN — FLUTICASONE FUROATE AND VILANTEROL TRIFENATATE 1 PUFF: 100; 25 POWDER RESPIRATORY (INHALATION) at 08:03

## 2020-03-15 RX ADMIN — IPRATROPIUM BROMIDE AND ALBUTEROL SULFATE 3 ML: .5; 3 SOLUTION RESPIRATORY (INHALATION) at 09:03

## 2020-03-15 RX ADMIN — NICOTINE 1 PATCH: 14 PATCH TRANSDERMAL at 08:03

## 2020-03-15 NOTE — RESIDENT HANDOFF
Handoff     Primary Team: Networked reference to record PCT  Room Number: 6089/6089 A     Patient Name: Kt Lutz MRN: [id number]     Date of Birth: [id number] Allergies: Codeine and Penicillins     Age: 54 y.o. Admit Date: 3/12/2020     Sex: male  BMI: Body mass index is 37.08 kg/m².     Code Status: Full Code        Illness Level (current clinical status): Watcher - No    Reason for Admission: Acute respiratory failure with hypoxia and hypercapnia    Brief HPI (pertinent PMH and diagnosis or differential diagnosis):  Kt Lutz is a 54M with a PMHx of tobacco abuse (current active smoker/40 pack year hx), COPD (recently diagnosed on hospitalization 3 weeks ago), alcohol/drug abuse (in remission) who presents to emergency department of [facility] on 03/12/2020 for with shortness of breath, malaise and dry cough for 2 weeks. Symptoms progressive since diagnosis. He denies fevers, chills, but does report headache.     Pt was admitted to Hospital Medicine on 03/12/2020 for COPD exacerbation. On admission, oxygen saturation in low 80s and ABG with pCO2 in low 70s and patient initiated on BiPAP, Q4 nebs, vanc/zosyn, methylprednisolone, Breo and Spiriva. On and off BiPAP. pCO2 low 90s the next morning. Discussed need to remain on BiPAP today and BiPAP settings increased to 20/5.  Despite orders for continuous BiPAP, patient intermittently removing BiPAP to go outside and smoke a cigarette.  PCO2 remains elevated.  Pulmonology consult given patient's poor response to BiPAP efforts.      Despite intermittent BiPAP for the past 36 hours the pt's PCO2 remains elevated. Mild interstitial edema versus pneumonia on CXR. Critical Care to move pt to the ICU for airway watch and COVID testing.       Hospital Course (updated, brief assessment by system or problem, significant events):   Pt was admitted to MICU with continuous BiPAP- sating well on 30% 20/5- pt is being tested for Covid, stable to step down to the  floor.    Tasks (specific, using if-then statements):     1- Follow up Covid19 test.  2- Continue Abx course.  3- smoking cessation.  4- BP control.  5- biPAP qhs/prn            Angelia Duong MD  Internal Medicine  PGY-3

## 2020-03-15 NOTE — SUBJECTIVE & OBJECTIVE
Interval History/Significant Events: on biPAP all night- this morning sating 92% on 2 L NC.    Review of Systems   Constitutional: Negative for appetite change, chills and fever.   HENT: Negative for congestion.    Eyes: Negative for pain and itching.   Respiratory: Negative for cough, chest tightness and shortness of breath.    Cardiovascular: Negative for chest pain, palpitations and leg swelling.   Gastrointestinal: Negative for abdominal pain and nausea.   Endocrine: Negative for polyphagia and polyuria.   Genitourinary: Negative for dysuria and frequency.   Musculoskeletal: Negative for back pain.   Neurological: Negative for numbness and headaches.   Psychiatric/Behavioral: Negative for agitation and behavioral problems.     Objective:     Vital Signs (Most Recent):  Temp: 98.4 °F (36.9 °C) (03/15/20 1101)  Pulse: 88 (03/15/20 1200)  Resp: (!) 40 (03/15/20 1200)  BP: (!) 149/75 (03/15/20 1200)  SpO2: (!) 89 % (03/15/20 1200) Vital Signs (24h Range):  Temp:  [97.9 °F (36.6 °C)-98.4 °F (36.9 °C)] 98.4 °F (36.9 °C)  Pulse:  [67-97] 88  Resp:  [19-40] 40  SpO2:  [89 %-98 %] 89 %  BP: (118-178)/() 149/75   Weight: 113.9 kg (251 lb 1.7 oz)  Body mass index is 37.08 kg/m².      Intake/Output Summary (Last 24 hours) at 3/15/2020 1245  Last data filed at 3/15/2020 1200  Gross per 24 hour   Intake 600 ml   Output 2100 ml   Net -1500 ml       Physical Exam   Constitutional: He is oriented to person, place, and time. He appears well-developed.   HENT:   Head: Normocephalic.   Cardiovascular: Normal rate, regular rhythm and normal heart sounds.   No murmur heard.  Pulmonary/Chest: Effort normal and breath sounds normal.   Abdominal: Soft. Bowel sounds are normal. He exhibits no distension. There is no tenderness.   Musculoskeletal: Normal range of motion.   Neurological: He is alert and oriented to person, place, and time.   Psychiatric: He has a normal mood and affect. His behavior is normal.       Vents:  Oxygen  Concentration (%): 30 (03/15/20 0432)  Lines/Drains/Airways     Peripheral Intravenous Line                 Peripheral IV - Single Lumen 03/12/20 1016 20 G Left Hand 3 days         Peripheral IV - Single Lumen 03/14/20 1557 20 G Right Hand less than 1 day              Significant Labs:    CBC/Anemia Profile:  Recent Labs   Lab 03/14/20  0319 03/15/20  0435   WBC 11.15 9.77   HGB 13.3* 13.3*   HCT 47.1 46.1    266   * 101*   RDW 14.2 13.8        Chemistries:  Recent Labs   Lab 03/14/20  0319 03/15/20  0435    143   K 4.3 4.1    102   CO2 30* 34*   BUN 14 17   CREATININE 0.7 0.7   CALCIUM 7.9* 8.0*   ALBUMIN 2.9* 2.8*   PROT 6.0 5.8*   BILITOT 0.1 0.2   ALKPHOS 66 57   ALT 33 26   AST 17 13   MG 2.3 2.1   PHOS 3.0 3.8       All pertinent labs within the past 24 hours have been reviewed.    Significant Imaging:  I have reviewed and interpreted all pertinent imaging results/findings within the past 24 hours.

## 2020-03-15 NOTE — PROGRESS NOTES
[facility]  Critical Care Medicine  Progress Note    Patient Name: Kt Lutz  MRN: [id number]  Admission Date: 3/12/2020  Hospital Length of Stay: 3 days  Code Status: Full Code  Attending Provider: Kristan Valerio MD  Primary Care Provider: Primary Doctor No   Principal Problem: Acute respiratory failure with hypoxia and hypercapnia    Subjective:     HPI:  Kt Lutz is a 54M with a PMHx of tobacco abuse (current active smoker/40 pack year hx), COPD (recently diagnosed on hospitalization 3 weeks ago), alcohol/drug abuse (in remission) who presents to emergency department of [facility] on 03/12/2020 for with shortness of breath, malaise and dry cough for 2 weeks. Symptoms progressive since diagnosis. He denies fevers, chills, but does report headache.     Pt was admitted to Hospital Medicine on 03/12/2020 for COPD exacerbation. On admission, oxygen saturation in low 80s and ABG with pCO2 in low 70s and patient initiated on BiPAP, Q4 nebs, vanc/zosyn, methylprednisolone, Breo and Spiriva. On and off BiPAP. pCO2 low 90s the next morning. Discussed need to remain on BiPAP today and BiPAP settings increased to 20/5.  Despite orders for continuous BiPAP, patient intermittently removing BiPAP to go outside and smoke a cigarette.  PCO2 remains elevated.  Pulmonology consult given patient's poor response to BiPAP efforts.      Despite intermittent BiPAP for the past 36 hours the pt's PCO2 remains elevated. Mild interstitial edema versus pneumonia on CXR. Critical Care to move pt to the ICU for airway watch and COVID testing.     Hospital/ICU Course:  Pt was admitted to MICU with continuous BiPAP- sating well on 30% 20/5- pt is being tested for Covid, stable to step down to the floor.    Interval History/Significant Events: on biPAP all night- this morning sating 92% on 2 L NC.    Review of Systems   Constitutional: Negative for appetite change, chills and fever.   HENT: Negative for  congestion.    Eyes: Negative for pain and itching.   Respiratory: Negative for cough, chest tightness and shortness of breath.    Cardiovascular: Negative for chest pain, palpitations and leg swelling.   Gastrointestinal: Negative for abdominal pain and nausea.   Endocrine: Negative for polyphagia and polyuria.   Genitourinary: Negative for dysuria and frequency.   Musculoskeletal: Negative for back pain.   Neurological: Negative for numbness and headaches.   Psychiatric/Behavioral: Negative for agitation and behavioral problems.     Objective:     Vital Signs (Most Recent):  Temp: 98.4 °F (36.9 °C) (03/15/20 1101)  Pulse: 88 (03/15/20 1200)  Resp: (!) 40 (03/15/20 1200)  BP: (!) 149/75 (03/15/20 1200)  SpO2: (!) 89 % (03/15/20 1200) Vital Signs (24h Range):  Temp:  [97.9 °F (36.6 °C)-98.4 °F (36.9 °C)] 98.4 °F (36.9 °C)  Pulse:  [67-97] 88  Resp:  [19-40] 40  SpO2:  [89 %-98 %] 89 %  BP: (118-178)/() 149/75   Weight: 113.9 kg (251 lb 1.7 oz)  Body mass index is 37.08 kg/m².      Intake/Output Summary (Last 24 hours) at 3/15/2020 1245  Last data filed at 3/15/2020 1200  Gross per 24 hour   Intake 600 ml   Output 2100 ml   Net -1500 ml       Physical Exam   Constitutional: He is oriented to person, place, and time. He appears well-developed.   HENT:   Head: Normocephalic.   Cardiovascular: Normal rate, regular rhythm and normal heart sounds.   No murmur heard.  Pulmonary/Chest: Effort normal and breath sounds normal.   Abdominal: Soft. Bowel sounds are normal. He exhibits no distension. There is no tenderness.   Musculoskeletal: Normal range of motion.   Neurological: He is alert and oriented to person, place, and time.   Psychiatric: He has a normal mood and affect. His behavior is normal.       Vents:  Oxygen Concentration (%): 30 (03/15/20 0432)  Lines/Drains/Airways     Peripheral Intravenous Line                 Peripheral IV - Single Lumen 03/12/20 1016 20 G Left Hand 3 days         Peripheral IV -  Single Lumen 03/14/20 1557 20 G Right Hand less than 1 day              Significant Labs:    CBC/Anemia Profile:  Recent Labs   Lab 03/14/20  0319 03/15/20  0435   WBC 11.15 9.77   HGB 13.3* 13.3*   HCT 47.1 46.1    266   * 101*   RDW 14.2 13.8        Chemistries:  Recent Labs   Lab 03/14/20  0319 03/15/20  0435    143   K 4.3 4.1    102   CO2 30* 34*   BUN 14 17   CREATININE 0.7 0.7   CALCIUM 7.9* 8.0*   ALBUMIN 2.9* 2.8*   PROT 6.0 5.8*   BILITOT 0.1 0.2   ALKPHOS 66 57   ALT 33 26   AST 17 13   MG 2.3 2.1   PHOS 3.0 3.8       All pertinent labs within the past 24 hours have been reviewed.    Significant Imaging:  I have reviewed and interpreted all pertinent imaging results/findings within the past 24 hours.      ABG  Recent Labs   Lab 03/14/20  0504   PH 7.290*   PO2 30*   PCO2 81.5*   HCO3 39.2*   BE 13     Assessment/Plan:     Pulmonary  * Acute respiratory failure with hypoxia and hypercapnia  This is a 54M with a PMHx of tobacco abuse (current active smoker/40 pack year hx), COPD (recently diagnosed on hospitalization 3 weeks ago) p/w SOB and admitted with COPD exacerbation.     Resp clx pending  RIP/influnza negative  CXR shows possible PNA      Plan:    1. Follow up COVID-19 results.  2. Airborne/droplate Isolation. (neg pressure room).  3. If not in neg pressure room switch nebulizer to inhaler.  4. Would avoid use of Bipap if patient is not in a neg pressure room.  5. Sats Goal 88-92%.    COPD with acute exacerbation  - On Spiriva and Symbicort inhalers at home + prn albuterol.  - not on home oxygen.      Cardiac/Vascular  Essential hypertension  - Continue home lisinopril.    Other  Tobacco abuse  - nicotine patch if needed.          Critical secondary to Patient has a condition that poses threat to life and bodily function: Severe Respiratory Distress      Critical care was time spent personally by me on the following activities: development of treatment plan with patient or  surrogate and bedside caregivers, discussions with consultants, evaluation of patient's response to treatment, examination of patient, ordering and performing treatments and interventions, ordering and review of laboratory studies, ordering and review of radiographic studies, pulse oximetry, re-evaluation of patient's condition. This critical care time did not overlap with that of any other provider or involve time for any procedures.     Angelia Duong MD  Critical Care Medicine  [facility]

## 2020-03-15 NOTE — PLAN OF CARE
Hospital Medicine Brief Note    Patient accepted to [facility] Medicine V for step down.    Veronica Reid M.D., M.P.H.  Department of [facility] Medicine  [facility]  (pager) [phone] (spect) [phone]

## 2020-03-15 NOTE — HOSPITAL COURSE
Pt was admitted to MICU with continuous BiPAP- sating well on 30% 20/5- pt is being tested for Covid, stable to step down to the floor.

## 2020-03-15 NOTE — PLAN OF CARE
CMICU DAILY GOALS       A: Awake    RASS: Goal - RASS Goal: 0-->alert and calm  Actual - RASS (Leal Agitation-Sedation Scale): 0-->alert and calm   Restraint necessity:  not needed  B: Breath   SBT: Not intubated   C: Coordinate A & B, analgesics/sedatives   Pain: managed    SAT: Not intubated  D: Delirium   CAM-ICU: Overall CAM-ICU: Negative  E: Early Mobility   MOVE Screen: Pass   Activity: Activity Management: activity clustered for rest period  FAS: Feeding/Nutrition   Diet order:  ,   Fluid restriction:    T: Thrombus   DVT prophylaxis: VTE Required Core Measure: Pharmacological prophylaxis initiated/maintained  H: HOB Elevation   Head of Bed (HOB): HOB at 30-45 degrees  U: Ulcer Prophylaxis   GI: yes  G: Glucose control   managed    S: Skin   Bundle compliance: yes   B: Bowel Function   no issues   I: Indwelling Catheters   Thomas necessity:  not needed   CVC necessity: No   IPAD offered: Yes  D: De-escalation Antibx   Yes  Plan for the day   Monitor oxygen, pending COVID-19 results, stepdown  Family/Goals of care/Code Status   Code Status: Full Code     No acute events throughout day, VS and assessment per flow sheet, patient progressing towards goals as tolerated, plan of care reviewed with Kt Lutz and family, all concerns addressed, will continue to monitor.

## 2020-03-16 LAB
ALBUMIN SERPL BCP-MCNC: 2.9 G/DL (ref 3.5–5.2)
ALP SERPL-CCNC: 54 U/L (ref 55–135)
ALT SERPL W/O P-5'-P-CCNC: 21 U/L (ref 10–44)
ANION GAP SERPL CALC-SCNC: 10 MMOL/L (ref 8–16)
AST SERPL-CCNC: 11 U/L (ref 10–40)
BASOPHILS # BLD AUTO: 0.03 K/UL (ref 0–0.2)
BASOPHILS NFR BLD: 0.3 % (ref 0–1.9)
BILIRUB SERPL-MCNC: 0.2 MG/DL (ref 0.1–1)
BUN SERPL-MCNC: 16 MG/DL (ref 6–20)
CALCIUM SERPL-MCNC: 8.3 MG/DL (ref 8.7–10.5)
CHLORIDE SERPL-SCNC: 101 MMOL/L (ref 95–110)
CO2 SERPL-SCNC: 30 MMOL/L (ref 23–29)
CREAT SERPL-MCNC: 0.8 MG/DL (ref 0.5–1.4)
DIFFERENTIAL METHOD: ABNORMAL
EOSINOPHIL # BLD AUTO: 0.1 K/UL (ref 0–0.5)
EOSINOPHIL NFR BLD: 0.5 % (ref 0–8)
ERYTHROCYTE [DISTWIDTH] IN BLOOD BY AUTOMATED COUNT: 13.7 % (ref 11.5–14.5)
EST. GFR  (AFRICAN AMERICAN): >60 ML/MIN/1.73 M^2
EST. GFR  (NON AFRICAN AMERICAN): >60 ML/MIN/1.73 M^2
GLUCOSE SERPL-MCNC: 87 MG/DL (ref 70–110)
HCT VFR BLD AUTO: 47 % (ref 40–54)
HGB BLD-MCNC: 13.7 G/DL (ref 14–18)
IMM GRANULOCYTES # BLD AUTO: 0.07 K/UL (ref 0–0.04)
IMM GRANULOCYTES NFR BLD AUTO: 0.7 % (ref 0–0.5)
LYMPHOCYTES # BLD AUTO: 2.1 K/UL (ref 1–4.8)
LYMPHOCYTES NFR BLD: 19.8 % (ref 18–48)
MAGNESIUM SERPL-MCNC: 2.1 MG/DL (ref 1.6–2.6)
MCH RBC QN AUTO: 28.5 PG (ref 27–31)
MCHC RBC AUTO-ENTMCNC: 29.1 G/DL (ref 32–36)
MCV RBC AUTO: 98 FL (ref 82–98)
MONOCYTES # BLD AUTO: 1 K/UL (ref 0.3–1)
MONOCYTES NFR BLD: 9.6 % (ref 4–15)
NEUTROPHILS # BLD AUTO: 7.2 K/UL (ref 1.8–7.7)
NEUTROPHILS NFR BLD: 69.1 % (ref 38–73)
NRBC BLD-RTO: 0 /100 WBC
PHOSPHATE SERPL-MCNC: 5.1 MG/DL (ref 2.7–4.5)
PLATELET # BLD AUTO: 322 K/UL (ref 150–350)
PMV BLD AUTO: 8.9 FL (ref 9.2–12.9)
POTASSIUM SERPL-SCNC: 4.2 MMOL/L (ref 3.5–5.1)
PROT SERPL-MCNC: 6 G/DL (ref 6–8.4)
RBC # BLD AUTO: 4.81 M/UL (ref 4.6–6.2)
SODIUM SERPL-SCNC: 141 MMOL/L (ref 136–145)
WBC # BLD AUTO: 10.39 K/UL (ref 3.9–12.7)

## 2020-03-16 PROCEDURE — 83735 ASSAY OF MAGNESIUM: CPT

## 2020-03-16 PROCEDURE — 63600175 PHARM REV CODE 636 W HCPCS: Performed by: STUDENT IN AN ORGANIZED HEALTH CARE EDUCATION/TRAINING PROGRAM

## 2020-03-16 PROCEDURE — 85025 COMPLETE CBC W/AUTO DIFF WBC: CPT

## 2020-03-16 PROCEDURE — 84100 ASSAY OF PHOSPHORUS: CPT

## 2020-03-16 PROCEDURE — 25000003 PHARM REV CODE 250: Performed by: STUDENT IN AN ORGANIZED HEALTH CARE EDUCATION/TRAINING PROGRAM

## 2020-03-16 PROCEDURE — 94761 N-INVAS EAR/PLS OXIMETRY MLT: CPT

## 2020-03-16 PROCEDURE — 11000001 HC ACUTE MED/SURG PRIVATE ROOM

## 2020-03-16 PROCEDURE — 25000242 PHARM REV CODE 250 ALT 637 W/ HCPCS: Performed by: INTERNAL MEDICINE

## 2020-03-16 PROCEDURE — 27000221 HC OXYGEN, UP TO 24 HOURS

## 2020-03-16 PROCEDURE — 80053 COMPREHEN METABOLIC PANEL: CPT

## 2020-03-16 PROCEDURE — 94640 AIRWAY INHALATION TREATMENT: CPT

## 2020-03-16 PROCEDURE — 63600175 PHARM REV CODE 636 W HCPCS: Performed by: HOSPITALIST

## 2020-03-16 PROCEDURE — 25000242 PHARM REV CODE 250 ALT 637 W/ HCPCS: Performed by: STUDENT IN AN ORGANIZED HEALTH CARE EDUCATION/TRAINING PROGRAM

## 2020-03-16 PROCEDURE — S4991 NICOTINE PATCH NONLEGEND: HCPCS | Performed by: STUDENT IN AN ORGANIZED HEALTH CARE EDUCATION/TRAINING PROGRAM

## 2020-03-16 RX ORDER — FLUTICASONE FUROATE AND VILANTEROL 100; 25 UG/1; UG/1
1 POWDER RESPIRATORY (INHALATION) DAILY
Status: DISCONTINUED | OUTPATIENT
Start: 2020-03-17 | End: 2020-03-24 | Stop reason: HOSPADM

## 2020-03-16 RX ORDER — ALBUTEROL SULFATE 90 UG/1
4 AEROSOL, METERED RESPIRATORY (INHALATION)
Status: DISCONTINUED | OUTPATIENT
Start: 2020-03-16 | End: 2020-03-24 | Stop reason: HOSPADM

## 2020-03-16 RX ORDER — TIOTROPIUM BROMIDE 18 UG/1
18 CAPSULE ORAL; RESPIRATORY (INHALATION) DAILY
Status: DISCONTINUED | OUTPATIENT
Start: 2020-03-17 | End: 2020-03-24 | Stop reason: HOSPADM

## 2020-03-16 RX ADMIN — TIOTROPIUM BROMIDE 18 MCG: 18 CAPSULE ORAL; RESPIRATORY (INHALATION) at 08:03

## 2020-03-16 RX ADMIN — ENOXAPARIN SODIUM 40 MG: 100 INJECTION SUBCUTANEOUS at 09:03

## 2020-03-16 RX ADMIN — LISINOPRIL 20 MG: 20 TABLET ORAL at 08:03

## 2020-03-16 RX ADMIN — PREDNISONE 40 MG: 10 TABLET ORAL at 08:03

## 2020-03-16 RX ADMIN — FLUTICASONE FUROATE AND VILANTEROL TRIFENATATE 1 PUFF: 100; 25 POWDER RESPIRATORY (INHALATION) at 08:03

## 2020-03-16 RX ADMIN — ALBUTEROL SULFATE 4 PUFF: 90 AEROSOL, METERED RESPIRATORY (INHALATION) at 09:03

## 2020-03-16 RX ADMIN — LEVOFLOXACIN 750 MG: 750 TABLET, FILM COATED ORAL at 08:03

## 2020-03-16 RX ADMIN — TRAMADOL HYDROCHLORIDE 50 MG: 50 TABLET, FILM COATED ORAL at 08:03

## 2020-03-16 RX ADMIN — NICOTINE 1 PATCH: 14 PATCH TRANSDERMAL at 08:03

## 2020-03-16 NOTE — PLAN OF CARE
Problem: Adult Inpatient Plan of Care  Goal: Plan of Care Review  Outcome: Ongoing, Progressing  Goal: Patient-Specific Goal (Individualization)  Outcome: Ongoing, Progressing  Goal: Absence of Hospital-Acquired Illness or Injury  Outcome: Ongoing, Progressing  Goal: Optimal Comfort and Wellbeing  Outcome: Ongoing, Progressing  Goal: Readiness for Transition of Care  Outcome: Ongoing, Progressing  Goal: Rounds/Family Conference  Outcome: Ongoing, Progressing     Problem: Fall Injury Risk  Goal: Absence of Fall and Fall-Related Injury  Outcome: Ongoing, Progressing     AAOx4. VS stable. No PRN medications administered. Bed low, wheels locked, call light within reach. Will continue to monitor pt.

## 2020-03-16 NOTE — NURSING
Nursing Transfer Note      3/16/2020     Transfer To:     Transfer via wheelchair    Transfer with 2 L to O2, cardiac monitoring, suspected COVID 19 contact precautions    Transported by Jaswinder RN & Sandra, PCT    Medicines sent: tiotropium, brio inhaler    Chart send with patient: Yes    Notified: Per pt, pt will notify his brother    Upon arrival to floor: cardiac monitor applied, patient oriented to room, call bell in reach and bed in lowest position, DELPHINE Bowie at bedside to receive pt

## 2020-03-17 LAB
ALBUMIN SERPL BCP-MCNC: 3.1 G/DL (ref 3.5–5.2)
ALP SERPL-CCNC: 58 U/L (ref 55–135)
ALT SERPL W/O P-5'-P-CCNC: 22 U/L (ref 10–44)
ANION GAP SERPL CALC-SCNC: 14 MMOL/L (ref 8–16)
AST SERPL-CCNC: 20 U/L (ref 10–40)
BACTERIA BLD CULT: NORMAL
BACTERIA BLD CULT: NORMAL
BASOPHILS # BLD AUTO: 0.05 K/UL (ref 0–0.2)
BASOPHILS NFR BLD: 0.4 % (ref 0–1.9)
BILIRUB SERPL-MCNC: 0.3 MG/DL (ref 0.1–1)
BUN SERPL-MCNC: 14 MG/DL (ref 6–20)
CALCIUM SERPL-MCNC: 8.6 MG/DL (ref 8.7–10.5)
CHLORIDE SERPL-SCNC: 101 MMOL/L (ref 95–110)
CO2 SERPL-SCNC: 28 MMOL/L (ref 23–29)
CREAT SERPL-MCNC: 0.8 MG/DL (ref 0.5–1.4)
DIFFERENTIAL METHOD: ABNORMAL
EOSINOPHIL # BLD AUTO: 0.1 K/UL (ref 0–0.5)
EOSINOPHIL NFR BLD: 0.6 % (ref 0–8)
ERYTHROCYTE [DISTWIDTH] IN BLOOD BY AUTOMATED COUNT: 13.9 % (ref 11.5–14.5)
EST. GFR  (AFRICAN AMERICAN): >60 ML/MIN/1.73 M^2
EST. GFR  (NON AFRICAN AMERICAN): >60 ML/MIN/1.73 M^2
GLUCOSE SERPL-MCNC: 76 MG/DL (ref 70–110)
HCT VFR BLD AUTO: 47.5 % (ref 40–54)
HGB BLD-MCNC: 14.7 G/DL (ref 14–18)
IMM GRANULOCYTES # BLD AUTO: 0.12 K/UL (ref 0–0.04)
IMM GRANULOCYTES NFR BLD AUTO: 1 % (ref 0–0.5)
LYMPHOCYTES # BLD AUTO: 2.3 K/UL (ref 1–4.8)
LYMPHOCYTES NFR BLD: 18.4 % (ref 18–48)
MAGNESIUM SERPL-MCNC: 2.1 MG/DL (ref 1.6–2.6)
MCH RBC QN AUTO: 29.2 PG (ref 27–31)
MCHC RBC AUTO-ENTMCNC: 30.9 G/DL (ref 32–36)
MCV RBC AUTO: 94 FL (ref 82–98)
MONOCYTES # BLD AUTO: 1.2 K/UL (ref 0.3–1)
MONOCYTES NFR BLD: 9.4 % (ref 4–15)
NEUTROPHILS # BLD AUTO: 8.7 K/UL (ref 1.8–7.7)
NEUTROPHILS NFR BLD: 70.2 % (ref 38–73)
NRBC BLD-RTO: 1 /100 WBC
PHOSPHATE SERPL-MCNC: 4.5 MG/DL (ref 2.7–4.5)
PLATELET # BLD AUTO: 327 K/UL (ref 150–350)
PMV BLD AUTO: 10.1 FL (ref 9.2–12.9)
POTASSIUM SERPL-SCNC: 4.6 MMOL/L (ref 3.5–5.1)
PROT SERPL-MCNC: 6.4 G/DL (ref 6–8.4)
RBC # BLD AUTO: 5.04 M/UL (ref 4.6–6.2)
SODIUM SERPL-SCNC: 143 MMOL/L (ref 136–145)
WBC # BLD AUTO: 12.39 K/UL (ref 3.9–12.7)

## 2020-03-17 PROCEDURE — 84100 ASSAY OF PHOSPHORUS: CPT

## 2020-03-17 PROCEDURE — 25000242 PHARM REV CODE 250 ALT 637 W/ HCPCS: Performed by: INTERNAL MEDICINE

## 2020-03-17 PROCEDURE — S4991 NICOTINE PATCH NONLEGEND: HCPCS | Performed by: STUDENT IN AN ORGANIZED HEALTH CARE EDUCATION/TRAINING PROGRAM

## 2020-03-17 PROCEDURE — 25000003 PHARM REV CODE 250: Performed by: STUDENT IN AN ORGANIZED HEALTH CARE EDUCATION/TRAINING PROGRAM

## 2020-03-17 PROCEDURE — 11000001 HC ACUTE MED/SURG PRIVATE ROOM

## 2020-03-17 PROCEDURE — 25000003 PHARM REV CODE 250: Performed by: INTERNAL MEDICINE

## 2020-03-17 PROCEDURE — 94640 AIRWAY INHALATION TREATMENT: CPT

## 2020-03-17 PROCEDURE — 83735 ASSAY OF MAGNESIUM: CPT

## 2020-03-17 PROCEDURE — 99232 SBSQ HOSP IP/OBS MODERATE 35: CPT | Mod: ,,, | Performed by: INTERNAL MEDICINE

## 2020-03-17 PROCEDURE — 36415 COLL VENOUS BLD VENIPUNCTURE: CPT

## 2020-03-17 PROCEDURE — 99232 PR SUBSEQUENT HOSPITAL CARE,LEVL II: ICD-10-PCS | Mod: ,,, | Performed by: INTERNAL MEDICINE

## 2020-03-17 PROCEDURE — 80053 COMPREHEN METABOLIC PANEL: CPT

## 2020-03-17 PROCEDURE — 85025 COMPLETE CBC W/AUTO DIFF WBC: CPT

## 2020-03-17 PROCEDURE — 94761 N-INVAS EAR/PLS OXIMETRY MLT: CPT

## 2020-03-17 RX ORDER — ENOXAPARIN SODIUM 100 MG/ML
40 INJECTION SUBCUTANEOUS NIGHTLY
Status: DISCONTINUED | OUTPATIENT
Start: 2020-03-17 | End: 2020-03-17

## 2020-03-17 RX ORDER — ENOXAPARIN SODIUM 100 MG/ML
40 INJECTION SUBCUTANEOUS EVERY 24 HOURS
Status: DISCONTINUED | OUTPATIENT
Start: 2020-03-18 | End: 2020-03-24 | Stop reason: HOSPADM

## 2020-03-17 RX ADMIN — ALBUTEROL SULFATE 4 PUFF: 90 AEROSOL, METERED RESPIRATORY (INHALATION) at 09:03

## 2020-03-17 RX ADMIN — ALBUTEROL SULFATE 4 PUFF: 90 AEROSOL, METERED RESPIRATORY (INHALATION) at 02:03

## 2020-03-17 RX ADMIN — TIOTROPIUM BROMIDE 18 MCG: 18 CAPSULE ORAL; RESPIRATORY (INHALATION) at 09:03

## 2020-03-17 RX ADMIN — FLUTICASONE FUROATE AND VILANTEROL TRIFENATATE 1 PUFF: 100; 25 POWDER RESPIRATORY (INHALATION) at 09:03

## 2020-03-17 RX ADMIN — NICOTINE 1 PATCH: 14 PATCH TRANSDERMAL at 10:03

## 2020-03-17 RX ADMIN — LISINOPRIL 20 MG: 20 TABLET ORAL at 10:03

## 2020-03-17 RX ADMIN — LEVOFLOXACIN 750 MG: 750 TABLET, FILM COATED ORAL at 10:03

## 2020-03-17 NOTE — PROGRESS NOTES
Hospital Medicine  Progress Note  [facility]      Patient Name: Kt Lutz  MRN:  [id number]  Hospital Medicine Team: [facility] HOSP MED V Veronica Reid MD  Date of Admission:  3/12/2020     Length of Stay:  LOS: 4 days       Principal Problem:  Suspected Covid-19 Virus Infection      HPI:  Kt Lutz is a 54M with a PMHx of tobacco abuse (current active smoker/40 pack year hx), COPD (recently diagnosed on hospitalization 3 weeks ago), alcohol/drug abuse (in remission) who presents to emergency department of [facility] on 03/12/2020 for with shortness of breath, malaise and dry cough for 2 weeks. Symptoms progressive since diagnosis. He denies fevers, chills, but does report headache.     Pt was admitted to Hospital Medicine on 03/12/2020 for COPD exacerbation. On admission, oxygen saturation in low 80s and ABG with pCO2 in low 70s and patient initiated on BiPAP, Q4 nebs, vanc/zosyn, methylprednisolone, Breo and Spiriva. On and off BiPAP. pCO2 low 90s the next morning. Discussed need to remain on BiPAP today and BiPAP settings increased to 20/5.  Despite orders for continuous BiPAP, patient intermittently removing BiPAP to go outside and smoke a cigarette.  PCO2 remains elevated.  Pulmonology consult given patient's poor response to BiPAP efforts.      Despite intermittent BiPAP for the past 36 hours the pt's PCO2 remains elevated. Mild interstitial edema versus pneumonia on CXR. Critical Care to move pt to the ICU for airway watch and COVID testing.      Hospital/ICU Course:  Pt was admitted to MICU with continuous BiPAP- sating well on 30% 20/5- pt is being tested for Covid, stable to step down to the floor.    Interval History:     Interview conducted telephonically.  Stepped down to  3/16, doing well, no supplemental oxygen. No cough, feels like nicotine patch is working well, no cravings. No major complaints right now. Really wants a coca cola.     Review of  "Systems:  Respiratory: no cough, dyspnea  Cardiovascular: no CP, palpitations    Inpatient Medications:    Current Facility-Administered Medications:     albuterol inhaler 4 puff, 4 puff, Inhalation, Q6H WAKE, Veronica Reid MD    dextrose 10% (D10W) Bolus, 12.5 g, Intravenous, PRN, Behram Khan, MD    dextrose 10% (D10W) Bolus, 25 g, Intravenous, PRN, Behram Khan, MD    enoxaparin injection 40 mg, 40 mg, Subcutaneous, Daily, Behram Khan, MD, 40 mg at 03/15/20 1748    [START ON 3/17/2020] fluticasone furoate-vilanteroL 100-25 mcg/dose diskus inhaler 1 puff, 1 puff, Inhalation, Daily, Veronica Reid MD    glucagon (human recombinant) injection 1 mg, 1 mg, Intramuscular, PRN, Behram Khan, MD    glucose chewable tablet 16 g, 16 g, Oral, PRN, Behram Khan, MD    glucose chewable tablet 24 g, 24 g, Oral, PRN, Behram Khan, MD    levoFLOXacin tablet 750 mg, 750 mg, Oral, Daily, Veronica Reid MD, 750 mg at 03/16/20 0822    lisinopriL tablet 20 mg, 20 mg, Oral, Daily, Behram Khan, MD, 20 mg at 03/16/20 0822    nicotine 14 mg/24 hr 1 patch, 1 patch, Transdermal, Daily, Kwan Pool MD, 1 patch at 03/16/20 0823    sodium chloride 0.9% flush 3 mL, 3 mL, Intravenous, PRN, Behram Khan, MD    [START ON 3/17/2020] tiotropium inhalation capsule 18 mcg, 18 mcg, Inhalation, Daily, Veronica Reid MD    traMADoL tablet 50 mg, 50 mg, Oral, Q6H PRN, Bharathi Loredo MD, 50 mg at 03/16/20 0823      Physical Exam:    Intake/Output Summary (Last 24 hours) at 3/16/2020 2031  Last data filed at 3/16/2020 0900  Gross per 24 hour   Intake 240 ml   Output 1100 ml   Net -860 ml     Wt Readings from Last 3 Encounters:   03/14/20 113.9 kg (251 lb 1.7 oz)   02/04/20 106.3 kg (234 lb 5.6 oz)       /81 (Patient Position: Sitting)   Pulse 92   Temp 97 °F (36.1 °C) (Oral)   Resp 18   Ht 5' 9" (1.753 m)   Wt 113.9 kg (251 lb 1.7 oz)   SpO2 (!) 93%   BMI 37.08 kg/m²     Deferred    Laboratory:    Recent Labs "   Lab 03/14/20  0319 03/15/20  0435 03/16/20  0353   WBC 11.15 9.77 10.39   HGB 13.3* 13.3* 13.7*   HCT 47.1 46.1 47.0    266 322     Recent Labs   Lab 03/14/20  0319 03/15/20  0435 03/16/20  0353    143 141   K 4.3 4.1 4.2    102 101   CO2 30* 34* 30*   BUN 14 17 16   CREATININE 0.7 0.7 0.8   GLU 93 96 87   CALCIUM 7.9* 8.0* 8.3*   MG 2.3 2.1 2.1   PHOS 3.0 3.8 5.1*     Recent Labs   Lab 03/12/20  1738  03/14/20  0319 03/15/20  0435 03/16/20  0353   ALKPHOS  --    < > 66 57 54*   ALT  --    < > 33 26 21   AST  --    < > 17 13 11   ALBUMIN  --    < > 2.9* 2.8* 2.9*   PROT  --    < > 6.0 5.8* 6.0   BILITOT  --    < > 0.1 0.2 0.2   INR 1.0  --   --   --   --     < > = values in this interval not displayed.      No results for input(s): POCTGLUCOSE in the last 168 hours.  No results for input(s): CPK, CPKMB, MB, TROPONINI in the last 72 hours.    No results for input(s): LACTATE in the last 72 hours.     No results for input(s): POCTGLUCOSE in the last 168 hours.  Lab Results   Component Value Date    HGBA1C 6.0 (H) 03/12/2020       Microbiology:  Microbiology Results (last 7 days)     Procedure Component Value Units Date/Time    Blood culture [[id number]] Collected:  03/12/20 1738    Order Status:  Completed Specimen:  Blood from Peripheral, Hand, Right Updated:  03/16/20 2012     Blood Culture, Routine No Growth to date      No Growth to date      No Growth to date      No Growth to date      No Growth to date    Blood culture [[id number]] Collected:  03/12/20 1738    Order Status:  Completed Specimen:  Blood from Peripheral, Hand, Left Updated:  03/16/20 2012     Blood Culture, Routine No Growth to date      No Growth to date      No Growth to date      No Growth to date      No Growth to date    Respiratory Infection Panel (PCR), Nasopharyngeal [[id number]] Collected:  03/14/20 1616    Order Status:  Completed Specimen:  Nasopharyngeal Swab Updated:  03/15/20 0013     Respiratory Infection Panel  Source NP Swab     Adenovirus Not Detected     Coronavirus 229E, Common Cold Virus Not Detected     Coronavirus HKU1, Common Cold Virus Not Detected     Coronavirus NL63, Common Cold Virus Not Detected     Coronavirus OC43, Common Cold Virus Not Detected     Comment: The Coronavirus strains detected in this test cause the common cold.  These strains are not the COVID-19 (novel Coronavirus)strain   associated with the respiratory disease outbreak.          Human Metapneumovirus Not Detected     Human Rhinovirus/Enterovirus Not Detected     Influenza A (subtypes H1, H1-2009,H3) Not Detected     Influenza B Not Detected     Parainfluenza Virus 1 Not Detected     Parainfluenza Virus 2 Not Detected     Parainfluenza Virus 3 Not Detected     Parainfluenza Virus 4 Not Detected     Respiratory Syncytial Virus Not Detected     Bordetella Parapertussis ([id number]) Not Detected     Bordetella pertussis (ptxP) Not Detected     Chlamydia pneumoniae Not Detected     Mycoplasma pneumoniae Not Detected     Comment: Respiratory Infection Panel testing performed by Multiplex PCR.       Narrative:       For all other respiratory sources, order [id number] -  Respiratory Viral Panel by PCR    Culture, Respiratory [[id number]] Collected:  03/12/20 1534    Order Status:  Completed Specimen:  Respiratory from Sputum, Expectorated Updated:  03/14/20 0959     Respiratory Culture Normal respiratory eric      No S aureus or Pseudomonas isolated.     Gram Stain (Respiratory) <10 epithelial cells per low power field.     Gram Stain (Respiratory) Rare WBC's     Gram Stain (Respiratory) Many Gram positive cocci     Gram Stain (Respiratory) Few Gram positive rods     Gram Stain (Respiratory) Rare Gram negative rods    Culture, Respiratory with Gram Stain [[id number]] Collected:  03/13/20 1244    Order Status:  Sent Specimen:  Respiratory from Sputum, Expectorated Updated:  03/13/20 1245    Respiratory Infection Panel (PCR), Nasopharyngeal [[id number]]  Collected:  03/13/20 1244    Order Status:  Sent Specimen:  Nasopharyngeal Swab Updated:  03/13/20 1244    Respiratory Infection Panel (PCR), Nasopharyngeal [[id number]] Collected:  03/12/20 0900    Order Status:  Completed Specimen:  Nasopharyngeal Swab Updated:  03/12/20 2243     Respiratory Infection Panel Source NP Swab     Adenovirus Not Detected     Coronavirus 229E, Common Cold Virus Not Detected     Coronavirus HKU1, Common Cold Virus Not Detected     Coronavirus NL63, Common Cold Virus Not Detected     Coronavirus OC43, Common Cold Virus Not Detected     Comment: The Coronavirus strains detected in this test cause the common cold.  These strains are not the COVID-19 (novel Coronavirus)strain   associated with the respiratory disease outbreak.          Human Metapneumovirus Not Detected     Human Rhinovirus/Enterovirus Not Detected     Influenza A (subtypes H1, H1-2009,H3) Not Detected     Influenza B Not Detected     Parainfluenza Virus 1 Not Detected     Parainfluenza Virus 2 Not Detected     Parainfluenza Virus 3 Not Detected     Parainfluenza Virus 4 Not Detected     Respiratory Syncytial Virus Not Detected     Bordetella Parapertussis ([id number]) Not Detected     Bordetella pertussis (ptxP) Not Detected     Chlamydia pneumoniae Not Detected     Mycoplasma pneumoniae Not Detected     Comment: Respiratory Infection Panel testing performed by Multiplex PCR.       Narrative:       For all other respiratory sources, order [id number] -  [facility] Vir[facility] Panel [facility] PCR ([facility])    Sputum gram stain [[id number]] Collected:  03/12/20 1534    Order Status:  Canceled Specimen:  Sputum from Mouth     Influenza A & B by Molecular [[id number]] Collected:  03/12/20 0900    Order Status:  Completed Specimen:  Nasopharyngeal Swab Updated:  03/12/20 1003     Influenza A, Molecular Negative     Influenza B, Molecular Negative     Flu A & B Source Nasal swab            Imaging  ECG Results          EKG 12-lead (Final result)  Result  time 03/13/20 08:50:48    Final result by Interface, Lab In [facility] (03/13/20 08:50:48)                 Narrative:    Test Reason : R06.02,    Vent. Rate : 109 BPM     Atrial Rate : 109 BPM     P-R Int : 118 ms          QRS Dur : 082 ms      QT Int : 334 ms       P-R-T Axes : 076 077 058 degrees     QTc Int : 449 ms    Sinus tachycardia  Otherwise normal ECG  When compared with ECG of 04-FEB-2020 10:23,  No significant change was found  Confirmed by Ovi PIERRE, Karoline Méndez ([id number]) on 3/13/2020 8:50:41 AM    Referred By: NATHEN   SELF           Confirmed By:Karoline Dior MD                              No results found for this or any previous visit.    X-Ray Chest AP Portable  Narrative: EXAMINATION:  XR CHEST AP PORTABLE    CLINICAL HISTORY:  CHF;    FINDINGS:  Heart size is normal.  There is right basal consolidation versus atelectasis and mild interstitial edema versus pneumonia.  Heart size is upper normal.  Impression: Mild interstitial edema versus pneumonia.  A right lower or middle lobe pneumonia can not be excluded.    Electronically signed by: Kavin Scott MD  Date:    03/12/2020  Time:    09:18        Assessment and Plan:    Active Hospital Problems    Diagnosis  POA    *Suspected Covid-19 Virus Infection [R68.89]  Yes    COPD exacerbation [J44.1]  Yes    Acute respiratory failure with hypoxia and hypercapnia [J96.01, J96.02]  Yes    COPD with acute exacerbation [J44.1]  Yes    Essential hypertension [I10]  Yes    Tobacco abuse [Z72.0]  Yes      Resolved Hospital Problems   No resolved problems to display.         Person under investigation for COVID-19  Acute respiratory failure with hypoxemia and hypercapnia  - COVID-19 testing sent.  - Infection Control notified  - Isolation:   - Airborne and Droplet Precautions  - N95 masks must be fit tested, wear eye protection  - 20 second hand hygiene   - Limit visitors per hospital policy  - Diagnostics (leukopenia, hyponatremia,  hyperferritinemia, elevated troponin, elevated d-dimer, age, and comorbidities are significant predictors of poor clinical outcome)   - CBC, CMP, d-dimer, ferritin, troponin, CRP, LDH, Procalcitonin   - ECG   - rapid Flu neg   - RIP neg   - Legionella antigen   - Blood culture x2   - Portable CXR -? PNA   - UA and culture  - Management:   - Supplemental O2 to maintain SpO2 >92%   - Telemetry & Continuous Pulse Ox   - albuterol INHALER PRN (avoid nebulization of secretions)   - No BiPAP to avoid aerosolization (including home BiPAP)   - No steroids unless septic shock due to increased viral replication   - fluid sparing resuscitation   - Empiric antibiotics per likely source & patient allergies    - CAP: azithromycin & ceftriaxone    Tobacco use disorder  - nicotine patch    TRACIE on BiPAP  Avoid at this time due to not being in a negative pressure room  Not on home O2    Patient's chronic/stable medical conditions noted in the progress note above will be managed with the patient's home medications as tolerated.       Veronica Reid M.D., M.P.H.  Department of Hospital Medicine  [facility]  (pager) [phone] (spect) [phone]

## 2020-03-17 NOTE — PLAN OF CARE
03/17/20 0911   Post-Acute Status   Post-Acute Authorization Other   Other Status No Post-Acute Service Needs

## 2020-03-18 PROCEDURE — 27000221 HC OXYGEN, UP TO 24 HOURS

## 2020-03-18 PROCEDURE — S4991 NICOTINE PATCH NONLEGEND: HCPCS | Performed by: STUDENT IN AN ORGANIZED HEALTH CARE EDUCATION/TRAINING PROGRAM

## 2020-03-18 PROCEDURE — 94640 AIRWAY INHALATION TREATMENT: CPT

## 2020-03-18 PROCEDURE — 25000242 PHARM REV CODE 250 ALT 637 W/ HCPCS: Performed by: INTERNAL MEDICINE

## 2020-03-18 PROCEDURE — 11000001 HC ACUTE MED/SURG PRIVATE ROOM

## 2020-03-18 PROCEDURE — 25000003 PHARM REV CODE 250: Performed by: STUDENT IN AN ORGANIZED HEALTH CARE EDUCATION/TRAINING PROGRAM

## 2020-03-18 PROCEDURE — 94761 N-INVAS EAR/PLS OXIMETRY MLT: CPT

## 2020-03-18 PROCEDURE — 63600175 PHARM REV CODE 636 W HCPCS: Performed by: INTERNAL MEDICINE

## 2020-03-18 PROCEDURE — 99900035 HC TECH TIME PER 15 MIN (STAT)

## 2020-03-18 PROCEDURE — 25000003 PHARM REV CODE 250: Performed by: INTERNAL MEDICINE

## 2020-03-18 RX ADMIN — TIOTROPIUM BROMIDE 18 MCG: 18 CAPSULE ORAL; RESPIRATORY (INHALATION) at 09:03

## 2020-03-18 RX ADMIN — ENOXAPARIN SODIUM 40 MG: 100 INJECTION SUBCUTANEOUS at 09:03

## 2020-03-18 RX ADMIN — NICOTINE 1 PATCH: 14 PATCH TRANSDERMAL at 09:03

## 2020-03-18 RX ADMIN — ALBUTEROL SULFATE 4 PUFF: 90 AEROSOL, METERED RESPIRATORY (INHALATION) at 02:03

## 2020-03-18 RX ADMIN — ALBUTEROL SULFATE 4 PUFF: 90 AEROSOL, METERED RESPIRATORY (INHALATION) at 08:03

## 2020-03-18 RX ADMIN — FLUTICASONE FUROATE AND VILANTEROL TRIFENATATE 1 PUFF: 100; 25 POWDER RESPIRATORY (INHALATION) at 08:03

## 2020-03-18 RX ADMIN — TRAMADOL HYDROCHLORIDE 50 MG: 50 TABLET, FILM COATED ORAL at 09:03

## 2020-03-18 RX ADMIN — LEVOFLOXACIN 750 MG: 750 TABLET, FILM COATED ORAL at 09:03

## 2020-03-18 NOTE — PROGRESS NOTES
Hospital Medicine  Progress Note  [facility]      Patient Name: Kt Lutz  MRN:  [id number]  Hospital Medicine Team: [facility]OS[facility] MED V Veronica Reid MD  Date of Admission:  3/12/2020     Length of Stay:  LOS: 5 days       Principal Problem:  Suspected Covid-19 Virus Infection      HPI:  Kt Lutz is a 54M with a PMHx of tobacco abuse (current active smoker/40 pack year hx), COPD (recently diagnosed on hospitalization 3 weeks ago), alcohol/drug abuse (in remission) who presents to emergency department of [facility] on 03/12/2020 for with shortness of breath, malaise and dry cough for 2 weeks. Symptoms progressive since diagnosis. He denies fevers, chills, but does report headache.     Pt was admitted to Hospital Medicine on 03/12/2020 for COPD exacerbation. On admission, oxygen saturation in low 80s and ABG with pCO2 in low 70s and patient initiated on BiPAP, Q4 nebs, vanc/zosyn, methylprednisolone, Breo and Spiriva. On and off BiPAP. pCO2 low 90s the next morning. Discussed need to remain on BiPAP today and BiPAP settings increased to 20/5.  Despite orders for continuous BiPAP, patient intermittently removing BiPAP to go outside and smoke a cigarette.  PCO2 remains elevated.  Pulmonology consult given patient's poor response to BiPAP efforts.      Despite intermittent BiPAP for the past 36 hours the pt's PCO2 remains elevated. Mild interstitial edema versus pneumonia on CXR. Critical Care to move pt to the ICU for airway watch and COVID testing.      Hospital/ICU Course:  Pt was admitted to MICU with continuous BiPAP- sating well on 30% 20/5- pt is being tested for Covid, stable to step down to the floor. Stepped down to HM 3/16, doing well, no supplemental oxygen. No cough, feels like nicotine patch is working well, no cravings. No major complaints right now. Really wants a coca cola.     Interval History:     Doing well on my exam, brought him a Coca Cola. Afebrile. Lives  at a group home run by the [facility], shares a room with 5 other older men. Medically ready for discharge but unsafe quarantine situation.       Review of Systems:  Respiratory: no cough, dyspnea  Cardiovascular: no CP, palpitations    Inpatient Medications:    Current Facility-Administered Medications:     albuterol inhaler 4 puff, 4 puff, Inhalation, Q6H WAKE, Veronica Reid MD, 4 puff at 03/17/20 1456    dextrose 10% (D10W) Bolus, 12.5 g, Intravenous, PRN, Behram Khan, MD    dextrose 10% (D10W) Bolus, 25 g, Intravenous, PRN, Behram Khan, MD    [START ON 3/18/2020] enoxaparin injection 40 mg, 40 mg, Subcutaneous, Daily, Veronica Reid MD    fluticasone furoate-vilanteroL 100-25 mcg/dose diskus inhaler 1 puff, 1 puff, Inhalation, Daily, Veronica Reid MD, 1 puff at 03/17/20 0945    glucagon (human recombinant) injection 1 mg, 1 mg, Intramuscular, PRN, Behram Khan, MD    glucose chewable tablet 16 g, 16 g, Oral, PRN, Behram Khan, MD    glucose chewable tablet 24 g, 24 g, Oral, PRN, Behram Khan, MD    levoFLOXacin tablet 750 mg, 750 mg, Oral, Daily, Veronica Reid MD, 750 mg at 03/17/20 1033    lisinopriL tablet 20 mg, 20 mg, Oral, Daily, Behram Khan, MD, 20 mg at 03/17/20 1033    nicotine 14 mg/24 hr 1 patch, 1 patch, Transdermal, Daily, Kwan Pool MD, 1 patch at 03/17/20 1035    sodium chloride 0.9% flush 3 mL, 3 mL, Intravenous, PRN, Behram Khan, MD    tiotropium inhalation capsule 18 mcg, 18 mcg, Inhalation, Daily, Veronica Reid MD, 18 mcg at 03/17/20 0946    traMADoL tablet 50 mg, 50 mg, Oral, Q6H PRN, Bharathi Loredo MD, 50 mg at 03/16/20 0823      Physical Exam:    Intake/Output Summary (Last 24 hours) at 3/17/2020 2026  Last data filed at 3/17/2020 1400  Gross per 24 hour   Intake 360 ml   Output --   Net 360 ml     Wt Readings from Last 3 Encounters:   03/14/20 113.9 kg (251 lb 1.7 oz)   02/04/20 106.3 kg (234 lb 5.6 oz)       /75 (Patient Position: Lying)   " Pulse 75   Temp 98.4 °F (36.9 °C) (Oral)   Resp 18   Ht 5' 9" (1.753 m)   Wt 113.9 kg (251 lb 1.7 oz)   SpO2 (!) 93%   BMI 37.08 kg/m²     Gen: sitting up in bed, nad, pleasant  Resp: CTAB, no rales  CV: rrr, no m/r/g    Laboratory:    Recent Labs   Lab 03/15/20  0435 03/16/20  0353 03/17/20  0321   WBC 9.77 10.39 12.39   HGB 13.3* 13.7* 14.7   HCT 46.1 47.0 47.5    322 327     Recent Labs   Lab 03/15/20  0435 03/16/20  0353 03/17/20  0321    141 143   K 4.1 4.2 4.6    101 101   CO2 34* 30* 28   BUN 17 16 14   CREATININE 0.7 0.8 0.8   GLU 96 87 76   CALCIUM 8.0* 8.3* 8.6*   MG 2.1 2.1 2.1   PHOS 3.8 5.1* 4.5     Recent Labs   Lab 03/12/20  1738  03/15/20  0435 03/16/20  0353 03/17/20  0321   ALKPHOS  --    < > 57 54* 58   ALT  --    < > 26 21 22   AST  --    < > 13 11 20   ALBUMIN  --    < > 2.8* 2.9* 3.1*   PROT  --    < > 5.8* 6.0 6.4   BILITOT  --    < > 0.2 0.2 0.3   INR 1.0  --   --   --   --     < > = values in this interval not displayed.      No results for input(s): POCTGLUCOSE in the last 168 hours.  No results for input(s): CPK, CPKMB, MB, TROPONINI in the last 72 hours.    No results for input(s): LACTATE in the last 72 hours.     No results for input(s): POCTGLUCOSE in the last 168 hours.  Lab Results   Component Value Date    HGBA1C 6.0 (H) 03/12/2020       Microbiology:  Microbiology Results (last 7 days)     Procedure Component Value Units Date/Time    Blood culture [[id number]] Collected:  03/12/20 1738    Order Status:  Completed Specimen:  Blood from Peripheral, Hand, Right Updated:  03/17/20 2012     Blood Culture, Routine No growth after 5 days.    Blood culture [[id number]] Collected:  03/12/20 1738    Order Status:  Completed Specimen:  Blood from Peripheral, Hand, Left Updated:  03/17/20 2012     Blood Culture, Routine No growth after 5 days.    Respiratory Infection Panel (PCR), Nasopharyngeal [[id number]] Collected:  03/14/20 1616    Order Status:  Completed " Specimen:  Nasopharyngeal Swab Updated:  03/15/20 0013     Respiratory Infection Panel Source NP Swab     Adenovirus Not Detected     Coronavirus 229E, Common Cold Virus Not Detected     Coronavirus HKU1, Common Cold Virus Not Detected     Coronavirus NL63, Common Cold Virus Not Detected     Coronavirus OC43, Common Cold Virus Not Detected     Comment: The Coronavirus strains detected in this test cause the common cold.  These strains are not the COVID-19 (novel Coronavirus)strain   associated with the respiratory disease outbreak.          Human Metapneumovirus Not Detected     Human Rhinovirus/Enterovirus Not Detected     Influenza A (subtypes H1, H1-2009,H3) Not Detected     Influenza B Not Detected     Parainfluenza Virus 1 Not Detected     Parainfluenza Virus 2 Not Detected     Parainfluenza Virus 3 Not Detected     Parainfluenza Virus 4 Not Detected     Respiratory Syncytial Virus Not Detected     Bordetella Parapertussis (TO8269) Not Detected     Bordetella pertussis (ptxP) Not Detected     Chlamydia pneumoniae Not Detected     Mycoplasma pneumoniae Not Detected     Comment: Respiratory Infection Panel testing performed by Multiplex PCR.       Narrative:       For all other respiratory sources, order [id number] -  Respiratory Viral Panel by PCR    Culture, Respiratory [[id number]] Collected:  03/12/20 1534    Order Status:  Completed Specimen:  Respiratory from Sputum, Expectorated Updated:  03/14/20 0959     Respiratory Culture Normal respiratory eric      No S aureus or Pseudomonas isolated.     Gram Stain (Respiratory) <10 epithelial cells per low power field.     Gram Stain (Respiratory) Rare WBC's     Gram Stain (Respiratory) Many Gram positive cocci     Gram Stain (Respiratory) Few Gram positive rods     Gram Stain (Respiratory) Rare Gram negative rods    Culture, Respiratory with Gram Stain [[id number]] Collected:  03/13/20 1244    Order Status:  Sent Specimen:  Respiratory from Sputum, Expectorated  Updated:  03/13/20 1245    Respiratory Infection Panel (PCR), Nasopharyngeal [[id number]] Collected:  03/13/20 1244    Order Status:  Sent Specimen:  Nasopharyngeal Swab Updated:  03/13/20 1244    Respiratory Infection Panel (PCR), Nasopharyngeal [[id number]] Collected:  03/12/20 0900    Order Status:  Completed Specimen:  Nasopharyngeal Swab Updated:  03/12/20 2243     Respiratory Infection Panel Source NP Swab     Adenovirus Not Detected     Coronavirus 229E, Common Cold Virus Not Detected     Coronavirus HKU1, Common Cold Virus Not Detected     Coronavirus NL63, Common Cold Virus Not Detected     Coronavirus OC43, Common Cold Virus Not Detected     Comment: The Coronavirus strains detected in this test cause the common cold.  These strains are not the COVID-19 (novel Coronavirus)strain   associated with the respiratory disease outbreak.          Human Metapneumovirus Not Detected     Human Rhinovirus/Enterovirus Not Detected     Influenza A (subtypes H1, H1-2009,H3) Not Detected     Influenza B Not Detected     Parainfluenza Virus 1 Not Detected     Parainfluenza Virus 2 Not Detected     Parainfluenza Virus 3 Not Detected     Parainfluenza Virus 4 Not Detected     Respiratory Syncytial Virus Not Detected     Bordetella Parapertussis (BO7065) Not Detected     Bordetella pertussis (ptxP) Not Detected     Chlamydia pneumoniae Not Detected     Mycoplasma pneumoniae Not Detected     Comment: Respiratory Infection Panel testing performed by Multiplex PCR.       Narrative:       For all other respiratory sources, order [id number] -  [facility]    Sputum gram stain [[id number]] Collected:  03/12/20 1534    Order Status:  Canceled Specimen:  Sputum from Mouth     Influenza A & B by Molecular [[id number]] Collected:  03/12/20 0900    Order Status:  Completed Specimen:  Nasopharyngeal Swab Updated:  03/12/20 1003     Influenza A, Molecular Negative     Influenza B, Molecular Negative     Flu A & B  Source Nasal swab            Imaging  ECG Results          EKG 12-lead (Final result)  Result time 03/13/20 08:50:48    Final result by Interface, Lab In [facility] (03/13/20 08:50:48)                 Narrative:    Test Reason : R06.02,    Vent. Rate : 109 BPM     Atrial Rate : 109 BPM     P-R Int : 118 ms          QRS Dur : 082 ms      QT Int : 334 ms       P-R-T Axes : 076 077 058 degrees     QTc Int : 449 ms    Sinus tachycardia  Otherwise normal ECG  When compared with ECG of 04-FEB-2020 10:23,  No significant change was found  Confirmed by Ovi PIERRE, Karoline Méndez ([id number]) on 3/13/2020 8:50:41 AM    Referred By: NATHEN   SELF           Confirmed By:Karoline Dior MD                              No results found for this or any previous visit.    X-Ray Chest AP Portable  Narrative: EXAMINATION:  XR CHEST AP PORTABLE    CLINICAL HISTORY:  CHF;    FINDINGS:  Heart size is normal.  There is right basal consolidation versus atelectasis and mild interstitial edema versus pneumonia.  Heart size is upper normal.  Impression: Mild interstitial edema versus pneumonia.  A right lower or middle lobe pneumonia can not be excluded.    Electronically signed by: Kavin Scott MD  Date:    03/12/2020  Time:    09:18        Assessment and Plan:    Active Hospital Problems    Diagnosis  POA    *Suspected Covid-19 Virus Infection [R68.89]  Yes    COPD exacerbation [J44.1]  Yes    Acute respiratory failure with hypoxia and hypercapnia [J96.01, J96.02]  Yes    COPD with acute exacerbation [J44.1]  Yes    Essential hypertension [I10]  Yes    Tobacco abuse [Z72.0]  Yes      Resolved Hospital Problems   No resolved problems to display.         Person under investigation for COVID-19  Acute respiratory failure with hypoxemia and hypercapnia  - COVID-19 testing sent.  - Infection Control notified  - Isolation:   - Airborne and Droplet Precautions  - N95 masks must be fit tested, wear eye protection  - 20 second hand  hygiene   - Limit visitors per hospital policy  - Diagnostics (leukopenia, hyponatremia, hyperferritinemia, elevated troponin, elevated d-dimer, age, and comorbidities are significant predictors of poor clinical outcome)   - CBC no lymphopenia, CMP WNL, d-dimer, ferritin, troponin, CRP, LDH, Procalcitonin   - ECG   - rapid Flu neg   - RIP neg   - Legionella antigen   - Blood culture x2   - Portable CXR -? PNA   - UA and culture  - Management:   - Supplemental O2 to maintain SpO2 >92%   - Telemetry & Continuous Pulse Ox   - albuterol INHALER PRN (avoid nebulization of secretions)   - No BiPAP to avoid aerosolization (including home BiPAP)   - No steroids unless septic shock due to increased viral replication   - fluid sparing resuscitation   - Empiric antibiotics per likely source & patient allergies    - CAP: azithromycin & ceftriaxone    Tobacco use disorder  - nicotine patch    TRACIE on BiPAP  Avoid at this time due to not being in a negative pressure room  Not on home O2    Patient's chronic/stable medical conditions noted in the progress note above will be managed with the patient's home medications as tolerated.       Veronica Reid M.D., M.P.H.  Department of Hospital Medicine  [facility]  (pager) [phone] (spect) [phone]

## 2020-03-18 NOTE — PLAN OF CARE
Patient condition stable. No complaints of pain/discomfort. Plan of care implemented. Isolation maintained. Safety measures implemented.

## 2020-03-18 NOTE — PROGRESS NOTES
Hospital Medicine  Progress Note  [facility]      Patient Name: Kt Lutz  MRN:  [id number]  Hospital Medicine Team: [facility] V Veronica Reid MD  Date of Admission:  3/12/2020     Length of Stay:  LOS: 6 days       Principal Problem:  Suspected Covid-19 Virus Infection      HPI:  Kt Lutz is a 54M with a PMHx of tobacco abuse (current active smoker/40 pack year hx), COPD (recently diagnosed on hospitalization 3 weeks ago), alcohol/drug abuse (in remission) who presents to emergency department of [facility] on 03/12/2020 for with shortness of breath, malaise and dry cough for 2 weeks. Symptoms progressive since diagnosis. He denies fevers, chills, but does report headache.     Pt was admitted to Hospital Medicine on 03/12/2020 for COPD exacerbation. On admission, oxygen saturation in low 80s and ABG with pCO2 in low 70s and patient initiated on BiPAP, Q4 nebs, vanc/zosyn, methylprednisolone, Breo and Spiriva. On and off BiPAP. pCO2 low 90s the next morning. Discussed need to remain on BiPAP today and BiPAP settings increased to 20/5.  Despite orders for continuous BiPAP, patient intermittently removing BiPAP to go outside and smoke a cigarette.  PCO2 remains elevated.  Pulmonology consult given patient's poor response to BiPAP efforts.      Despite intermittent BiPAP for the past 36 hours the pt's PCO2 remains elevated. Mild interstitial edema versus pneumonia on CXR. Critical Care to move pt to the ICU for airway watch and COVID testing.      Hospital/ICU Course:  Pt was admitted to MICU with continuous BiPAP- sating well on 30% 20/5- pt is being tested for Covid, stable to step down to the floor. Stepped down to HM 3/16, doing well, no supplemental oxygen. No cough, feels like nicotine patch is working well, no cravings. No major complaints right now. Really wants a coca cola.     3/17 Doing well on my exam, brought him a Coca Cola. Afebrile. Lives at a group home  run by the [employer], shares a room with 5 other older men. Medically ready for discharge but unsafe quarantine situation.       Interval History:     Still feels well, no cough or dyspnea. But remains on 3L NC. Will trial tapering his supplemental O2, but currently SpO2 is 92%.     Review of Systems:  Respiratory: no cough, dyspnea  Cardiovascular: no CP, palpitations    Inpatient Medications:    Current Facility-Administered Medications:     albuterol inhaler 4 puff, 4 puff, Inhalation, Q6H WAKE, Veronica Reid MD, 4 puff at 03/18/20 1416    dextrose 10% (D10W) Bolus, 12.5 g, Intravenous, PRN, Behram Khan, MD    dextrose 10% (D10W) Bolus, 25 g, Intravenous, PRN, Behram Khan, MD    enoxaparin injection 40 mg, 40 mg, Subcutaneous, Daily, Veronica Reid MD, 40 mg at 03/18/20 0929    fluticasone furoate-vilanteroL 100-25 mcg/dose diskus inhaler 1 puff, 1 puff, Inhalation, Daily, Veronica Reid MD, 1 puff at 03/18/20 0824    glucagon (human recombinant) injection 1 mg, 1 mg, Intramuscular, PRN, Behram Khan, MD    glucose chewable tablet 16 g, 16 g, Oral, PRN, Behram Khan, MD    glucose chewable tablet 24 g, 24 g, Oral, PRN, Behram Khan, MD    lisinopriL tablet 20 mg, 20 mg, Oral, Daily, Behram Khan, MD, 20 mg at 03/17/20 1033    nicotine 14 mg/24 hr 1 patch, 1 patch, Transdermal, Daily, Kwan Pool MD, 1 patch at 03/18/20 0930    sodium chloride 0.9% flush 3 mL, 3 mL, Intravenous, PRN, Behram Khan, MD    tiotropium inhalation capsule 18 mcg, 18 mcg, Inhalation, Daily, Veronica Reid MD, 18 mcg at 03/18/20 0935    traMADoL tablet 50 mg, 50 mg, Oral, Q6H PRN, Bharathi Loredo MD, 50 mg at 03/16/20 0823      Physical Exam:  No intake or output data in the 24 hours ending 03/18/20 1441  Wt Readings from Last 3 Encounters:   03/14/20 113.9 kg (251 lb 1.7 oz)   02/04/20 106.3 kg (234 lb 5.6 oz)       BP (!) 140/81 (BP Location: Left arm, Patient Position: Lying)   Pulse 80   Temp 98.6  "°F (37 °C) (Oral)   Resp 16   Ht 5' 9" (1.753 m)   Wt 113.9 kg (251 lb 1.7 oz)   SpO2 (!) 94%   BMI 37.08 kg/m²     Gen: sitting up in bed, nad, pleasant  Resp: CTAB, no rales  CV: rrr, no m/r/g    Laboratory:    Recent Labs   Lab 03/15/20  0435 03/16/20  0353 03/17/20  0321   WBC 9.77 10.39 12.39   HGB 13.3* 13.7* 14.7   HCT 46.1 47.0 47.5    322 327     Recent Labs   Lab 03/15/20  0435 03/16/20  0353 03/17/20  0321    141 143   K 4.1 4.2 4.6    101 101   CO2 34* 30* 28   BUN 17 16 14   CREATININE 0.7 0.8 0.8   GLU 96 87 76   CALCIUM 8.0* 8.3* 8.6*   MG 2.1 2.1 2.1   PHOS 3.8 5.1* 4.5     Recent Labs   Lab 03/12/20  1738  03/15/20  0435 03/16/20  0353 03/17/20  0321   ALKPHOS  --    < > 57 54* 58   ALT  --    < > 26 21 22   AST  --    < > 13 11 20   ALBUMIN  --    < > 2.8* 2.9* 3.1*   PROT  --    < > 5.8* 6.0 6.4   BILITOT  --    < > 0.2 0.2 0.3   INR 1.0  --   --   --   --     < > = values in this interval not displayed.      No results for input(s): POCTGLUCOSE in the last 168 hours.  No results for input(s): CPK, CPKMB, MB, TROPONINI in the last 72 hours.    No results for input(s): LACTATE in the last 72 hours.     No results for input(s): POCTGLUCOSE in the last 168 hours.  Lab Results   Component Value Date    HGBA1C 6.0 (H) 03/12/2020       Microbiology:  Microbiology Results (last 7 days)     Procedure Component Value Units Date/Time    Blood culture [[id number]] Collected:  03/12/20 1738    Order Status:  Completed Specimen:  Blood from Peripheral, Hand, Right Updated:  03/17/20 2012     Blood Culture, Routine No growth after 5 days.    Blood culture [[id number]] Collected:  03/12/20 1738    Order Status:  Completed Specimen:  Blood from Peripheral, Hand, Left Updated:  03/17/20 2012     Blood Culture, Routine No growth after 5 days.    Respiratory Infection Panel (PCR), Nasopharyngeal [[id number]] Collected:  03/14/20 1616    Order Status:  Completed Specimen:  Nasopharyngeal Swab " Updated:  03/15/20 0013     Respiratory Infection Panel Source NP Swab     Adenovirus Not Detected     Coronavirus 229E, Common Cold Virus Not Detected     Coronavirus HKU1, Common Cold Virus Not Detected     Coronavirus NL63, Common Cold Virus Not Detected     Coronavirus OC43, Common Cold Virus Not Detected     Comment: The Coronavirus strains detected in this test cause the common cold.  These strains are not the COVID-19 (novel Coronavirus)strain   associated with the respiratory disease outbreak.          Human Metapneumovirus Not Detected     Human Rhinovirus/Enterovirus Not Detected     Influenza A (subtypes H1, H1-2009,H3) Not Detected     Influenza B Not Detected     Parainfluenza Virus 1 Not Detected     Parainfluenza Virus 2 Not Detected     Parainfluenza Virus 3 Not Detected     Parainfluenza Virus 4 Not Detected     Respiratory Syncytial Virus Not Detected     Bordetella Parapertussis (YB4482) Not Detected     Bordetella pertussis (ptxP) Not Detected     Chlamydia pneumoniae Not Detected     Mycoplasma pneumoniae Not Detected     Comment: Respiratory Infection Panel testing performed by Multiplex PCR.       Narrative:       For all other respiratory sources, order [id number] -  Respiratory Viral Panel by PCR    Culture, Respiratory [[id number]] Collected:  03/12/20 1534    Order Status:  Completed Specimen:  Respiratory from Sputum, Expectorated Updated:  03/14/20 0959     Respiratory Culture Normal respiratory eric      No S aureus or Pseudomonas isolated.     Gram Stain (Respiratory) <10 epithelial cells per low power field.     Gram Stain (Respiratory) Rare WBC's     Gram Stain (Respiratory) Many Gram positive cocci     Gram Stain (Respiratory) Few Gram positive rods     Gram Stain (Respiratory) Rare Gram negative rods    Culture, Respiratory with Gram Stain [[id number]] Collected:  03/13/20 1244    Order Status:  Sent Specimen:  Respiratory from Sputum, Expectorated Updated:  03/13/20 1240     Respiratory Infection Panel (PCR), Nasopharyngeal [[id number]] Collected:  03/13/20 1244    Order Status:  Sent Specimen:  Nasopharyngeal Swab Updated:  03/13/20 1244    Respiratory Infection Panel (PCR), Nasopharyngeal [[id number]] Collected:  03/12/20 0900    Order Status:  Completed Specimen:  Nasopharyngeal Swab Updated:  03/12/20 2243     Respiratory Infection Panel Source NP Swab     Adenovirus Not Detected     Coronavirus 229E, Common Cold Virus Not Detected     Coronavirus HKU1, Common Cold Virus Not Detected     Coronavirus NL63, Common Cold Virus Not Detected     Coronavirus OC43, Common Cold Virus Not Detected     Comment: The Coronavirus strains detected in this test cause the common cold.  These strains are not the COVID-19 (novel Coronavirus)strain   associated with the respiratory disease outbreak.          Human Metapneumovirus Not Detected     Human Rhinovirus/Enterovirus Not Detected     Influenza A (subtypes H1, H1-2009,H3) Not Detected     Influenza B Not Detected     Parainfluenza Virus 1 Not Detected     Parainfluenza Virus 2 Not Detected     Parainfluenza Virus 3 Not Detected     Parainfluenza Virus 4 Not Detected     Respiratory Syncytial Virus Not Detected     Bordetella Parapertussis (ND1843) Not Detected     Bordetella pertussis (ptxP) Not Detected     Chlamydia pneumoniae Not Detected     Mycoplasma pneumoniae Not Detected     Comment: Respiratory Infection Panel testing performed by Multiplex PCR.       Narrative:       For all other respiratory sources, order [id number] -  Respiratory Viral Panel by PCR (RSPFA)    Sputum gram stain [[id number]] Collected:  03/12/20 1534    Order Status:  Canceled Specimen:  Sputum from Mouth     Influenza A & B by Molecular [[id number]] Collected:  03/12/20 0900    Order Status:  Completed Specimen:  Nasopharyngeal Swab Updated:  03/12/20 1003     Influenza A, Molecular Negative     Influenza B, Molecular Negative     Flu A & B Source Nasal swab             Imaging  ECG Results          EKG 12-lead (Final result)  Result time 03/13/20 08:50:48    [facility] (03/13/20 08:50:48)                 Narrative:    Test Reason : R06.02,    Vent. Rate : 109 BPM     Atrial Rate : 109 BPM     P-R Int : 118 ms          QRS Dur : 082 ms      QT Int : 334 ms       P-R-T Axes : 076 077 058 degrees     QTc Int : 449 ms    Sinus tachycardia  Otherwise normal ECG  When compared with ECG of 04-FEB-2020 10:23,  No significant change was found  Confirmed by Ovi PIERRE, Karoline Méndez ([id number]) on 3/13/2020 8:50:41 AM    Referred By: [email or web address]   SELF           Confirmed By:Karoline Dior MD                             EKG 12-LEAD (Final result)  Result time 03/18/20 09:58:28    Final result by Unknown User (03/18/20 09:58:28)                                  No results found for this or any previous visit.    X-Ray Chest AP Portable  Narrative: EXAMINATION:  XR CHEST AP PORTABLE    CLINICAL HISTORY:  CHF;    FINDINGS:  Heart size is normal.  There is right basal consolidation versus atelectasis and mild interstitial edema versus pneumonia.  Heart size is upper normal.  Impression: Mild interstitial edema versus pneumonia.  A right lower or middle lobe pneumonia can not be excluded.    Electronically signed by: Kavin Scott MD  Date:    03/12/2020  Time:    09:18        Assessment and Plan:    Active Hospital Problems    Diagnosis  POA    *Suspected Covid-19 Virus Infection [R68.89]  Yes    COPD exacerbation [J44.1]  Yes    Acute respiratory failure with hypoxia and hypercapnia [J96.01, J96.02]  Yes    COPD with acute exacerbation [J44.1]  Yes    Essential hypertension [I10]  Yes    Tobacco abuse [Z72.0]  Yes      Resolved Hospital Problems   No resolved problems to display.         Person under investigation for COVID-19  Acute respiratory failure with hypoxemia and hypercapnia  - COVID-19 testing sent.  - Infection Control notified  -  Isolation:   - Airborne and Droplet Precautions  - N95 masks must be fit tested, wear eye protection  - 20 second hand hygiene   - Limit visitors per hospital policy  - Diagnostics (leukopenia, hyponatremia, hyperferritinemia, elevated troponin, elevated d-dimer, age, and comorbidities are significant predictors of poor clinical outcome)   - CBC no lymphopenia, CMP WNL, d-dimer, ferritin, troponin, CRP, LDH, Procalcitonin   - ECG   - rapid Flu neg   - RIP neg   - Legionella antigen   - Blood culture x2   - Portable CXR -? PNA   - UA and culture  - Management:   - Supplemental O2 to maintain SpO2 >92%, taper as able   - Telemetry & Continuous Pulse Ox   - albuterol INHALER PRN (avoid nebulization of secretions)   - No BiPAP to avoid aerosolization (including home BiPAP)   - No steroids unless septic shock due to increased viral replication   - fluid sparing resuscitation   - Empiric antibiotics per likely source & patient allergies    - CAP: azithromycin & ceftriaxone    Tobacco use disorder  - nicotine patch    TRACIE on BiPAP  Avoid at this time due to not being in a negative pressure room  Not on home O2    Dispo  Homeless, staying at [facility].   - SW contacted    Patient's chronic/stable medical conditions noted in the progress note above will be managed with the patient's home medications as tolerated.       Veronica Reid M.D., M.P.H.  Department of Hospital Medicine  [facility]  (pager) [phone] (spect) [phone]

## 2020-03-18 NOTE — PLAN OF CARE
Problem: Adult Inpatient Plan of Care  Goal: Plan of Care Review  Outcome: Ongoing, Progressing  Goal: Patient-Specific Goal (Individualization)  Outcome: Ongoing, Progressing  Goal: Absence of Hospital-Acquired Illness or Injury  Outcome: Ongoing, Progressing  Goal: Optimal Comfort and Wellbeing  Outcome: Ongoing, Progressing  Goal: Readiness for Transition of Care  Outcome: Ongoing, Progressing  Goal: Rounds/Family Conference  Outcome: Ongoing, Progressing     Problem: Fall Injury Risk  Goal: Absence of Fall and Fall-Related Injury  Outcome: Ongoing, Progressing     Problem: Infection  Goal: Infection Symptom Resolution  Outcome: Ongoing, Progressing    Pt remained free from falls and injuries. VSS. No complaints of pain or distress.

## 2020-03-18 NOTE — PLAN OF CARE
Problem: Adult Inpatient Plan of Care  Goal: Plan of Care Review  Outcome: Ongoing, Progressing  Goal: Patient-Specific Goal (Individualization)  Outcome: Ongoing, Progressing  Goal: Absence of Hospital-Acquired Illness or Injury  Outcome: Ongoing, Progressing  Goal: Optimal Comfort and Wellbeing  Outcome: Ongoing, Progressing  Goal: Readiness for Transition of Care  Outcome: Ongoing, Progressing  Goal: Rounds/Family Conference  Outcome: Ongoing, Progressing   AAOx4. POC reviewed with patient. Vitals stable. Afebrile. Medications given as ordered. Tolerated well. No complaints of pain or discomfort. Will continue to monitor.

## 2020-03-18 NOTE — PLAN OF CARE
03/18/20 0837   Post-Acute Status   Post-Acute Authorization Other   Other Status No Post-Acute Service Needs     When Pt is stable, Pt will d.c to the designated [facility] location as Pt was residing at [facility].

## 2020-03-19 LAB
ALBUMIN SERPL BCP-MCNC: 3.2 G/DL (ref 3.5–5.2)
ALP SERPL-CCNC: 63 U/L (ref 55–135)
ALT SERPL W/O P-5'-P-CCNC: 21 U/L (ref 10–44)
ANION GAP SERPL CALC-SCNC: 9 MMOL/L (ref 8–16)
AST SERPL-CCNC: 15 U/L (ref 10–40)
BASOPHILS # BLD AUTO: 0.04 K/UL (ref 0–0.2)
BASOPHILS NFR BLD: 0.4 % (ref 0–1.9)
BILIRUB SERPL-MCNC: 0.5 MG/DL (ref 0.1–1)
BUN SERPL-MCNC: 18 MG/DL (ref 6–20)
CALCIUM SERPL-MCNC: 9 MG/DL (ref 8.7–10.5)
CHLORIDE SERPL-SCNC: 97 MMOL/L (ref 95–110)
CO2 SERPL-SCNC: 34 MMOL/L (ref 23–29)
CREAT SERPL-MCNC: 0.9 MG/DL (ref 0.5–1.4)
DIFFERENTIAL METHOD: ABNORMAL
EOSINOPHIL # BLD AUTO: 0.3 K/UL (ref 0–0.5)
EOSINOPHIL NFR BLD: 2.9 % (ref 0–8)
ERYTHROCYTE [DISTWIDTH] IN BLOOD BY AUTOMATED COUNT: 13.6 % (ref 11.5–14.5)
EST. GFR  (AFRICAN AMERICAN): >60 ML/MIN/1.73 M^2
EST. GFR  (NON AFRICAN AMERICAN): >60 ML/MIN/1.73 M^2
GLUCOSE SERPL-MCNC: 96 MG/DL (ref 70–110)
HCT VFR BLD AUTO: 53.8 % (ref 40–54)
HGB BLD-MCNC: 15.6 G/DL (ref 14–18)
IMM GRANULOCYTES # BLD AUTO: 0.1 K/UL (ref 0–0.04)
IMM GRANULOCYTES NFR BLD AUTO: 1 % (ref 0–0.5)
LYMPHOCYTES # BLD AUTO: 2.5 K/UL (ref 1–4.8)
LYMPHOCYTES NFR BLD: 23.7 % (ref 18–48)
MAGNESIUM SERPL-MCNC: 2.2 MG/DL (ref 1.6–2.6)
MCH RBC QN AUTO: 28.5 PG (ref 27–31)
MCHC RBC AUTO-ENTMCNC: 29 G/DL (ref 32–36)
MCV RBC AUTO: 98 FL (ref 82–98)
MONOCYTES # BLD AUTO: 0.9 K/UL (ref 0.3–1)
MONOCYTES NFR BLD: 8.7 % (ref 4–15)
NEUTROPHILS # BLD AUTO: 6.7 K/UL (ref 1.8–7.7)
NEUTROPHILS NFR BLD: 63.3 % (ref 38–73)
NRBC BLD-RTO: 0 /100 WBC
PHOSPHATE SERPL-MCNC: 4.6 MG/DL (ref 2.7–4.5)
PLATELET # BLD AUTO: 315 K/UL (ref 150–350)
PMV BLD AUTO: 9.5 FL (ref 9.2–12.9)
POTASSIUM SERPL-SCNC: 4.5 MMOL/L (ref 3.5–5.1)
PROT SERPL-MCNC: 6.8 G/DL (ref 6–8.4)
RBC # BLD AUTO: 5.48 M/UL (ref 4.6–6.2)
SODIUM SERPL-SCNC: 140 MMOL/L (ref 136–145)
WBC # BLD AUTO: 10.52 K/UL (ref 3.9–12.7)

## 2020-03-19 PROCEDURE — 85025 COMPLETE CBC W/AUTO DIFF WBC: CPT

## 2020-03-19 PROCEDURE — 99900035 HC TECH TIME PER 15 MIN (STAT)

## 2020-03-19 PROCEDURE — 25000003 PHARM REV CODE 250: Performed by: STUDENT IN AN ORGANIZED HEALTH CARE EDUCATION/TRAINING PROGRAM

## 2020-03-19 PROCEDURE — 83735 ASSAY OF MAGNESIUM: CPT

## 2020-03-19 PROCEDURE — 36415 COLL VENOUS BLD VENIPUNCTURE: CPT

## 2020-03-19 PROCEDURE — 25000242 PHARM REV CODE 250 ALT 637 W/ HCPCS: Performed by: INTERNAL MEDICINE

## 2020-03-19 PROCEDURE — 63600175 PHARM REV CODE 636 W HCPCS: Performed by: INTERNAL MEDICINE

## 2020-03-19 PROCEDURE — 94640 AIRWAY INHALATION TREATMENT: CPT

## 2020-03-19 PROCEDURE — 84100 ASSAY OF PHOSPHORUS: CPT

## 2020-03-19 PROCEDURE — 99232 PR SUBSEQUENT HOSPITAL CARE,LEVL II: ICD-10-PCS | Mod: ,,, | Performed by: INTERNAL MEDICINE

## 2020-03-19 PROCEDURE — 11000001 HC ACUTE MED/SURG PRIVATE ROOM

## 2020-03-19 PROCEDURE — 80053 COMPREHEN METABOLIC PANEL: CPT

## 2020-03-19 PROCEDURE — 27000221 HC OXYGEN, UP TO 24 HOURS

## 2020-03-19 PROCEDURE — S4991 NICOTINE PATCH NONLEGEND: HCPCS | Performed by: STUDENT IN AN ORGANIZED HEALTH CARE EDUCATION/TRAINING PROGRAM

## 2020-03-19 PROCEDURE — 99232 SBSQ HOSP IP/OBS MODERATE 35: CPT | Mod: ,,, | Performed by: INTERNAL MEDICINE

## 2020-03-19 PROCEDURE — 94761 N-INVAS EAR/PLS OXIMETRY MLT: CPT

## 2020-03-19 PROCEDURE — 94660 CPAP INITIATION&MGMT: CPT

## 2020-03-19 RX ADMIN — NICOTINE 1 PATCH: 14 PATCH TRANSDERMAL at 10:03

## 2020-03-19 RX ADMIN — TIOTROPIUM BROMIDE 18 MCG: 18 CAPSULE ORAL; RESPIRATORY (INHALATION) at 09:03

## 2020-03-19 RX ADMIN — ALBUTEROL SULFATE 4 PUFF: 90 AEROSOL, METERED RESPIRATORY (INHALATION) at 09:03

## 2020-03-19 RX ADMIN — LISINOPRIL 20 MG: 20 TABLET ORAL at 10:03

## 2020-03-19 RX ADMIN — ALBUTEROL SULFATE 4 PUFF: 90 AEROSOL, METERED RESPIRATORY (INHALATION) at 01:03

## 2020-03-19 RX ADMIN — ENOXAPARIN SODIUM 40 MG: 100 INJECTION SUBCUTANEOUS at 10:03

## 2020-03-19 RX ADMIN — FLUTICASONE FUROATE AND VILANTEROL TRIFENATATE 1 PUFF: 100; 25 POWDER RESPIRATORY (INHALATION) at 09:03

## 2020-03-19 NOTE — PROGRESS NOTES
[facility] Hospital Medicine  Progress Note  This service was provided through telemedicine.    Patient Name: Kt Lutz  MRN: [id number]  Patient Class: IP- Inpatient   Admission Date: 3/12/2020  Length of Stay: 7 days  Attending Physician: Norma Hilario MD  Primary Care Provider: Primary Doctor [facility] Medicine Team: [facility] MED U Norma Hilario MD    Subjective:     Principal Problem:Suspected Covid-19 Virus Infection    Interval History:   This service was provided through telemedicine.  Visit type: Virtual visit with synchronous audio and video  Kt Lutz was located: 650/650 A.   Start time:  10:25 AM  End time:  10:37 AM  Total time face-to-face spent in the care of the patient: 12 minutes  Norma Hilario MD provided this service with the assistance of: Lyndsay Dominguez LPN    Chief Complaint   Patient presents with    Shortness of Breath     cough, hx of COPD       Follow up visit for COVID-19 risk    History / Events Overnight: No significant events reported by Nursing.  Patient reports feeling well; continues to require O2.     Data reviewed 3/19/2020:  No leucopenia.    High Risk Conditions:  Patient has a condition that poses threat to life and bodily function:  COVID-19 risk    Review of Systems   Constitutional: Negative for fever.   Respiratory: Negative for shortness of breath.      Objective:     Vital Signs (Most Recent):  Temp: 98.4 °F (36.9 °C) (03/19/20 1206)  Pulse: 83 (03/19/20 1321)  Resp: 18 (03/19/20 1321)  BP: 119/69 (03/19/20 1206)  SpO2: (!) 93 % (03/19/20 1321) Vital Signs (24h Range):  Temp:  [97.3 °F (36.3 °C)-98.5 °F (36.9 °C)] 98.4 °F (36.9 °C)  Pulse:  [70-88] 83  Resp:  [18-20] 18  SpO2:  [93 %-96 %] 93 %  BP: (119-140)/(69-86) 119/69     Weight: 113.9 kg (251 lb 1.7 oz)  Body mass index is 37.08 kg/m².    Intake/Output Summary (Last 24 hours) at 3/19/2020 1559  Last data filed at 3/19/2020 0539  Gross per 24 hour   Intake 720 ml    Output --   Net 720 ml      Physical Exam   Constitutional: He is cooperative. No distress.   Eyes: Conjunctivae and lids are normal. No scleral icterus. Pupils are equal.   Cardiovascular: Normal rate and regular rhythm.   Auscultation performed by Telemedicine Nurse   Pulmonary/Chest: Effort normal and breath sounds normal. No accessory muscle usage. No tachypnea. No respiratory distress.   Auscultation performed by Telemedicine Nurse   Abdominal: Normal appearance. He exhibits no distension.   Auscultation and palpation performed by Telemedicine Nurse   Musculoskeletal: He exhibits no edema.   Neurological: He is alert. He is not disoriented.   Skin: No rash noted. He is not diaphoretic. No cyanosis. No pallor.   Psychiatric: He has a normal mood and affect.     Significant Labs:   A1C:   Recent Labs   Lab 03/12/20  1346   HGBA1C 6.0*     CBC:   Recent Labs   Lab 03/19/20  0430   WBC 10.52   HGB 15.6   HCT 53.8        CMP:   Recent Labs   Lab 03/19/20  0430      K 4.5   CL 97   CO2 34*   GLU 96   BUN 18   CREATININE 0.9   CALCIUM 9.0   PROT 6.8   ALBUMIN 3.2*   BILITOT 0.5   ALKPHOS 63   AST 15   ALT 21   ANIONGAP 9   EGFRNONAA >60.0     Magnesium:   Recent Labs   Lab 03/19/20  0430   MG 2.2     TSH:   Recent Labs   Lab 03/12/20  1346   TSH 0.530     Significant Imaging:   CXR 3/12  FINDINGS:  Heart size is normal.  There is right basal consolidation versus atelectasis and mild interstitial edema versus pneumonia.  Heart size is upper normal.   Impression     Mild interstitial edema versus pneumonia.  A right lower or middle lobe pneumonia can not be excluded.     Assessment/Plan:      Active Diagnoses:    Diagnosis Date Noted POA    PRINCIPAL PROBLEM:  Suspected Covid-19 Virus Infection [R68.89] 03/15/2020 Yes    COPD exacerbation [J44.1] 03/15/2020 Yes    Acute respiratory failure with hypoxia and hypercapnia [J96.01, J96.02] 03/12/2020 Yes    COPD with acute exacerbation [J44.1] 02/04/2020  Yes    Essential hypertension [I10] 02/04/2020 Yes    Tobacco abuse [Z72.0] 02/04/2020 Yes      Problems Resolved During this Admission:       Overview / ICU Course:    54M with a PMHx of tobacco abuse (current active smoker/40 pack year hx), COPD (recently diagnosed on hospitalization 3 weeks ago), alcohol/drug abuse (in remission) presented with shortness of breath, malaise and dry cough for 2 weeks. Symptoms progressive since diagnosis. He denies fevers, chills, but does report headache. Patient was admitted to Hospital Medicine on 03/12/2020 for COPD exacerbation. On admission, oxygen saturation in low 80s and ABG with pCO2 in low 70s and patient initiated on BiPAP, Q4 nebs, vanc/zosyn, methylprednisolone, Breo and Spiriva. On and off BiPAP. pCO2 low 90s the next morning. Discussed need to remain on BiPAP today and BiPAP settings increased to 20/5.  Despite orders for continuous BiPAP, patient intermittently removing BiPAP to go outside and smoke a cigarette.  PCO2 remains elevated.  Pulmonology consult given patient's poor response to BiPAP efforts.  Despite intermittent BiPAP, the patient's PCO2 remained elevated. Mild interstitial edema versus pneumonia on CXR. Critical Care to move patient to the ICU for airway watch and COVID-19 testing.  Stepped down to  3/16, doing well, no supplemental oxygen. No cough, feels like nicotine patch is working well, no cravings. No major complaints. Lives at a group home run by the [employer], shares a room with 5 other older men. Medically ready for discharge but unsafe quarantine situation.     Inpatient Medications Prescribed for Management of Current Problems:     Scheduled Meds:    albuterol  4 puff Inhalation Q6H WAKE    enoxaparin  40 mg Subcutaneous Daily    fluticasone furoate-vilanteroL  1 puff Inhalation Daily    lisinopriL  20 mg Oral Daily    nicotine  1 patch Transdermal Daily    tiotropium  18 mcg Inhalation Daily     Continuous Infusions:   As  Needed: Dextrose 10% Bolus, Dextrose 10% Bolus, glucagon (human recombinant), glucose, glucose, sodium chloride 0.9%, traMADoL    Assessment and Plan by Problem    Person under investigation for COVID-19  Acute respiratory failure with hypoxemia and hypercapnia  - COVID-19 testing sent.  - Infection Control notified  - Isolation:   - Airborne and Droplet Precautions  - N95 masks must be fit tested, wear eye protection  - 20 second hand hygiene              - Limit visitors per hospital policy  - Diagnostics (leukopenia, hyponatremia, hyperferritinemia, elevated troponin, elevated d-dimer, age, and comorbidities are significant predictors of poor clinical outcome)              - CBC no lymphopenia, CMP WNL, D-dimer, ferritin, troponin, CRP, LDH, Procalcitonin not ordered              - ECG              - rapid Flu neg              - RIP neg              - Legionella antigen              - Blood culture x2              - Portable CXR -? PNA              - UA and culture  - Management:              - Supplemental O2 to maintain SpO2 >92%, taper as able              - Telemetry & Continuous Pulse Ox              - albuterol INHALER PRN (avoid nebulization of secretions)              - No BiPAP to avoid aerosolization (including home BiPAP)              - No steroids unless septic shock due to increased viral replication: patient received prednisone of COPD.              - fluid sparing resuscitation              - Empiric antibiotics per likely source & patient allergies                          - CAP: patient treated with Zosyn and levofloxacin.     Tobacco use disorder  - nicotine patch     TRACIE on BiPAP  Avoid at this time due to not being in a negative pressure room  Not on home O2     Dispo  Homeless, staying at [facility].   - SW consulted    HIGH RISK CONDITION(S):   Patient has a condition that poses threat to life and bodily function: Respiratory Distress, Hypoxia    Discharge plan and follow up  Home or  Self Care  BLANCA 3/18/2020  Previous admission:  2/4/20  Goals of Care:  General Prognosis: good  Goals: Curative  Comfort Only: No  Hospice: No  Code Status: Full Code    Diet: Diet diabetic [facility]; 2000 Calorie  GI Prophylaxis: Not indicated  Significant LDAs:   IV Access Type: Peripheral  Urinary Catheter Indication if present: Patient Does Not Have Urinary Catheter  Other Lines/Tubes/Drains:    VTE Risk Mitigation (From admission, onward)         Ordered     enoxaparin injection 40 mg  Daily      03/17/20 1455     IP VTE HIGH RISK PATIENT  Once      03/12/20 1249                Norma Hilario MD  Department of Hospital Medicine   [facility]

## 2020-03-19 NOTE — PLAN OF CARE
03/19/20 0954   Post-Acute Status   Post-Acute Authorization Other   Other Status No Post-Acute Service Needs     Will need shelter option with [facility]. Sw to follow with stability.

## 2020-03-20 PROCEDURE — 99232 PR SUBSEQUENT HOSPITAL CARE,LEVL II: ICD-10-PCS | Mod: ,,, | Performed by: INTERNAL MEDICINE

## 2020-03-20 PROCEDURE — 94640 AIRWAY INHALATION TREATMENT: CPT

## 2020-03-20 PROCEDURE — 25000003 PHARM REV CODE 250: Performed by: STUDENT IN AN ORGANIZED HEALTH CARE EDUCATION/TRAINING PROGRAM

## 2020-03-20 PROCEDURE — 25000242 PHARM REV CODE 250 ALT 637 W/ HCPCS: Performed by: INTERNAL MEDICINE

## 2020-03-20 PROCEDURE — 94761 N-INVAS EAR/PLS OXIMETRY MLT: CPT

## 2020-03-20 PROCEDURE — 63600175 PHARM REV CODE 636 W HCPCS: Performed by: INTERNAL MEDICINE

## 2020-03-20 PROCEDURE — 99232 SBSQ HOSP IP/OBS MODERATE 35: CPT | Mod: ,,, | Performed by: INTERNAL MEDICINE

## 2020-03-20 PROCEDURE — S4991 NICOTINE PATCH NONLEGEND: HCPCS | Performed by: STUDENT IN AN ORGANIZED HEALTH CARE EDUCATION/TRAINING PROGRAM

## 2020-03-20 PROCEDURE — 11000001 HC ACUTE MED/SURG PRIVATE ROOM

## 2020-03-20 PROCEDURE — 27000221 HC OXYGEN, UP TO 24 HOURS

## 2020-03-20 RX ADMIN — TRAMADOL HYDROCHLORIDE 50 MG: 50 TABLET, FILM COATED ORAL at 09:03

## 2020-03-20 RX ADMIN — FLUTICASONE FUROATE AND VILANTEROL TRIFENATATE 1 PUFF: 100; 25 POWDER RESPIRATORY (INHALATION) at 08:03

## 2020-03-20 RX ADMIN — TRAMADOL HYDROCHLORIDE 50 MG: 50 TABLET, FILM COATED ORAL at 01:03

## 2020-03-20 RX ADMIN — NICOTINE 1 PATCH: 14 PATCH TRANSDERMAL at 10:03

## 2020-03-20 RX ADMIN — TIOTROPIUM BROMIDE 18 MCG: 18 CAPSULE ORAL; RESPIRATORY (INHALATION) at 08:03

## 2020-03-20 RX ADMIN — NICOTINE 1 PATCH: 14 PATCH TRANSDERMAL at 09:03

## 2020-03-20 RX ADMIN — ALBUTEROL SULFATE 4 PUFF: 90 AEROSOL, METERED RESPIRATORY (INHALATION) at 02:03

## 2020-03-20 RX ADMIN — ENOXAPARIN SODIUM 40 MG: 100 INJECTION SUBCUTANEOUS at 10:03

## 2020-03-20 RX ADMIN — ALBUTEROL SULFATE 4 PUFF: 90 AEROSOL, METERED RESPIRATORY (INHALATION) at 08:03

## 2020-03-20 RX ADMIN — ALBUTEROL SULFATE 4 PUFF: 90 AEROSOL, METERED RESPIRATORY (INHALATION) at 07:03

## 2020-03-20 RX ADMIN — LISINOPRIL 20 MG: 20 TABLET ORAL at 10:03

## 2020-03-20 NOTE — PLAN OF CARE
Problem: Adult Inpatient Plan of Care  Goal: Plan of Care Review  Outcome: Ongoing, Progressing  Goal: Patient-Specific Goal (Individualization)  Outcome: Ongoing, Progressing  Goal: Absence of Hospital-Acquired Illness or Injury  Outcome: Ongoing, Progressing  Goal: Optimal Comfort and Wellbeing  Outcome: Ongoing, Progressing  Goal: Readiness for Transition of Care  Outcome: Ongoing, Progressing  Goal: Rounds/Family Conference  Outcome: Ongoing, Progressing   AAOx4. POC reviewed with patient. Vitals stable. Afebrile. Medications given as ordered. Tolerated well. No complaints of pain or discomfort. Will continue to monitor

## 2020-03-20 NOTE — PLAN OF CARE
PLAN OF CARE REVIEWED WITH PT.  PT AA+OX4.  ABLE TO MAKE NEEDS KNOWN.  DOES NOT APPEAR TO BE IN ANY DISTRESS.  NO C/O PAIN.  WILL CONTINUE TO REASSESS PAIN.  INDEPENDENT WITH ADLS.  CONT. OF B/B.  PT REMAINS FREE FROM FALLS, INJURY, AND TRAUMA.  FALL PRECAUTIONS IN PLACE.  BED IN LOWEST POSITION WITH WHEELS LOCKED.  CALL LIGHT WITHIN REACH.  WILL CONTINUE TO MONITOR.

## 2020-03-20 NOTE — PROGRESS NOTES
[facility] Hospital Medicine  Progress Note  This service was provided through telemedicine.    Patient Name: Kt Lutz  MRN: [id number]  Patient Class: IP- Inpatient   Admission Date: 3/12/2020  Length of Stay: 8 days  Attending Physician: Norma Hilario MD  Primary Care Provider: [facility] Medicine Team: [facility] Norma Hilario MD    Subjective:     Principal Problem:Suspected Covid-19 Virus Infection    Interval History:   This service was provided through telemedicine.  Visit type: Virtual visit with synchronous audio and video  Kt Lutz was located: 650/650 A.   Start time:  10:46 AM  End time:  10:58 AM  Total time face-to-face spent in the care of the patient: 12 minutes  Norma Hilario MD provided this service with the assistance of: Keeley Mercado LPN    Chief Complaint   Patient presents with    Shortness of Breath     cough, hx of COPD       Follow up visit for COVID-19 risk    History / Events Overnight: No significant events reported by Nursing.  Patient reports feeling fine but continues to require O2.     Data reviewed 3/20/2020:  No leucopenia.    High Risk Conditions:  Patient has a condition that poses threat to life and bodily function:  COVID-19 risk    Review of Systems   Constitutional: Negative for fever.   Respiratory: Negative for shortness of breath.      Objective:     Vital Signs (Most Recent):  Temp: 98.1 °F (36.7 °C) (03/20/20 1308)  Pulse: 89 (03/20/20 1408)  Resp: 18 (03/20/20 1408)  BP: 134/77 (03/20/20 1308)  SpO2: (!) 92 % (03/20/20 1408) Vital Signs (24h Range):  Temp:  [97.7 °F (36.5 °C)-98.6 °F (37 °C)] 98.1 °F (36.7 °C)  Pulse:  [72-96] 89  Resp:  [16-20] 18  SpO2:  [91 %-94 %] 92 %  BP: (107-134)/(53-84) 134/77     Weight: 113.9 kg (251 lb 1.7 oz)  Body mass index is 37.08 kg/m².    Intake/Output Summary (Last 24 hours) at 3/20/2020 1650  Last data filed at 3/20/2020 0539  Gross per 24 hour   Intake 480 ml   Output  --   Net 480 ml      Physical Exam   Constitutional: He is cooperative. No distress.   Eyes: Conjunctivae and lids are normal. No scleral icterus.   Cardiovascular: Normal rate and regular rhythm.   Auscultation performed by Telemedicine Nurse   Pulmonary/Chest: Effort normal. No accessory muscle usage. No tachypnea. No respiratory distress. He has rales in the left lower field.   Auscultation performed by Telemedicine Nurse   Abdominal: Normal appearance. He exhibits no distension.   Auscultation and palpation performed by Telemedicine Nurse   Musculoskeletal: He exhibits no edema.   Neurological: He is alert. He is not disoriented.   Skin: No rash noted. He is not diaphoretic. No cyanosis. No pallor.     Significant Labs:   A1C:   Recent Labs   Lab 03/12/20  1346   HGBA1C 6.0*     CBC:   Recent Labs   Lab 03/19/20  0430   WBC 10.52   HGB 15.6   HCT 53.8        CMP:   Recent Labs   Lab 03/19/20  0430      K 4.5   CL 97   CO2 34*   GLU 96   BUN 18   CREATININE 0.9   CALCIUM 9.0   PROT 6.8   ALBUMIN 3.2*   BILITOT 0.5   ALKPHOS 63   AST 15   ALT 21   ANIONGAP 9   EGFRNONAA >60.0     Magnesium:   Recent Labs   Lab 03/19/20  0430   MG 2.2     TSH:   Recent Labs   Lab 03/12/20  1346   TSH 0.530     Significant Imaging:   CXR 3/12  FINDINGS:  Heart size is normal.  There is right basal consolidation versus atelectasis and mild interstitial edema versus pneumonia.  Heart size is upper normal.   Impression     Mild interstitial edema versus pneumonia.  A right lower or middle lobe pneumonia can not be excluded.     Assessment/Plan:      Active Diagnoses:    Diagnosis Date Noted POA    PRINCIPAL PROBLEM:  Suspected Covid-19 Virus Infection [R68.89] 03/15/2020 Yes    COPD exacerbation [J44.1] 03/15/2020 Yes    Acute respiratory failure with hypoxia and hypercapnia [J96.01, J96.02] 03/12/2020 Yes    COPD with acute exacerbation [J44.1] 02/04/2020 Yes    Essential hypertension [I10] 02/04/2020 Yes    Tobacco  abuse [Z72.0] 02/04/2020 Yes      Problems Resolved During this Admission:       Overview / ICU Course:    54M with a PMHx of tobacco abuse (current active smoker/40 pack year hx), COPD (recently diagnosed on hospitalization 3 weeks ago), alcohol/drug abuse (in remission) presented with shortness of breath, malaise and dry cough for 2 weeks. Symptoms progressive since diagnosis. He denies fevers, chills, but does report headache. Patient was admitted to Hospital Medicine on 03/12/2020 for COPD exacerbation. On admission, oxygen saturation in low 80s and ABG with pCO2 in low 70s and patient initiated on BiPAP, Q4 nebs, vanc/zosyn, methylprednisolone, Breo and Spiriva. On and off BiPAP. pCO2 low 90s the next morning. Discussed need to remain on BiPAP today and BiPAP settings increased to 20/5.  Despite orders for continuous BiPAP, patient intermittently removing BiPAP to go outside and smoke a cigarette.  PCO2 remains elevated.  Pulmonology consult given patient's poor response to BiPAP efforts.  Despite intermittent BiPAP, the patient's PCO2 remained elevated. Mild interstitial edema versus pneumonia on CXR. Critical Care to move patient to the ICU for airway watch and COVID-19 testing.  Stepped down to  3/16, doing well, no supplemental oxygen. No cough, feels like nicotine patch is working well, no cravings. No major complaints. Lives at a group home run by [facility] [employer], shares a room with 5 other older men. Medically ready for discharge but unsafe quarantine situation.     Inpatient Medications Prescribed for Management of Current Problems:     Scheduled Meds:    albuterol  4 puff Inhalation Q6H WAKE    enoxaparin  40 mg Subcutaneous Daily    fluticasone furoate-vilanteroL  1 puff Inhalation Daily    lisinopriL  20 mg Oral Daily    nicotine  1 patch Transdermal Daily    tiotropium  18 mcg Inhalation Daily     Continuous Infusions:   As Needed: Dextrose 10% Bolus, Dextrose 10% Bolus, glucagon (human  recombinant), glucose, glucose, sodium chloride 0.9%, traMADoL    Assessment and Plan by Problem    Person under investigation for COVID-19  Acute respiratory failure with hypoxemia and hypercapnia  - COVID-19 testing sent.  - Infection Control notified  - Isolation:   - Airborne and Droplet Precautions  - N95 masks must be fit tested, wear eye protection  - 20 second hand hygiene              - Limit visitors per hospital policy  - Diagnostics (leukopenia, hyponatremia, hyperferritinemia, elevated troponin, elevated d-dimer, age, and comorbidities are significant predictors of poor clinical outcome)              - CBC no lymphopenia, CMP WNL, D-dimer, ferritin, troponin, CRP, LDH, Procalcitonin not ordered              - ECG              - rapid Flu neg              - RIP neg              - Legionella antigen              - Blood culture x2              - Portable CXR -? PNA              - UA and culture  - Management:              - Supplemental O2 to maintain SpO2 >92%, taper as able              - Telemetry & Continuous Pulse Ox              - albuterol INHALER PRN (avoid nebulization of secretions)              - No BiPAP to avoid aerosolization (including home BiPAP)              - No steroids unless septic shock due to increased viral replication: patient received prednisone of COPD.              - fluid sparing resuscitation              - Empiric antibiotics per likely source & patient allergies                          - CAP: patient treated with Zosyn and levofloxacin.   - IS to bedside     Tobacco use disorder  - nicotine patch     TRACIE on BiPAP  Avoid at this time due to not being in a negative pressure room  Not on home O2     Dispo  [facility]   - SW consulted    HIGH RISK CONDITION(S):   Patient has a condition that poses threat to life and bodily function: Respiratory Distress, Hypoxia    Discharge plan and follow up  Home or Self Care  BLANCA 3/18/2020  Previous admission:   2/4/20  Goals of Care:  General Prognosis: good  Goals: Curative  Comfort Only: No  Hospice: No  Code Status: Full Code    Diet: Diet Adult Regular (IDDSI Level 7)  GI Prophylaxis: Not indicated  Significant LDAs:   IV Access Type: Peripheral  Urinary Catheter Indication if present: Patient Does Not Have Urinary Catheter  Other Lines/Tubes/Drains:    VTE Risk Mitigation (From admission, onward)         Ordered     enoxaparin injection 40 mg  Daily      03/17/20 1455     IP VTE HIGH RISK PATIENT  Once      03/12/20 1249                Norma Hilario MD  Department of Hospital Medicine   [facility]

## 2020-03-20 NOTE — PLAN OF CARE
03/20/20 0842   Post-Acute Status   Post-Acute Authorization Other   Other Status No Post-Acute Service Needs

## 2020-03-21 LAB
ALBUMIN SERPL BCP-MCNC: 3.3 G/DL (ref 3.5–5.2)
ALP SERPL-CCNC: 71 U/L (ref 55–135)
ALT SERPL W/O P-5'-P-CCNC: 25 U/L (ref 10–44)
ANION GAP SERPL CALC-SCNC: 7 MMOL/L (ref 8–16)
AST SERPL-CCNC: 17 U/L (ref 10–40)
BASOPHILS # BLD AUTO: 0.05 K/UL (ref 0–0.2)
BASOPHILS NFR BLD: 0.5 % (ref 0–1.9)
BILIRUB SERPL-MCNC: 0.6 MG/DL (ref 0.1–1)
BUN SERPL-MCNC: 17 MG/DL (ref 6–20)
CALCIUM SERPL-MCNC: 9 MG/DL (ref 8.7–10.5)
CHLORIDE SERPL-SCNC: 96 MMOL/L (ref 95–110)
CO2 SERPL-SCNC: 35 MMOL/L (ref 23–29)
CREAT SERPL-MCNC: 0.9 MG/DL (ref 0.5–1.4)
DIFFERENTIAL METHOD: ABNORMAL
EOSINOPHIL # BLD AUTO: 0.3 K/UL (ref 0–0.5)
EOSINOPHIL NFR BLD: 3.4 % (ref 0–8)
ERYTHROCYTE [DISTWIDTH] IN BLOOD BY AUTOMATED COUNT: 13.5 % (ref 11.5–14.5)
EST. GFR  (AFRICAN AMERICAN): >60 ML/MIN/1.73 M^2
EST. GFR  (NON AFRICAN AMERICAN): >60 ML/MIN/1.73 M^2
GLUCOSE SERPL-MCNC: 104 MG/DL (ref 70–110)
HCT VFR BLD AUTO: 52.3 % (ref 40–54)
HGB BLD-MCNC: 15.3 G/DL (ref 14–18)
IMM GRANULOCYTES # BLD AUTO: 0.12 K/UL (ref 0–0.04)
IMM GRANULOCYTES NFR BLD AUTO: 1.2 % (ref 0–0.5)
LYMPHOCYTES # BLD AUTO: 2 K/UL (ref 1–4.8)
LYMPHOCYTES NFR BLD: 20.3 % (ref 18–48)
MAGNESIUM SERPL-MCNC: 2.2 MG/DL (ref 1.6–2.6)
MCH RBC QN AUTO: 28.5 PG (ref 27–31)
MCHC RBC AUTO-ENTMCNC: 29.3 G/DL (ref 32–36)
MCV RBC AUTO: 97 FL (ref 82–98)
MONOCYTES # BLD AUTO: 1.2 K/UL (ref 0.3–1)
MONOCYTES NFR BLD: 11.5 % (ref 4–15)
NEUTROPHILS # BLD AUTO: 6.3 K/UL (ref 1.8–7.7)
NEUTROPHILS NFR BLD: 63.1 % (ref 38–73)
NRBC BLD-RTO: 0 /100 WBC
PHOSPHATE SERPL-MCNC: 4 MG/DL (ref 2.7–4.5)
PLATELET # BLD AUTO: 294 K/UL (ref 150–350)
PMV BLD AUTO: 9.8 FL (ref 9.2–12.9)
POTASSIUM SERPL-SCNC: 5.1 MMOL/L (ref 3.5–5.1)
PROT SERPL-MCNC: 6.9 G/DL (ref 6–8.4)
RBC # BLD AUTO: 5.37 M/UL (ref 4.6–6.2)
SODIUM SERPL-SCNC: 138 MMOL/L (ref 136–145)
WBC # BLD AUTO: 10.02 K/UL (ref 3.9–12.7)

## 2020-03-21 PROCEDURE — 99232 SBSQ HOSP IP/OBS MODERATE 35: CPT | Mod: ,,, | Performed by: INTERNAL MEDICINE

## 2020-03-21 PROCEDURE — 11000001 HC ACUTE MED/SURG PRIVATE ROOM

## 2020-03-21 PROCEDURE — 94761 N-INVAS EAR/PLS OXIMETRY MLT: CPT

## 2020-03-21 PROCEDURE — 25000003 PHARM REV CODE 250: Performed by: STUDENT IN AN ORGANIZED HEALTH CARE EDUCATION/TRAINING PROGRAM

## 2020-03-21 PROCEDURE — 84100 ASSAY OF PHOSPHORUS: CPT

## 2020-03-21 PROCEDURE — 80053 COMPREHEN METABOLIC PANEL: CPT

## 2020-03-21 PROCEDURE — 36415 COLL VENOUS BLD VENIPUNCTURE: CPT

## 2020-03-21 PROCEDURE — 85025 COMPLETE CBC W/AUTO DIFF WBC: CPT

## 2020-03-21 PROCEDURE — 27000221 HC OXYGEN, UP TO 24 HOURS

## 2020-03-21 PROCEDURE — 94640 AIRWAY INHALATION TREATMENT: CPT

## 2020-03-21 PROCEDURE — S4991 NICOTINE PATCH NONLEGEND: HCPCS | Performed by: STUDENT IN AN ORGANIZED HEALTH CARE EDUCATION/TRAINING PROGRAM

## 2020-03-21 PROCEDURE — 63600175 PHARM REV CODE 636 W HCPCS: Performed by: INTERNAL MEDICINE

## 2020-03-21 PROCEDURE — 25000242 PHARM REV CODE 250 ALT 637 W/ HCPCS: Performed by: INTERNAL MEDICINE

## 2020-03-21 PROCEDURE — 99232 PR SUBSEQUENT HOSPITAL CARE,LEVL II: ICD-10-PCS | Mod: ,,, | Performed by: INTERNAL MEDICINE

## 2020-03-21 PROCEDURE — 83735 ASSAY OF MAGNESIUM: CPT

## 2020-03-21 RX ADMIN — TRAMADOL HYDROCHLORIDE 50 MG: 50 TABLET, FILM COATED ORAL at 09:03

## 2020-03-21 RX ADMIN — ALBUTEROL SULFATE 4 PUFF: 90 AEROSOL, METERED RESPIRATORY (INHALATION) at 08:03

## 2020-03-21 RX ADMIN — TIOTROPIUM BROMIDE 18 MCG: 18 CAPSULE ORAL; RESPIRATORY (INHALATION) at 07:03

## 2020-03-21 RX ADMIN — ENOXAPARIN SODIUM 40 MG: 100 INJECTION SUBCUTANEOUS at 09:03

## 2020-03-21 RX ADMIN — NICOTINE 1 PATCH: 14 PATCH TRANSDERMAL at 09:03

## 2020-03-21 RX ADMIN — ALBUTEROL SULFATE 4 PUFF: 90 AEROSOL, METERED RESPIRATORY (INHALATION) at 12:03

## 2020-03-21 RX ADMIN — ALBUTEROL SULFATE 4 PUFF: 90 AEROSOL, METERED RESPIRATORY (INHALATION) at 07:03

## 2020-03-21 RX ADMIN — FLUTICASONE FUROATE AND VILANTEROL TRIFENATATE 1 PUFF: 100; 25 POWDER RESPIRATORY (INHALATION) at 07:03

## 2020-03-21 RX ADMIN — LISINOPRIL 20 MG: 20 TABLET ORAL at 09:03

## 2020-03-21 NOTE — PROGRESS NOTES
[facility] Hospital Medicine  Progress Note  This service was provided through telemedicine.    Patient Name: Kt Lutz  MRN: [id number]  Patient Class: IP- Inpatient   Admission Date: 3/12/2020  Length of Stay: 9 days  Attending Physician: Norma Hilario MD  Primary Care Provider: Primary Doctor [facility]: [facility] Norma Hilario MD    Subjective:     Principal Problem:Suspected Covid-19 Virus Infection    Interval History:   This service was provided through telemedicine.  Visit type: Virtual visit with synchronous audio and video  Kt Lutz was located: 650/650 A.   Start time:  3:06 PM  End time:  3:18 PM  Total time face-to-face spent in the care of the patient: 12 minutes  Norma Hilario MD provided this service with the assistance of:     Chief Complaint   Patient presents with    Shortness of Breath     cough, hx of COPD       Follow up visit for COVID-19 risk    History / Events Overnight: No significant events reported by Nursing.  Patient reports feeling better but continues to require O2.     Data reviewed 3/21/2020:  No leucopenia.    High Risk Conditions:  Patient has a condition that poses threat to life and bodily function:  COVID-19 risk    Review of Systems   Constitutional: Negative for fever.   Respiratory: Negative for shortness of breath.      Objective:     Vital Signs (Most Recent):  Temp: 97.9 °F (36.6 °C) (03/21/20 1229)  Pulse: 84 (03/21/20 1229)  Resp: 18 (03/21/20 1229)  BP: (!) 126/92 (03/21/20 1229)  SpO2: 96 % (03/21/20 1229) Vital Signs (24h Range):  Temp:  [97.5 °F (36.4 °C)-98.8 °F (37.1 °C)] 97.9 °F (36.6 °C)  Pulse:  [80-90] 84  Resp:  [16-20] 18  SpO2:  [90 %-96 %] 96 %  BP: (115-141)/(61-92) 126/92     Weight: 113.9 kg (251 lb 1.7 oz)  Body mass index is 37.08 kg/m².  No intake or output data in the 24 hours ending 03/21/20 1510   Physical Exam   Constitutional: He is cooperative. No distress.   Eyes: Conjunctivae and  lids are normal. No scleral icterus.   Pulmonary/Chest: Effort normal. No accessory muscle usage. No tachypnea. No respiratory distress.   Neurological: He is alert. He is not disoriented.   Skin: Skin is warm and dry.     Significant Labs:   A1C:   Recent Labs   Lab 03/12/20  1346   HGBA1C 6.0*     CBC:   Recent Labs   Lab 03/21/20  0420   WBC 10.02   HGB 15.3   HCT 52.3        CMP:   Recent Labs   Lab 03/21/20  0420      K 5.1   CL 96   CO2 35*      BUN 17   CREATININE 0.9   CALCIUM 9.0   PROT 6.9   ALBUMIN 3.3*   BILITOT 0.6   ALKPHOS 71   AST 17   ALT 25   ANIONGAP 7*   EGFRNONAA >60.0     Magnesium:   Recent Labs   Lab 03/21/20  0420   MG 2.2     TSH:   Recent Labs   Lab 03/12/20  1346   TSH 0.530     Significant Imaging:   CXR 3/12  FINDINGS:  Heart size is normal.  There is right basal consolidation versus atelectasis and mild interstitial edema versus pneumonia.  Heart size is upper normal.   Impression     Mild interstitial edema versus pneumonia.  A right lower or middle lobe pneumonia can not be excluded.     Assessment/Plan:      Active Diagnoses:    Diagnosis Date Noted POA    PRINCIPAL PROBLEM:  Suspected Covid-19 Virus Infection [R68.89] 03/15/2020 Yes    COPD exacerbation [J44.1] 03/15/2020 Yes    Acute respiratory failure with hypoxia and hypercapnia [J96.01, J96.02] 03/12/2020 Yes    COPD with acute exacerbation [J44.1] 02/04/2020 Yes    Essential hypertension [I10] 02/04/2020 Yes    Tobacco abuse [Z72.0] 02/04/2020 Yes      Problems Resolved During this Admission:       Overview / ICU Course:    54M with a PMHx of tobacco abuse (current active smoker/40 pack year hx), COPD (recently diagnosed on hospitalization 3 weeks ago), alcohol/drug abuse (in remission) presented with shortness of breath, malaise and dry cough for 2 weeks. Symptoms progressive since diagnosis. He denies fevers, chills, but does report headache. Patient was admitted to Hospital Medicine on 03/12/2020  for COPD exacerbation. On admission, oxygen saturation in low 80s and ABG with pCO2 in low 70s and patient initiated on BiPAP, Q4 nebs, vanc/zosyn, methylprednisolone, Breo and Spiriva. On and off BiPAP. pCO2 low 90s the next morning. Discussed need to remain on BiPAP today and BiPAP settings increased to 20/5.  Despite orders for continuous BiPAP, patient intermittently removing BiPAP to go outside and smoke a cigarette.  PCO2 remains elevated.  Pulmonology consult given patient's poor response to BiPAP efforts.  Despite intermittent BiPAP, the patient's PCO2 remained elevated. Mild interstitial edema versus pneumonia on CXR. Critical Care to move patient to the ICU for airway watch and COVID-19 testing.  Stepped down to  3/16, doing well, no supplemental oxygen. No cough, feels like nicotine patch is working well, no cravings. No major complaints. Lives at a group home run by [employer], shares a room with 5 other older men. Medically ready for discharge but unsafe quarantine situation.     Inpatient Medications Prescribed for Management of Current Problems:     Scheduled Meds:    albuterol  4 puff Inhalation Q6H WAKE    enoxaparin  40 mg Subcutaneous Daily    fluticasone furoate-vilanteroL  1 puff Inhalation Daily    lisinopriL  20 mg Oral Daily    nicotine  1 patch Transdermal Daily    tiotropium  18 mcg Inhalation Daily     Continuous Infusions:   As Needed: Dextrose 10% Bolus, Dextrose 10% Bolus, glucagon (human recombinant), glucose, glucose, sodium chloride 0.9%, traMADoL    Assessment and Plan by Problem    Person under investigation for COVID-19  Acute respiratory failure with hypoxemia and hypercapnia  - COVID-19 testing sent.  - Infection Control notified  - Isolation:   - Airborne and Droplet Precautions  - N95 masks must be fit tested, wear eye protection  - 20 second hand hygiene              - Limit visitors per hospital policy  - Diagnostics (leukopenia, hyponatremia,  hyperferritinemia, elevated troponin, elevated d-dimer, age, and comorbidities are significant predictors of poor clinical outcome)              - CBC no lymphopenia, CMP WNL, D-dimer, ferritin, troponin, CRP, LDH, Procalcitonin not ordered              - ECG              - rapid Flu neg              - RIP neg              - Legionella antigen              - Blood culture x2              - Portable CXR -? PNA              - UA and culture  - Management:              - Supplemental O2 to maintain SpO2 >92%, taper as able              - Telemetry & Continuous Pulse Ox              - albuterol INHALER PRN (avoid nebulization of secretions)              - No BiPAP to avoid aerosolization (including home BiPAP)              - No steroids unless septic shock due to increased viral replication: patient received prednisone of COPD.              - fluid sparing resuscitation              - Empiric antibiotics per likely source & patient allergies                          - CAP: patient treated with Zosyn and levofloxacin.   - IS to bedside     Tobacco use disorder  - nicotine patch     TRACIE on BiPAP  Avoid at this time due to not being in a negative pressure room  Not on home O2     Dispo  Homeless, staying at [facility] [employer].   - SW consulted    HIGH RISK CONDITION(S):   Patient has a condition that poses threat to life and bodily function: Respiratory Distress, Hypoxia    Discharge plan and follow up  Home or Self Care  BLANCA 3/18/2020  Previous admission:  2/4/20  Goals of Care:  General Prognosis: good  Goals: Curative  Comfort Only: No  Hospice: No  Code Status: Full Code    Diet: Diet Adult Regular (IDDSI Level 7)  GI Prophylaxis: Not indicated  Significant LDAs:   IV Access Type: Peripheral  Urinary Catheter Indication if present: Patient Does Not Have Urinary Catheter  Other Lines/Tubes/Drains:    VTE Risk Mitigation (From admission, onward)         Ordered     enoxaparin injection 40 mg  Daily      03/17/20 1455     IP  VTE HIGH RISK PATIENT  Once      03/12/20 1249                Norma Hilario MD  Department of Hospital Medicine   [facility]

## 2020-03-21 NOTE — PLAN OF CARE
RAHEEM left message for Mercedez Winkler ([phone]) with the [facility] for the homeless population to see if they are able to accept patient today. SW waiting to hear back.     12:50 PM  SW left message for Mercedez with [facility] to inquire about placement. Waiting to hear back.    1:45 PM  RAHEEM left message for Mercedez with [facility]. Waiting to hear back.    Jaci Morales LMSW  [facility]

## 2020-03-22 PROCEDURE — 25000003 PHARM REV CODE 250: Performed by: STUDENT IN AN ORGANIZED HEALTH CARE EDUCATION/TRAINING PROGRAM

## 2020-03-22 PROCEDURE — S4991 NICOTINE PATCH NONLEGEND: HCPCS | Performed by: STUDENT IN AN ORGANIZED HEALTH CARE EDUCATION/TRAINING PROGRAM

## 2020-03-22 PROCEDURE — 25000242 PHARM REV CODE 250 ALT 637 W/ HCPCS: Performed by: INTERNAL MEDICINE

## 2020-03-22 PROCEDURE — 99232 SBSQ HOSP IP/OBS MODERATE 35: CPT | Mod: ,,, | Performed by: INTERNAL MEDICINE

## 2020-03-22 PROCEDURE — 94761 N-INVAS EAR/PLS OXIMETRY MLT: CPT

## 2020-03-22 PROCEDURE — 27000221 HC OXYGEN, UP TO 24 HOURS

## 2020-03-22 PROCEDURE — 63600175 PHARM REV CODE 636 W HCPCS: Performed by: INTERNAL MEDICINE

## 2020-03-22 PROCEDURE — 11000001 HC ACUTE MED/SURG PRIVATE ROOM

## 2020-03-22 PROCEDURE — 94799 UNLISTED PULMONARY SVC/PX: CPT

## 2020-03-22 PROCEDURE — 99232 PR SUBSEQUENT HOSPITAL CARE,LEVL II: ICD-10-PCS | Mod: ,,, | Performed by: INTERNAL MEDICINE

## 2020-03-22 PROCEDURE — 99900035 HC TECH TIME PER 15 MIN (STAT)

## 2020-03-22 PROCEDURE — 94640 AIRWAY INHALATION TREATMENT: CPT

## 2020-03-22 RX ADMIN — ALBUTEROL SULFATE 4 PUFF: 90 AEROSOL, METERED RESPIRATORY (INHALATION) at 09:03

## 2020-03-22 RX ADMIN — TIOTROPIUM BROMIDE 18 MCG: 18 CAPSULE ORAL; RESPIRATORY (INHALATION) at 09:03

## 2020-03-22 RX ADMIN — ALBUTEROL SULFATE 4 PUFF: 90 AEROSOL, METERED RESPIRATORY (INHALATION) at 08:03

## 2020-03-22 RX ADMIN — NICOTINE 1 PATCH: 14 PATCH TRANSDERMAL at 09:03

## 2020-03-22 RX ADMIN — ALBUTEROL SULFATE 4 PUFF: 90 AEROSOL, METERED RESPIRATORY (INHALATION) at 04:03

## 2020-03-22 RX ADMIN — TRAMADOL HYDROCHLORIDE 50 MG: 50 TABLET, FILM COATED ORAL at 08:03

## 2020-03-22 RX ADMIN — LISINOPRIL 20 MG: 20 TABLET ORAL at 10:03

## 2020-03-22 RX ADMIN — FLUTICASONE FUROATE AND VILANTEROL TRIFENATATE 1 PUFF: 100; 25 POWDER RESPIRATORY (INHALATION) at 09:03

## 2020-03-22 RX ADMIN — ENOXAPARIN SODIUM 40 MG: 100 INJECTION SUBCUTANEOUS at 10:03

## 2020-03-22 NOTE — PROGRESS NOTES
[facility] Hospital Medicine  Progress Note  This service was provided through telemedicine.    Patient Name: Kt Lutz  MRN: [id number]  Patient Class: IP- Inpatient   Admission Date: 3/12/2020  Length of Stay: 10 days  Attending Physician: Norma Hilario MD  Primary Care Provider: [facility] Medicine Team: [facility] [place] [facility]P MED U Norma Hilario MD    Subjective:     Principal Problem:Suspected Covid-19 Virus Infection    Interval History:   This service was provided through telemedicine.  Visit type: Virtual visit with synchronous audio and video  Kt Lutz was located: 650/650 A.   Start time:  9:55 AM  End time:  10:05 AM  Total time face-to-face spent in the care of the patient: 10 minutes  Noram Hilario MD provided this service with the assistance of: Keeley eMrcado LPN    Chief Complaint   Patient presents with    Shortness of Breath     cough, hx of COPD       Follow up visit for COVID-19 risk    History / Events Overnight: No significant events reported by Nursing.  Patient reports feeling better but continues to require O2, comfortable on 3 L NC.     Data reviewed 3/22/2020:  No leucopenia.    High Risk Conditions:  Patient has a condition that poses threat to life and bodily function:  COVID-19 risk    Review of Systems   Constitutional: Negative for fever.   Respiratory: Negative for cough and shortness of breath.      Objective:     Vital Signs (Most Recent):  Temp: 98.1 °F (36.7 °C) (03/22/20 0954)  Pulse: 98 (03/22/20 0956)  Resp: 20 (03/22/20 0954)  BP: 128/84 (03/22/20 0954)  SpO2: (!) 93 % (03/22/20 0956) Vital Signs (24h Range):  Temp:  [96.4 °F (35.8 °C)-98.4 °F (36.9 °C)] 98.1 °F (36.7 °C)  Pulse:  [68-98] 98  Resp:  [16-20] 20  SpO2:  [91 %-98 %] 93 %  BP: (119-136)/(64-92) 128/84     Weight: 113.9 kg (251 lb 1.7 oz)  Body mass index is 37.08 kg/m².  No intake or output data in the 24 hours ending 03/22/20 114   Physical Exam   Constitutional: He  is cooperative. No distress.   Eyes: Conjunctivae and lids are normal. No scleral icterus.   Cardiovascular: Normal rate and regular rhythm.   Auscultation performed by Telemedicine Nurse   Pulmonary/Chest: Effort normal. No accessory muscle usage. No tachypnea. No respiratory distress. He has rales.   Auscultation performed by Telemedicine Nurse   Abdominal: Normal appearance and bowel sounds are normal. He exhibits no distension.   Auscultation and palpation performed by Telemedicine Nurse   Musculoskeletal: He exhibits no edema.   Neurological: He is alert. He is not disoriented.   Skin: Skin is warm and dry. No rash noted. He is not diaphoretic. No cyanosis. No pallor.     Significant Labs:   A1C:   Recent Labs   Lab 03/12/20  1346   HGBA1C 6.0*     CBC:   Recent Labs   Lab 03/21/20  0420   WBC 10.02   HGB 15.3   HCT 52.3        CMP:   Recent Labs   Lab 03/21/20  0420      K 5.1   CL 96   CO2 35*      BUN 17   CREATININE 0.9   CALCIUM 9.0   PROT 6.9   ALBUMIN 3.3*   BILITOT 0.6   ALKPHOS 71   AST 17   ALT 25   ANIONGAP 7*   EGFRNONAA >60.0     Magnesium:   Recent Labs   Lab 03/21/20  0420   MG 2.2     TSH:   Recent Labs   Lab 03/12/20  1346   TSH 0.530     Significant Imaging:   CXR 3/12  FINDINGS:  Heart size is normal.  There is right basal consolidation versus atelectasis and mild interstitial edema versus pneumonia.  Heart size is upper normal.   Impression     Mild interstitial edema versus pneumonia.  A right lower or middle lobe pneumonia can not be excluded.     Assessment/Plan:      Active Diagnoses:    Diagnosis Date Noted POA    PRINCIPAL PROBLEM:  Suspected Covid-19 Virus Infection [R68.89] 03/15/2020 Yes    COPD exacerbation [J44.1] 03/15/2020 Yes    Acute respiratory failure with hypoxia and hypercapnia [J96.01, J96.02] 03/12/2020 Yes    COPD with acute exacerbation [J44.1] 02/04/2020 Yes    Essential hypertension [I10] 02/04/2020 Yes    Tobacco abuse [Z72.0] 02/04/2020  Yes      Problems Resolved During this Admission:       Overview / ICU Course:    54M with a PMHx of tobacco abuse (current active smoker/40 pack year hx), COPD (recently diagnosed on hospitalization 3 weeks ago), alcohol/drug abuse (in remission) presented with shortness of breath, malaise and dry cough for 2 weeks. Symptoms progressive since diagnosis. He denies fevers, chills, but does report headache. Patient was admitted to Hospital Medicine on 03/12/2020 for COPD exacerbation. On admission, oxygen saturation in low 80s and ABG with pCO2 in low 70s and patient initiated on BiPAP, Q4 nebs, vanc/zosyn, methylprednisolone, Breo and Spiriva. On and off BiPAP. pCO2 low 90s the next morning. Discussed need to remain on BiPAP today and BiPAP settings increased to 20/5.  Despite orders for continuous BiPAP, patient intermittently removing BiPAP to go outside and smoke a cigarette.  PCO2 remains elevated.  Pulmonology consult given patient's poor response to BiPAP efforts.  Despite intermittent BiPAP, the patient's PCO2 remained elevated. Mild interstitial edema versus pneumonia on CXR. Critical Care to move patient to the ICU for airway watch and COVID-19 testing.  Stepped down to  3/16, doing well, no supplemental oxygen. No cough, feels like nicotine patch is working well, no cravings. No major complaints. Lives at a group home run by the [employer], shares a room with 5 other older men. Medically ready for discharge but unsafe quarantine situation.     Inpatient Medications Prescribed for Management of Current Problems:     Scheduled Meds:    albuterol  4 puff Inhalation Q6H WAKE    enoxaparin  40 mg Subcutaneous Daily    fluticasone furoate-vilanteroL  1 puff Inhalation Daily    lisinopriL  20 mg Oral Daily    nicotine  1 patch Transdermal Daily    tiotropium  18 mcg Inhalation Daily     Continuous Infusions:   As Needed: Dextrose 10% Bolus, Dextrose 10% Bolus, glucagon (human recombinant), glucose,  glucose, sodium chloride 0.9%, traMADoL    Assessment and Plan by Problem    Person under investigation for COVID-19  Acute respiratory failure with hypoxemia and hypercapnia  - COVID-19 testing sent.  - Infection Control notified  - Isolation:   - Airborne and Droplet Precautions  - N95 masks must be fit tested, wear eye protection  - 20 second hand hygiene              - Limit visitors per hospital policy  - Diagnostics (leukopenia, hyponatremia, hyperferritinemia, elevated troponin, elevated d-dimer, age, and comorbidities are significant predictors of poor clinical outcome)              - CBC no lymphopenia, CMP WNL, D-dimer, ferritin, troponin, CRP, LDH, Procalcitonin not ordered              - ECG              - rapid Flu neg              - RIP neg              - Legionella antigen              - Blood culture x2              - Portable CXR -? PNA              - UA and culture  - Management:              - Supplemental O2 to maintain SpO2 >92%, taper as able              - Telemetry & Continuous Pulse Ox              - albuterol INHALER PRN (avoid nebulization of secretions)              - No BiPAP to avoid aerosolization (including home BiPAP)              - No steroids unless septic shock due to increased viral replication: patient received prednisone of COPD.              - fluid sparing resuscitation              - Empiric antibiotics per likely source & patient allergies                          - CAP: patient treated with Zosyn and levofloxacin.   - IS to bedside   - awaiting COVID testing for discharge disposition     Tobacco use disorder  - nicotine patch     TRACIE on BiPAP  Avoid at this time due to not being in a negative pressure room  Not on home O2; still requiring O2 at this time     Dispo  Homeless, staying at [facility] [employer].   - SW consulted    HIGH RISK CONDITION(S):   Patient has a condition that poses threat to life and bodily function: Respiratory Distress, Hypoxia    Discharge plan and  follow up  Home or Self Care  BLANCA 3/18/2020  Previous admission:  2/4/20  Goals of Care:  General Prognosis: good  Goals: Curative  Comfort Only: No  Hospice: No  Code Status: Full Code    Diet: Diet Adult Regular (IDDSI Level 7)  GI Prophylaxis: Not indicated  Significant LDAs:   IV Access Type: Peripheral  Urinary Catheter Indication if present: Patient Does Not Have Urinary Catheter  Other Lines/Tubes/Drains:    VTE Risk Mitigation (From admission, onward)         Ordered     enoxaparin injection 40 mg  Daily      03/17/20 1455     IP VTE HIGH RISK PATIENT  Once      03/12/20 1249                Norma Hilario MD  Department of Hospital Medicine   [facility]

## 2020-03-22 NOTE — PROGRESS NOTES
RAHEEM spoke to Mercedez Winkler ([phone]) with the [facility] location for the homeless population and was informed that the patient could come to the shelter. The patient would need to arrive with his prescriptions filled and a copy of his facesheet. She will arrange  when patient is discharged.

## 2020-03-23 VITALS
HEIGHT: 69 IN | RESPIRATION RATE: 20 BRPM | DIASTOLIC BLOOD PRESSURE: 60 MMHG | TEMPERATURE: 97 F | HEART RATE: 87 BPM | BODY MASS INDEX: 37.2 KG/M2 | WEIGHT: 251.13 LBS | SYSTOLIC BLOOD PRESSURE: 116 MMHG | OXYGEN SATURATION: 95 %

## 2020-03-23 LAB
ALBUMIN SERPL BCP-MCNC: 3.3 G/DL (ref 3.5–5.2)
ALP SERPL-CCNC: 67 U/L (ref 55–135)
ALT SERPL W/O P-5'-P-CCNC: 20 U/L (ref 10–44)
ANION GAP SERPL CALC-SCNC: 8 MMOL/L (ref 8–16)
AST SERPL-CCNC: 16 U/L (ref 10–40)
BASOPHILS # BLD AUTO: 0.03 K/UL (ref 0–0.2)
BASOPHILS NFR BLD: 0.4 % (ref 0–1.9)
BILIRUB SERPL-MCNC: 0.4 MG/DL (ref 0.1–1)
BUN SERPL-MCNC: 20 MG/DL (ref 6–20)
CALCIUM SERPL-MCNC: 9 MG/DL (ref 8.7–10.5)
CHLORIDE SERPL-SCNC: 97 MMOL/L (ref 95–110)
CO2 SERPL-SCNC: 33 MMOL/L (ref 23–29)
CREAT SERPL-MCNC: 0.9 MG/DL (ref 0.5–1.4)
CRP SERPL-MCNC: 13.3 MG/L (ref 0–8.2)
DIFFERENTIAL METHOD: ABNORMAL
EOSINOPHIL # BLD AUTO: 0.3 K/UL (ref 0–0.5)
EOSINOPHIL NFR BLD: 3.6 % (ref 0–8)
ERYTHROCYTE [DISTWIDTH] IN BLOOD BY AUTOMATED COUNT: 13.4 % (ref 11.5–14.5)
EST. GFR  (AFRICAN AMERICAN): >60 ML/MIN/1.73 M^2
EST. GFR  (NON AFRICAN AMERICAN): >60 ML/MIN/1.73 M^2
GLUCOSE SERPL-MCNC: 86 MG/DL (ref 70–110)
HCT VFR BLD AUTO: 51.9 % (ref 40–54)
HGB BLD-MCNC: 15.3 G/DL (ref 14–18)
IMM GRANULOCYTES # BLD AUTO: 0.09 K/UL (ref 0–0.04)
IMM GRANULOCYTES NFR BLD AUTO: 1.1 % (ref 0–0.5)
LYMPHOCYTES # BLD AUTO: 1.7 K/UL (ref 1–4.8)
LYMPHOCYTES NFR BLD: 20.5 % (ref 18–48)
MAGNESIUM SERPL-MCNC: 2.1 MG/DL (ref 1.6–2.6)
MCH RBC QN AUTO: 28.7 PG (ref 27–31)
MCHC RBC AUTO-ENTMCNC: 29.5 G/DL (ref 32–36)
MCV RBC AUTO: 97 FL (ref 82–98)
MONOCYTES # BLD AUTO: 1.1 K/UL (ref 0.3–1)
MONOCYTES NFR BLD: 12.7 % (ref 4–15)
NEUTROPHILS # BLD AUTO: 5.1 K/UL (ref 1.8–7.7)
NEUTROPHILS NFR BLD: 61.7 % (ref 38–73)
NRBC BLD-RTO: 0 /100 WBC
PHOSPHATE SERPL-MCNC: 4.4 MG/DL (ref 2.7–4.5)
PLATELET # BLD AUTO: 307 K/UL (ref 150–350)
PMV BLD AUTO: 10.2 FL (ref 9.2–12.9)
POTASSIUM SERPL-SCNC: 5.1 MMOL/L (ref 3.5–5.1)
PROT SERPL-MCNC: 6.8 G/DL (ref 6–8.4)
RBC # BLD AUTO: 5.34 M/UL (ref 4.6–6.2)
SODIUM SERPL-SCNC: 138 MMOL/L (ref 136–145)
WBC # BLD AUTO: 8.29 K/UL (ref 3.9–12.7)

## 2020-03-23 PROCEDURE — 99239 PR HOSPITAL DISCHARGE DAY,>30 MIN: ICD-10-PCS | Mod: ,,, | Performed by: INTERNAL MEDICINE

## 2020-03-23 PROCEDURE — 63600175 PHARM REV CODE 636 W HCPCS: Performed by: INTERNAL MEDICINE

## 2020-03-23 PROCEDURE — 85025 COMPLETE CBC W/AUTO DIFF WBC: CPT

## 2020-03-23 PROCEDURE — 94761 N-INVAS EAR/PLS OXIMETRY MLT: CPT

## 2020-03-23 PROCEDURE — 36415 COLL VENOUS BLD VENIPUNCTURE: CPT

## 2020-03-23 PROCEDURE — 86140 C-REACTIVE PROTEIN: CPT

## 2020-03-23 PROCEDURE — 25000003 PHARM REV CODE 250: Performed by: STUDENT IN AN ORGANIZED HEALTH CARE EDUCATION/TRAINING PROGRAM

## 2020-03-23 PROCEDURE — 11000001 HC ACUTE MED/SURG PRIVATE ROOM

## 2020-03-23 PROCEDURE — 99239 HOSP IP/OBS DSCHRG MGMT >30: CPT | Mod: ,,, | Performed by: INTERNAL MEDICINE

## 2020-03-23 PROCEDURE — S4991 NICOTINE PATCH NONLEGEND: HCPCS | Performed by: STUDENT IN AN ORGANIZED HEALTH CARE EDUCATION/TRAINING PROGRAM

## 2020-03-23 PROCEDURE — 84100 ASSAY OF PHOSPHORUS: CPT

## 2020-03-23 PROCEDURE — 27000221 HC OXYGEN, UP TO 24 HOURS

## 2020-03-23 PROCEDURE — 80053 COMPREHEN METABOLIC PANEL: CPT

## 2020-03-23 PROCEDURE — 94640 AIRWAY INHALATION TREATMENT: CPT

## 2020-03-23 PROCEDURE — 83735 ASSAY OF MAGNESIUM: CPT

## 2020-03-23 RX ORDER — LISINOPRIL 20 MG/1
20 TABLET ORAL DAILY
Qty: 30 TABLET | Refills: 1 | Status: ON HOLD | OUTPATIENT
Start: 2020-03-23 | End: 2021-03-18 | Stop reason: HOSPADM

## 2020-03-23 RX ORDER — BUDESONIDE AND FORMOTEROL FUMARATE DIHYDRATE 80; 4.5 UG/1; UG/1
2 AEROSOL RESPIRATORY (INHALATION) 2 TIMES DAILY
Qty: 10.2 G | Refills: 2 | Status: SHIPPED | OUTPATIENT
Start: 2020-03-23 | End: 2021-03-23

## 2020-03-23 RX ORDER — TIOTROPIUM BROMIDE 18 UG/1
18 CAPSULE ORAL; RESPIRATORY (INHALATION) DAILY
Qty: 30 CAPSULE | Refills: 3 | Status: SHIPPED | OUTPATIENT
Start: 2020-03-23 | End: 2020-05-22

## 2020-03-23 RX ORDER — ALBUTEROL SULFATE 90 UG/1
2 AEROSOL, METERED RESPIRATORY (INHALATION) EVERY 6 HOURS PRN
Qty: 18 G | Refills: 3 | Status: ON HOLD | OUTPATIENT
Start: 2020-03-23 | End: 2021-03-18 | Stop reason: SDUPTHER

## 2020-03-23 RX ORDER — IBUPROFEN 200 MG
1 TABLET ORAL DAILY
Qty: 28 PATCH | Refills: 0 | Status: SHIPPED | OUTPATIENT
Start: 2020-03-24

## 2020-03-23 RX ADMIN — ALBUTEROL SULFATE 4 PUFF: 90 AEROSOL, METERED RESPIRATORY (INHALATION) at 08:03

## 2020-03-23 RX ADMIN — ENOXAPARIN SODIUM 40 MG: 100 INJECTION SUBCUTANEOUS at 09:03

## 2020-03-23 RX ADMIN — NICOTINE 1 PATCH: 14 PATCH TRANSDERMAL at 09:03

## 2020-03-23 RX ADMIN — TRAMADOL HYDROCHLORIDE 50 MG: 50 TABLET, FILM COATED ORAL at 07:03

## 2020-03-23 RX ADMIN — FLUTICASONE FUROATE AND VILANTEROL TRIFENATATE 1 PUFF: 100; 25 POWDER RESPIRATORY (INHALATION) at 08:03

## 2020-03-23 RX ADMIN — LISINOPRIL 20 MG: 20 TABLET ORAL at 09:03

## 2020-03-23 RX ADMIN — ALBUTEROL SULFATE 4 PUFF: 90 AEROSOL, METERED RESPIRATORY (INHALATION) at 12:03

## 2020-03-23 NOTE — PLAN OF CARE
Pt AAOx4, VSS on 2L NC, in bed with upper siderails raised x2, bed in lowest/locked position, call light/personal belongings within reach. COVID19 test results pending, precautions maintained. Pt instructed to call for assistance. Pt verbalizes understanding.  Pt afebrile at this time.  Proper hand hygiene performed before and after pt care. Will continue to monitor patient

## 2020-03-23 NOTE — PLAN OF CARE
03/23/20 0948   Post-Acute Status   Post-Acute Authorization Other   Other Status No Post-Acute Service Needs     Pt is able to d.c today, CM leader Eduarda updated that Pt is able to d.c and will need transport to [facility] which has been dedicated to the homeless and group home populations as Pt resides at [facility]. Pt will need home o2 prior to d/c, has a COPD history.

## 2020-03-23 NOTE — PLAN OF CARE
RAHEEM received call from DELPHINE Poe following up on pt's discharge to [facility]. RAHEEM contacted Mercedez Thomasson ([phone]) to follow up on referral. RAHEEM informed Mercedez of patient. Mercedez states that she can accept the patient today. [facility] transport will be arranged by Mercedez for  in 2 hours. RAHEEM informed DELPHINE Poe of this information.     Jaci Morales, SWATI  [facility]

## 2020-03-23 NOTE — PLAN OF CARE
Sw is awaiting an email or call from Mercedez Winkler at  to be sure Pt can d/c today to [facility]. Portable o2 to be delivered to the room, nurse aware, staff as well. Pt has until 7pm which is the final pickup for that location for pt to d/c. On call SW/ANDI to assist if floor Sw has no answer prior to leaving for the day.

## 2020-03-23 NOTE — PROGRESS NOTES
Home Oxygen Evaluation    Date Performed: 3/23/2020    1) Patient's Home O2 Sat on room air, while at rest: 87%        If O2 sats on room air at rest are 88% or below, patient qualifies. No additional testing needed. Document N/A in steps 2 and 3. If 89% or above, complete steps 2.      2) Patient's O2 Sat on room air while exercising: n/a        If O2 sats on room air while exercising remain 89% or above patient does not qualify, no further testing needed Document N/A in step 3. If O2 sats on room air while exercising are 88% or below, continue to step 3.      3) Patient's O2 Sat while exercising on O2: n/a       (Must show improvement from #2 for patients to qualify)    If O2 sats improve on oxygen, patient qualifies for portable oxygen. If not, the patient does not qualify.

## 2020-03-23 NOTE — DISCHARGE SUMMARY
[facility] Hospital Medicine  Discharge Summary      Patient Name: Kt Lutz  MRN: [id number]  Admission Date: 3/12/2020  Hospital Length of Stay: 11 days  Discharge Date and Time: 3/24/2020  1:35 AM  Attending Physician: Norma Hilario MD   Discharging Provider: Norma Hilario MD  Primary Care Provider: [facility] Medicine Team: [facility] [place] HOSP MED D Norma Hilario MD    HPI: 54M with a PMHx of tobacco abuse (current active smoker/40 pack year hx), COPD (recently diagnosed on hospitalization 3 weeks ago), alcohol/drug abuse (in remission) presented with shortness of breath, malaise and dry cough for 2 weeks. Symptoms progressive since diagnosis. He denies fevers, chills, but does report headache.    * No surgery found *      Hospital Course:  Patient was admitted to Hospital Medicine on 03/12/2020 for COPD exacerbation. On admission, oxygen saturation in low 80s and ABG with pCO2 in low 70s and patient initiated on BiPAP, Q4 nebs, vanc/zosyn, methylprednisolone, Breo and Spiriva. On and off BiPAP. pCO2 low 90s the next morning. Discussed need to remain on BiPAP today and BiPAP settings increased to 20/5.  Despite orders for continuous BiPAP, patient intermittently removing BiPAP to go outside and smoke a cigarette.  PCO2 remains elevated.  Pulmonology consult given patient's poor response to BiPAP efforts.  Despite intermittent BiPAP, the patient's PCO2 remained elevated. Mild interstitial edema versus pneumonia on CXR. Critical Care to move patient to the ICU for airway watch and COVID-19 testing.  Stepped down to  3/16, doing well, no supplemental oxygen. No cough, feels like nicotine patch is working well, no cravings. No major complaints. Lives at a group home run by the [employer], shares a room with 5 other older men. Medically ready for discharge but unsafe quarantine situation.      Person under investigation for COVID-19  Acute respiratory failure with  hypoxemia and hypercapnia  - COVID-19 testing sent.  - Infection Control notified  - Isolation:   - Airborne and Droplet Precautions  - N95 masks must be fit tested, wear eye protection  - 20 second hand hygiene              - Limit visitors per hospital policy  - Diagnostics (leukopenia, hyponatremia, hyperferritinemia, elevated troponin, elevated d-dimer, age, and comorbidities are significant predictors of poor clinical outcome)              - CBC no lymphopenia, CMP WNL, D-dimer, ferritin, troponin, CRP, LDH, Procalcitonin not ordered              - ECG              - rapid Flu neg              - RIP neg              - Legionella antigen              - Blood culture x2              - Portable CXR -? PNA              - UA and culture  - Management:              - Supplemental O2 to maintain SpO2 >92%, taper as able              - Telemetry & Continuous Pulse Ox              - albuterol INHALER PRN (avoid nebulization of secretions)              - No BiPAP to avoid aerosolization (including home BiPAP)              - No steroids unless septic shock due to increased viral replication: patient received prednisone of COPD.              - fluid sparing resuscitation              - Empiric antibiotics per likely source & patient allergies                          - CAP: patient treated with Zosyn and levofloxacin.              - IS to bedside              - awaitedCOVID testing for discharge disposition     Tobacco use disorder  - nicotine patch     TRACIE previously on BiPAP  COPD  Avoid at this time due to not being in a negative pressure room  Not on home O2; still requiring O2 at this time    Hypertension  Continuing current management.    Consults:   Consults (From admission, onward)        Status Ordering Provider     Inpatient consult to Critical Care Medicine  Once     Provider:  (Not yet assigned)    Completed PORFIRIO CORTÉS        Final Active Diagnoses:    Diagnosis Date Noted POA    PRINCIPAL PROBLEM:   "Suspected Covid-19 Virus Infection [R68.89] 03/15/2020 Yes    COPD exacerbation [J44.1] 03/15/2020 Yes    Acute respiratory failure with hypoxia and hypercapnia [J96.01, J96.02] 03/12/2020 Yes    COPD with acute exacerbation [J44.1] 02/04/2020 Yes    Essential hypertension [I10] 02/04/2020 Yes    Tobacco abuse [Z72.0] 02/04/2020 Yes      Problems Resolved During this Admission:      Discharged Condition: stable    Disposition: Home or Self Care    Follow Up:  Follow-up Information     Call [facility] PULMONARY MEDICINE.    Specialty:  Pulmonology  Why:  For discharge from hospital follow up - COVID pending, COPD  Contact information:  Zara [address] [facility] [address] [phone]               Patient Instructions:      OXYGEN FOR HOME USE     Order Specific Question Answer Comments   Liter Flow 2    Duration Continuous    Qualifying SpO2: 87    Testing done at: Rest    Route nasal cannula    Device home concentrator with portable unit    Height: 5' 9" (1.753 m)    Weight: 113.9 kg (251 lb 1.7 oz)    Does patient have medical equipment at home? none    Alternative treatment measures have been tried or considered and deemed clinically ineffective. Yes      Ambulatory referral/consult to Pulmonology   Standing Status: Future   Referral Priority: Routine Referral Type: Consultation   Referral Reason: Specialty Services Required   Requested Specialty: Pulmonary Disease   Number of Visits Requested: 1     Diet Adult Regular     COVID-19 Home Symptom Monitoring  - Duration (days): 14     Order Specific Question Answer Comments   Duration (days) 14      Notify your health care provider if you experience any of the following:  temperature >100.4     Notify your health care provider if you experience any of the following:  persistent nausea and vomiting or diarrhea     Notify your health care provider if you experience any of the following:  difficulty breathing or increased cough     Notify your health " care provider if you experience any of the following:  severe persistent headache     Notify your health care provider if you experience any of the following:  persistent dizziness, light-headedness, or visual disturbances     Notify your health care provider if you experience any of the following:  increased confusion or weakness     COVID-19 Home Symptom Monitoring  - Duration (days): 14     Order Specific Question Answer Comments   Duration (days) 14      Activity as tolerated     Medications:  Reconciled Home Medications:      Medication List      START taking these medications    nicotine 14 mg/24 hr  Commonly known as:  NICODERM CQ  Place 1 patch onto the skin once daily.  Start taking on:  March 24, 2020        CONTINUE taking these medications    albuterol 90 mcg/actuation inhaler  Commonly known as:  PROVENTIL/VENTOLIN HFA  Inhale 2 puffs into the lungs every 6 (six) hours as needed for Wheezing or Shortness of Breath. Rescue     budesonide-formoterol 80-4.5 mcg 80-4.5 mcg/actuation Hfaa  Commonly known as:  SYMBICORT  Inhale 2 puffs into the lungs 2 (two) times daily. Controller     lisinopriL 20 MG tablet  Commonly known as:  PRINIVIL,ZESTRIL  Take 1 tablet (20 mg total) by mouth once daily.     tiotropium 18 mcg inhalation capsule  Commonly known as:  SPIRIVA  Inhale 1 capsule (18 mcg total) into the lungs once daily. Controller          Significant Diagnostic Studies:     Recent Labs   Lab 03/21/20 0420 03/23/20  0407   WBC 10.02 8.29   HGB 15.3 15.3   HCT 52.3 51.9    307     Recent Labs   Lab 03/21/20 0420 03/23/20  0407   GRAN 63.1  6.3 61.7  5.1   LYMPH 20.3  2.0 20.5  1.7   MONO 11.5  1.2* 12.7  1.1*   EOS 0.3 0.3     Recent Labs   Lab 03/21/20  0420 03/23/20  0407    138   K 5.1 5.1   CL 96 97   CO2 35* 33*   BUN 17 20   CREATININE 0.9 0.9    86   CALCIUM 9.0 9.0   MG 2.2 2.1   PHOS 4.0 4.4     Recent Labs   Lab 03/21/20  0420 03/23/20  0407   ALKPHOS 71 67   ALT 25 20    AST 17 16   ALBUMIN 3.3* 3.3*   PROT 6.9 6.8   BILITOT 0.6 0.4     Recent Labs   Lab 03/23/20  0407   CRP 13.3*     Recent Labs   Lab 02/04/20  1000 03/14/20  1408   PROCAL  --  <0.02   DDIMER 0.30  --      Recent Labs   Lab 03/12/20  1346   HGBA1C 6.0*     Microbiology:   Microbiology Results (last 7 days)     Procedure Component Value Units Date/Time    Blood culture [[id number]] Collected:  03/12/20 1738    Order Status:  Completed Specimen:  Blood from Peripheral, Hand, Right Updated:  03/17/20 2012     Blood Culture, Routine No growth after 5 days.    Blood culture [[id number]] Collected:  03/12/20 1738    Order Status:  Completed Specimen:  Blood from Peripheral, Hand, Left Updated:  03/17/20 2012     Blood Culture, Routine No growth after 5 days.        Pending Diagnostic Studies:     Procedure Component Value Units Date/Time    SARS- CoV-2 (COVID-19) QUALITATIVE PCR [[id number]] Collected:  03/14/20 1616    Order Status:  Sent Lab Status:  In process Updated:  03/14/20 1629    Specimen:  Nasopharyngeal         Indwelling Lines/Drains at time of discharge: none    Time spent on the discharge of patient: 40 minutes  Patient was seen and examined on the date of discharge and determined to be suitable for discharge.  This service was provided through telemedicine.  Visit type: Virtual visit with synchronous audio and video  Start time: 0946  The patient location is: 650/650 A  Present with the patient at the time of the telemedicine/virtual assessment: Praneeth Carrillo RN  End time: 0959  Total time spent with patient: 16 min  Time spent in care of the patient:  Additional time was spent counseling the patient on COVID-19 transmission prevention and coordinating care including speaking with case management, reviewing records.     Norma Hilario MD  Department of Hospital Medicine  [facility]

## 2020-03-23 NOTE — PROGRESS NOTES
"[facility]  Adult Nutrition  Progress Note    SUMMARY       Recommendations    Recommendation:   1. Continue regular diet, encourage good PO intake >50% of meals   2. Add boost plus if PO <50% of meals   3. Request new wt   RD to monitor and follow up     Goals: pt to tolereate >85% of EEN/EPN by RD follow up   Nutrition Goal Status: new  Communication of RD Recs: other (comment)(POC)    Reason for Assessment    Reason For Assessment: length of stay  Diagnosis: (COVID)  Relevant Medical History: COPD; HTN  Interdisciplinary Rounds: did not attend  General Information Comments: Pt reported good PO intake, % of meals at this time. States PTA good PO intake. No use of ONS reported at home. No c/o N/V/C/D reported at this time. Wt gain recently per pt, UBW ~220 lbs. Possibly inaccurate wt in chart, monitor changes. No s/s of malnutrition at this time. Due to recent hospital wide restrictions to limit the transfer of (COVID-19), we are not performing any physical exams at this time. All S/S will be observational; NFPE to be performed at a future date.  Nutrition Discharge Planning: adequate PO intake     Nutrition Risk Screen    Nutrition Risk Screen: no indicators present    Nutrition/Diet History    Spiritual, Cultural Beliefs, Moravian Practices, Values that Affect Care: no  Food Allergies: NKFA  Factors Affecting Nutritional Intake: None identified at this time    Anthropometrics    Temp: 97.3 °F (36.3 °C)  Height: 5' 9" (175.3 cm)  Height (inches): 69 in  Weight Method: Bed Scale  Weight: 113.9 kg (251 lb 1.7 oz)  Weight (lb): 251.11 lb  Ideal Body Weight (IBW), Male: 160 lb  % Ideal Body Weight, Male (lb): 153.84 %  BMI (Calculated): 37.1  BMI Grade: 35 - 39.9 - obesity - grade II    Lab/Procedures/Meds    Pertinent Labs Reviewed: reviewed  Pertinent Labs Comments: noted  Pertinent Medications Reviewed: reviewed  Pertinent Medications Comments: albuterol; lisinopril; " enoxaparin    Estimated/Assessed Needs    Weight Used For Calorie Calculations: 113.9 kg (251 lb 1.7 oz)  Energy Calorie Requirements (kcal): 2362 kcal/d  Energy Need Method: [place]-St Jeor(x1.2)  Protein Requirements:  g/day   Weight Used For Protein Calculations: 113.9 kg (251 lb 1.7 oz)  Fluid Requirements (mL): 1 mL/kcal or per MD  RDA Method (mL): 2362    Nutrition Prescription Ordered    Current Diet Order: Regular diet     Evaluation of Received Nutrient/Fluid Intake    Energy Calories Required: meeting needs  Protein Required: meeting needs  Fluid Required: meeting needs  Comments: LBM 3/21  Tolerance: tolerating  % Intake of Estimated Energy Needs: 75 - 100 %  % Meal Intake: 75 - 100 %    Nutrition Risk    Level of Risk/Frequency of Follow-up: low     Assessment and Plan    Nutrition Problem  obesity    Related to (etiology):   Excessive energy intake    Signs and Symptoms (as evidenced by):   BMI of 37     Interventions  (treatment strategy):  Collaboration of care with other providers    Nutrition Diagnosis Status:   New    Monitor and Evaluation    Food and Nutrient Intake: energy intake, food and beverage intake  Food and Nutrient Adminstration: diet order  Knowledge/Beliefs/Attitudes: food and nutrition knowledge/skill  Anthropometric Measurements: weight, weight change  Biochemical Data, Medical Tests and Procedures: lipid profile, electrolyte and renal panel, gastrointestinal profile, glucose/endocrine profile, inflammatory profile  Nutrition-Focused Physical Findings: overall appearance     Nutrition Follow-Up    RD Follow-up?: Yes

## 2020-03-23 NOTE — PROGRESS NOTES
Portable oxygen and Rx x4 delivered to unit.  Waiting to hear from case management about transportation.

## 2020-03-23 NOTE — PLAN OF CARE
03/23/20 1531   Final Note   Assessment Type Final Discharge Note   Anticipated Discharge Disposition   ([place] )   Hospital Follow Up  Appt(s) scheduled? No   Discharge plans and expectations educations in teach back method with documentation complete? Yes   Right Care Referral Info   Post Acute Recommendation No Care       Rain Burrows RN, BSN     Ext [phone]

## 2020-03-23 NOTE — PLAN OF CARE
Recommendations    Recommendation:   1. Continue regular diet, encourage good PO intake >50% of meals   2. Add boost plus if PO <50% of meals   3. Request new wt   RD to monitor and follow up     Goals: pt to tolereate >85% of EEN/EPN by RD follow up   Nutrition Goal Status: new  Communication of RD Recs: other (comment)(POC)

## 2020-03-24 ENCOUNTER — PATIENT MESSAGE (OUTPATIENT)
Dept: HEPATOLOGY | Facility: HOSPITAL | Age: 55
End: 2020-03-24

## 2020-03-24 LAB — SARS-COV-2 RNA RESP QL NAA+PROBE: NOT DETECTED

## 2020-03-24 NOTE — PROGRESS NOTES
[facility] Hospital Medicine  Progress Note  This service was provided through telemedicine.    Patient Name: Kt Lutz  MRN: [id number]  Patient Class: IP- Inpatient   Admission Date: 3/12/2020  Length of Stay: 11 days  Attending Physician: Norma Hilario MD  Primary Care Provider: Primary Doctor [facility] Medicine Team: [facility] [place] Norma Hilario MD    Subjective:     Principal Problem:Suspected Covid-19 Virus Infection    Interval History:   This service was provided through telemedicine.  Visit type: Virtual visit with synchronous audio and video  Kt Lutz was located: 650/650 A.   Start time:  9:46 AM  End time:  9:59 AM  Total time face-to-face spent in the care of the patient: 16 minutes  Norma Hilario MD provided this service with the assistance of: Praneeth Carrillo RN    Chief Complaint   Patient presents with    Shortness of Breath     cough, hx of COPD       Follow up visit for COVID-19 risk    History / Events Overnight: No significant events reported by Nursing.  Patient reports feeling better but continues to require O2, comfortable on 2 L NC.     Data reviewed 3/23/2020:  No leucopenia.    High Risk Conditions:  Patient has a condition that poses threat to life and bodily function:  COVID-19 risk    Review of Systems   Constitutional: Negative for fever.   Respiratory: Negative for cough and shortness of breath.      Objective:     Vital Signs (Most Recent):  Temp: 97.4 °F (36.3 °C) (03/23/20 1944)  Pulse: 87 (03/23/20 2000)  Resp: 20 (03/23/20 2000)  BP: 116/60 (03/23/20 1944)  SpO2: 95 % (03/23/20 2000) Vital Signs (24h Range):  Temp:  [97.3 °F (36.3 °C)-98.9 °F (37.2 °C)] 97.4 °F (36.3 °C)  Pulse:  [81-99] 87  Resp:  [12-20] 20  SpO2:  [91 %-99 %] 95 %  BP: (116-133)/(60-79) 116/60     Weight: 113.9 kg (251 lb 1.7 oz)  Body mass index is 37.08 kg/m².    Intake/Output Summary (Last 24 hours) at 3/23/2020 2126  Last data filed at 3/23/2020 1950  Gross  per 24 hour   Intake 540 ml   Output --   Net 540 ml      Physical Exam   Constitutional: He is cooperative. No distress.   Eyes: Conjunctivae and lids are normal. No scleral icterus.   Cardiovascular: Normal rate and regular rhythm.   Auscultation performed by Telemedicine Nurse   Pulmonary/Chest: Effort normal. No accessory muscle usage. No tachypnea. No respiratory distress. He has decreased breath sounds in the left lower field.   Auscultation performed by Telemedicine Nurse   Abdominal: Normal appearance and bowel sounds are normal. He exhibits no distension.   Auscultation and palpation performed by Telemedicine Nurse   Musculoskeletal: He exhibits no edema.   Neurological: He is alert. He is not disoriented.   Skin: Skin is warm and dry. No rash noted. He is not diaphoretic. No cyanosis. No pallor.     Significant Labs:   A1C:   Recent Labs   Lab 03/12/20  1346   HGBA1C 6.0*     CBC:   Recent Labs   Lab 03/23/20  0407   WBC 8.29   HGB 15.3   HCT 51.9        CMP:   Recent Labs   Lab 03/23/20  0407      K 5.1   CL 97   CO2 33*   GLU 86   BUN 20   CREATININE 0.9   CALCIUM 9.0   PROT 6.8   ALBUMIN 3.3*   BILITOT 0.4   ALKPHOS 67   AST 16   ALT 20   ANIONGAP 8   EGFRNONAA >60.0     Magnesium:   Recent Labs   Lab 03/23/20  0407   MG 2.1     TSH:   Recent Labs   Lab 03/12/20  1346   TSH 0.530     Significant Imaging:   CXR 3/12  FINDINGS:  Heart size is normal.  There is right basal consolidation versus atelectasis and mild interstitial edema versus pneumonia.  Heart size is upper normal.   Impression     Mild interstitial edema versus pneumonia.  A right lower or middle lobe pneumonia can not be excluded.     Assessment/Plan:      Active Diagnoses:    Diagnosis Date Noted POA    PRINCIPAL PROBLEM:  Suspected Covid-19 Virus Infection [R68.89] 03/15/2020 Yes    COPD exacerbation [J44.1] 03/15/2020 Yes    Acute respiratory failure with hypoxia and hypercapnia [J96.01, J96.02] 03/12/2020 Yes    COPD  with acute exacerbation [J44.1] 02/04/2020 Yes    Essential hypertension [I10] 02/04/2020 Yes    Tobacco abuse [Z72.0] 02/04/2020 Yes      Problems Resolved During this Admission:       Overview / ICU Course:    54M with a PMHx of tobacco abuse (current active smoker/40 pack year hx), COPD (recently diagnosed on hospitalization 3 weeks ago), alcohol/drug abuse (in remission) presented with shortness of breath, malaise and dry cough for 2 weeks. Symptoms progressive since diagnosis. He denies fevers, chills, but does report headache. Patient was admitted to Hospital Medicine on 03/12/2020 for COPD exacerbation. On admission, oxygen saturation in low 80s and ABG with pCO2 in low 70s and patient initiated on BiPAP, Q4 nebs, vanc/zosyn, methylprednisolone, Breo and Spiriva. On and off BiPAP. pCO2 low 90s the next morning. Discussed need to remain on BiPAP today and BiPAP settings increased to 20/5.  Despite orders for continuous BiPAP, patient intermittently removing BiPAP to go outside and smoke a cigarette.  PCO2 remains elevated.  Pulmonology consult given patient's poor response to BiPAP efforts.  Despite intermittent BiPAP, the patient's PCO2 remained elevated. Mild interstitial edema versus pneumonia on CXR. Critical Care to move patient to the ICU for airway watch and COVID-19 testing.  Stepped down to  3/16, doing well, no supplemental oxygen. No cough, feels like nicotine patch is working well, no cravings. No major complaints. Lives at a group home run by the [employer], shares a room with 5 other older men. Medically ready for discharge but unsafe quarantine situation.     Inpatient Medications Prescribed for Management of Current Problems:     Scheduled Meds:    albuterol  4 puff Inhalation Q6H WAKE    enoxaparin  40 mg Subcutaneous Daily    fluticasone furoate-vilanteroL  1 puff Inhalation Daily    lisinopriL  20 mg Oral Daily    nicotine  1 patch Transdermal Daily    tiotropium  18 mcg  Inhalation Daily     Continuous Infusions:   As Needed: Dextrose 10% Bolus, Dextrose 10% Bolus, glucagon (human recombinant), glucose, glucose, sodium chloride 0.9%, traMADoL    Assessment and Plan by Problem    Person under investigation for COVID-19  Acute respiratory failure with hypoxemia and hypercapnia  - COVID-19 testing sent.  - Infection Control notified  - Isolation:   - Airborne and Droplet Precautions  - N95 masks must be fit tested, wear eye protection  - 20 second hand hygiene              - Limit visitors per hospital policy  - Diagnostics (leukopenia, hyponatremia, hyperferritinemia, elevated troponin, elevated d-dimer, age, and comorbidities are significant predictors of poor clinical outcome)              - CBC no lymphopenia, CMP WNL, D-dimer, ferritin, troponin, CRP, LDH, Procalcitonin not ordered              - ECG              - rapid Flu neg              - RIP neg              - Legionella antigen              - Blood culture x2              - Portable CXR -? PNA              - UA and culture  - Management:              - Supplemental O2 to maintain SpO2 >92%, taper as able              - Telemetry & Continuous Pulse Ox              - albuterol INHALER PRN (avoid nebulization of secretions)              - No BiPAP to avoid aerosolization (including home BiPAP)              - No steroids unless septic shock due to increased viral replication: patient received prednisone of COPD.              - fluid sparing resuscitation              - Empiric antibiotics per likely source & patient allergies                          - CAP: patient treated with Zosyn and levofloxacin.   - IS to bedside   - awaiting COVID testing for discharge disposition     Tobacco use disorder  - nicotine patch     TRACIE on BiPAP  Avoid at this time due to not being in a negative pressure room  Not on home O2; still requiring O2 at this time     Dispo  [facility]   - SW consulted    HIGH RISK  CONDITION(S):   Patient has a condition that poses threat to life and bodily function: Respiratory Distress, Hypoxia    Discharge plan and follow up  Home or Self Care  BLANCA 3/23/2020  Previous admission:  2/4/20  Goals of Care:  General Prognosis: good  Goals: Curative  Comfort Only: No  Hospice: No  Code Status: Full Code    Diet: Diet Adult Regular (IDDSI Level 7)  Diet Adult Regular  GI Prophylaxis: Not indicated  Significant LDAs:   IV Access Type: Peripheral  Urinary Catheter Indication if present: Patient Does Not Have Urinary Catheter  Other Lines/Tubes/Drains:    VTE Risk Mitigation (From admission, onward)         Ordered     enoxaparin injection 40 mg  Daily      03/17/20 1455     IP VTE HIGH RISK PATIENT  Once      03/12/20 1249                Norma Hilario MD  Department of Hospital Medicine   [facility]

## 2020-03-24 NOTE — TELEPHONE ENCOUNTER
Your test was NEGATIVE for COVID-19 (coronavirus).  If you still have symptoms, treat with rest, fluids, and over-the-counter medications.  Continue to stay home, avoid large crowds, and practice proper handwashing.     If your symptoms worsen or if you have any other concerns, please contact [facility] at [phone].     Sincerely,    Norma Hilario MD

## 2020-03-26 ENCOUNTER — PATIENT OUTREACH (OUTPATIENT)
Dept: ADMINISTRATIVE | Facility: CLINIC | Age: 55
End: 2020-03-26

## 2020-03-26 NOTE — PROGRESS NOTES
Please forward this important TCC information to your provider in order to maximize the post discharge care delivery of this patient.    C3 nurse spoke with Kt Lutz  for a TCC post hospital discharge follow up call. The patient does not have a scheduled HOSFU appointment with Primary Doctor No within 7-14 days post hospital discharge date 03/24/2020. C3 nurse was unable to schedule HOSFU appointment in [facility].  Please contact patient and schedule follow up appointment using HOSFU visit type on or before 04/07/2020.    Respectfully,    Shayla Blum LPN    [facility]    [email or web address]       Please do not reply to this message, as this inbox is not routinely monitored.

## 2020-03-26 NOTE — PATIENT INSTRUCTIONS

## 2020-05-06 ENCOUNTER — TELEPHONE (OUTPATIENT)
Dept: PULMONOLOGY | Facility: CLINIC | Age: 55
End: 2020-05-06

## 2020-05-06 NOTE — TELEPHONE ENCOUNTER
I called and spoke to Mr Lutz about a followup appointment after being in the hospital. He said he spent 2-3 weeks in both the [facility] and [facility]. He thinks he was covid negative. He needs refills on Breo, Spiriva, Albuterol HFA and Lisinopril. He Is living @ [address] [place] [address]. He does not have a car, He will ride the bus to come for a appointment. I told him I will call him back tomorrow with a appointment. Candida Stevens LPN

## 2021-03-16 ENCOUNTER — HOSPITAL ENCOUNTER (INPATIENT)
Facility: HOSPITAL | Age: 56
LOS: 3 days | Discharge: HOME | DRG: 193 | End: 2021-03-19
Attending: EMERGENCY MEDICINE | Admitting: INTERNAL MEDICINE
Payer: MEDICAID

## 2021-03-16 DIAGNOSIS — J44.1 COPD WITH ACUTE EXACERBATION: Primary | ICD-10-CM

## 2021-03-16 DIAGNOSIS — Z01.84 COVID-19 VIRUS IGG ANTIBODY NOT DETECTED: ICD-10-CM

## 2021-03-16 DIAGNOSIS — Z72.0 TOBACCO ABUSE: ICD-10-CM

## 2021-03-16 DIAGNOSIS — Z87.891 SMOKING HISTORY: ICD-10-CM

## 2021-03-16 DIAGNOSIS — J96.01 ACUTE RESPIRATORY FAILURE WITH HYPOXIA: ICD-10-CM

## 2021-03-16 DIAGNOSIS — R06.02 SOB (SHORTNESS OF BREATH): ICD-10-CM

## 2021-03-16 DIAGNOSIS — J44.1 COPD EXACERBATION: ICD-10-CM

## 2021-03-16 DIAGNOSIS — Z87.09 HISTORY OF COPD: ICD-10-CM

## 2021-03-16 DIAGNOSIS — Z99.81 O2 DEPENDENT: ICD-10-CM

## 2021-03-16 PROBLEM — I10 ESSENTIAL HYPERTENSION: Status: RESOLVED | Noted: 2020-02-04 | Resolved: 2021-03-16

## 2021-03-16 PROBLEM — Z20.822 SUSPECTED COVID-19 VIRUS INFECTION: Status: RESOLVED | Noted: 2020-03-15 | Resolved: 2021-03-16

## 2021-03-16 PROBLEM — J18.9 PNA (PNEUMONIA): Status: ACTIVE | Noted: 2021-03-16

## 2021-03-16 LAB
ALBUMIN SERPL BCP-MCNC: 3.6 G/DL (ref 3.5–5.2)
ALP SERPL-CCNC: 71 U/L (ref 55–135)
ALT SERPL W/O P-5'-P-CCNC: 10 U/L (ref 10–44)
ANION GAP SERPL CALC-SCNC: 12 MMOL/L (ref 8–16)
AST SERPL-CCNC: 14 U/L (ref 10–40)
BASOPHILS # BLD AUTO: 0.04 K/UL (ref 0–0.2)
BASOPHILS NFR BLD: 0.4 % (ref 0–1.9)
BILIRUB SERPL-MCNC: 0.6 MG/DL (ref 0.1–1)
BNP SERPL-MCNC: 30 PG/ML (ref 0–99)
BUN SERPL-MCNC: 10 MG/DL (ref 6–20)
CALCIUM SERPL-MCNC: 8.6 MG/DL (ref 8.7–10.5)
CHLORIDE SERPL-SCNC: 102 MMOL/L (ref 95–110)
CO2 SERPL-SCNC: 28 MMOL/L (ref 23–29)
CREAT SERPL-MCNC: 0.9 MG/DL (ref 0.5–1.4)
CTP QC/QA: YES
DIFFERENTIAL METHOD: ABNORMAL
EOSINOPHIL # BLD AUTO: 0.1 K/UL (ref 0–0.5)
EOSINOPHIL NFR BLD: 0.6 % (ref 0–8)
ERYTHROCYTE [DISTWIDTH] IN BLOOD BY AUTOMATED COUNT: 13.4 % (ref 11.5–14.5)
EST. GFR  (AFRICAN AMERICAN): >60 ML/MIN/1.73 M^2
EST. GFR  (NON AFRICAN AMERICAN): >60 ML/MIN/1.73 M^2
GLUCOSE SERPL-MCNC: 107 MG/DL (ref 70–110)
HCT VFR BLD AUTO: 50.1 % (ref 40–54)
HGB BLD-MCNC: 15.4 G/DL (ref 14–18)
IMM GRANULOCYTES # BLD AUTO: 0.05 K/UL (ref 0–0.04)
IMM GRANULOCYTES NFR BLD AUTO: 0.5 % (ref 0–0.5)
LYMPHOCYTES # BLD AUTO: 0.5 K/UL (ref 1–4.8)
LYMPHOCYTES NFR BLD: 5.7 % (ref 18–48)
MCH RBC QN AUTO: 29.2 PG (ref 27–31)
MCHC RBC AUTO-ENTMCNC: 30.7 G/DL (ref 32–36)
MCV RBC AUTO: 95 FL (ref 82–98)
MONOCYTES # BLD AUTO: 0.3 K/UL (ref 0.3–1)
MONOCYTES NFR BLD: 3.5 % (ref 4–15)
NEUTROPHILS # BLD AUTO: 8.4 K/UL (ref 1.8–7.7)
NEUTROPHILS NFR BLD: 89.3 % (ref 38–73)
NRBC BLD-RTO: 0 /100 WBC
PLATELET # BLD AUTO: 317 K/UL (ref 150–350)
PMV BLD AUTO: 10.5 FL (ref 9.2–12.9)
POTASSIUM SERPL-SCNC: 3.9 MMOL/L (ref 3.5–5.1)
PROCALCITONIN SERPL IA-MCNC: <0.02 NG/ML
PROT SERPL-MCNC: 6.9 G/DL (ref 6–8.4)
RBC # BLD AUTO: 5.28 M/UL (ref 4.6–6.2)
SARS-COV-2 RDRP RESP QL NAA+PROBE: NEGATIVE
SODIUM SERPL-SCNC: 142 MMOL/L (ref 136–145)
TROPONIN I SERPL DL<=0.01 NG/ML-MCNC: <0.006 NG/ML (ref 0–0.03)
WBC # BLD AUTO: 9.38 K/UL (ref 3.9–12.7)

## 2021-03-16 PROCEDURE — 96367 TX/PROPH/DG ADDL SEQ IV INF: CPT

## 2021-03-16 PROCEDURE — 84145 PROCALCITONIN (PCT): CPT | Performed by: HOSPITALIST

## 2021-03-16 PROCEDURE — 99222 1ST HOSP IP/OBS MODERATE 55: CPT | Mod: ,,, | Performed by: HOSPITALIST

## 2021-03-16 PROCEDURE — 99285 EMERGENCY DEPT VISIT HI MDM: CPT | Mod: 25

## 2021-03-16 PROCEDURE — 25000242 PHARM REV CODE 250 ALT 637 W/ HCPCS: Performed by: HOSPITALIST

## 2021-03-16 PROCEDURE — 86803 HEPATITIS C AB TEST: CPT | Performed by: EMERGENCY MEDICINE

## 2021-03-16 PROCEDURE — 84484 ASSAY OF TROPONIN QUANT: CPT | Performed by: EMERGENCY MEDICINE

## 2021-03-16 PROCEDURE — 96375 TX/PRO/DX INJ NEW DRUG ADDON: CPT

## 2021-03-16 PROCEDURE — 93010 EKG 12-LEAD: ICD-10-PCS | Mod: ,,, | Performed by: INTERNAL MEDICINE

## 2021-03-16 PROCEDURE — 25500020 PHARM REV CODE 255: Performed by: EMERGENCY MEDICINE

## 2021-03-16 PROCEDURE — 94761 N-INVAS EAR/PLS OXIMETRY MLT: CPT

## 2021-03-16 PROCEDURE — 99222 PR INITIAL HOSPITAL CARE,LEVL II: ICD-10-PCS | Mod: ,,, | Performed by: HOSPITALIST

## 2021-03-16 PROCEDURE — 93010 ELECTROCARDIOGRAM REPORT: CPT | Mod: ,,, | Performed by: INTERNAL MEDICINE

## 2021-03-16 PROCEDURE — 99285 PR EMERGENCY DEPT VISIT,LEVEL V: ICD-10-PCS | Mod: CR,CS,, | Performed by: EMERGENCY MEDICINE

## 2021-03-16 PROCEDURE — 99285 EMERGENCY DEPT VISIT HI MDM: CPT | Mod: CR,CS,, | Performed by: EMERGENCY MEDICINE

## 2021-03-16 PROCEDURE — 63600175 PHARM REV CODE 636 W HCPCS: Performed by: EMERGENCY MEDICINE

## 2021-03-16 PROCEDURE — U0002 COVID-19 LAB TEST NON-CDC: HCPCS | Performed by: EMERGENCY MEDICINE

## 2021-03-16 PROCEDURE — 80053 COMPREHEN METABOLIC PANEL: CPT | Performed by: EMERGENCY MEDICINE

## 2021-03-16 PROCEDURE — 86703 HIV-1/HIV-2 1 RESULT ANTBDY: CPT | Performed by: EMERGENCY MEDICINE

## 2021-03-16 PROCEDURE — 83880 ASSAY OF NATRIURETIC PEPTIDE: CPT | Performed by: EMERGENCY MEDICINE

## 2021-03-16 PROCEDURE — 27000221 HC OXYGEN, UP TO 24 HOURS

## 2021-03-16 PROCEDURE — 85025 COMPLETE CBC W/AUTO DIFF WBC: CPT | Performed by: EMERGENCY MEDICINE

## 2021-03-16 PROCEDURE — 12000002 HC ACUTE/MED SURGE SEMI-PRIVATE ROOM

## 2021-03-16 PROCEDURE — 99900035 HC TECH TIME PER 15 MIN (STAT)

## 2021-03-16 PROCEDURE — 25000003 PHARM REV CODE 250: Performed by: EMERGENCY MEDICINE

## 2021-03-16 PROCEDURE — 25000242 PHARM REV CODE 250 ALT 637 W/ HCPCS: Performed by: EMERGENCY MEDICINE

## 2021-03-16 PROCEDURE — 93005 ELECTROCARDIOGRAM TRACING: CPT

## 2021-03-16 PROCEDURE — 96365 THER/PROPH/DIAG IV INF INIT: CPT

## 2021-03-16 PROCEDURE — 94640 AIRWAY INHALATION TREATMENT: CPT

## 2021-03-16 RX ORDER — CEFEPIME HYDROCHLORIDE 2 G/1
2 INJECTION, POWDER, FOR SOLUTION INTRAVENOUS
Status: COMPLETED | OUTPATIENT
Start: 2021-03-16 | End: 2021-03-16

## 2021-03-16 RX ORDER — SODIUM CHLORIDE 0.9 % (FLUSH) 0.9 %
5 SYRINGE (ML) INJECTION
Status: DISCONTINUED | OUTPATIENT
Start: 2021-03-17 | End: 2021-03-19 | Stop reason: HOSPADM

## 2021-03-16 RX ORDER — ACETAMINOPHEN 325 MG/1
325 TABLET ORAL EVERY 8 HOURS PRN
Status: DISCONTINUED | OUTPATIENT
Start: 2021-03-17 | End: 2021-03-19 | Stop reason: HOSPADM

## 2021-03-16 RX ORDER — IBUPROFEN 200 MG
16 TABLET ORAL
Status: DISCONTINUED | OUTPATIENT
Start: 2021-03-17 | End: 2021-03-19 | Stop reason: HOSPADM

## 2021-03-16 RX ORDER — POLYETHYLENE GLYCOL 3350 17 G/17G
17 POWDER, FOR SOLUTION ORAL 2 TIMES DAILY PRN
Status: DISCONTINUED | OUTPATIENT
Start: 2021-03-17 | End: 2021-03-19 | Stop reason: HOSPADM

## 2021-03-16 RX ORDER — ONDANSETRON 2 MG/ML
4 INJECTION INTRAMUSCULAR; INTRAVENOUS EVERY 8 HOURS PRN
Status: DISCONTINUED | OUTPATIENT
Start: 2021-03-17 | End: 2021-03-19 | Stop reason: HOSPADM

## 2021-03-16 RX ORDER — TALC
6 POWDER (GRAM) TOPICAL NIGHTLY PRN
Status: DISCONTINUED | OUTPATIENT
Start: 2021-03-17 | End: 2021-03-19 | Stop reason: HOSPADM

## 2021-03-16 RX ORDER — PREDNISONE 20 MG/1
60 TABLET ORAL
Status: COMPLETED | OUTPATIENT
Start: 2021-03-16 | End: 2021-03-16

## 2021-03-16 RX ORDER — IBUPROFEN 200 MG
1 TABLET ORAL DAILY
Status: DISCONTINUED | OUTPATIENT
Start: 2021-03-17 | End: 2021-03-19 | Stop reason: HOSPADM

## 2021-03-16 RX ORDER — ONDANSETRON 4 MG/1
4 TABLET, ORALLY DISINTEGRATING ORAL EVERY 8 HOURS PRN
Status: DISCONTINUED | OUTPATIENT
Start: 2021-03-17 | End: 2021-03-19 | Stop reason: HOSPADM

## 2021-03-16 RX ORDER — HYDRALAZINE HYDROCHLORIDE 25 MG/1
25 TABLET, FILM COATED ORAL EVERY 8 HOURS PRN
Status: DISCONTINUED | OUTPATIENT
Start: 2021-03-16 | End: 2021-03-19 | Stop reason: HOSPADM

## 2021-03-16 RX ORDER — CEFEPIME HYDROCHLORIDE 1 G/1
1 INJECTION, POWDER, FOR SOLUTION INTRAMUSCULAR; INTRAVENOUS
Status: DISCONTINUED | OUTPATIENT
Start: 2021-03-17 | End: 2021-03-17

## 2021-03-16 RX ORDER — MAGNESIUM SULFATE HEPTAHYDRATE 40 MG/ML
2 INJECTION, SOLUTION INTRAVENOUS
Status: COMPLETED | OUTPATIENT
Start: 2021-03-16 | End: 2021-03-16

## 2021-03-16 RX ORDER — PREDNISONE 10 MG/1
40 TABLET ORAL DAILY
Status: DISCONTINUED | OUTPATIENT
Start: 2021-03-17 | End: 2021-03-17

## 2021-03-16 RX ORDER — IPRATROPIUM BROMIDE AND ALBUTEROL SULFATE 2.5; .5 MG/3ML; MG/3ML
3 SOLUTION RESPIRATORY (INHALATION)
Status: DISCONTINUED | OUTPATIENT
Start: 2021-03-16 | End: 2021-03-19 | Stop reason: HOSPADM

## 2021-03-16 RX ORDER — AZITHROMYCIN 250 MG/1
250 TABLET, FILM COATED ORAL DAILY
Status: DISCONTINUED | OUTPATIENT
Start: 2021-03-17 | End: 2021-03-19 | Stop reason: HOSPADM

## 2021-03-16 RX ORDER — IBUPROFEN 200 MG
24 TABLET ORAL
Status: DISCONTINUED | OUTPATIENT
Start: 2021-03-17 | End: 2021-03-19 | Stop reason: HOSPADM

## 2021-03-16 RX ORDER — ENOXAPARIN SODIUM 100 MG/ML
40 INJECTION SUBCUTANEOUS EVERY 24 HOURS
Status: DISCONTINUED | OUTPATIENT
Start: 2021-03-17 | End: 2021-03-19 | Stop reason: HOSPADM

## 2021-03-16 RX ORDER — IPRATROPIUM BROMIDE AND ALBUTEROL SULFATE 2.5; .5 MG/3ML; MG/3ML
3 SOLUTION RESPIRATORY (INHALATION)
Status: COMPLETED | OUTPATIENT
Start: 2021-03-16 | End: 2021-03-16

## 2021-03-16 RX ORDER — GLUCAGON 1 MG
1 KIT INJECTION
Status: DISCONTINUED | OUTPATIENT
Start: 2021-03-17 | End: 2021-03-19 | Stop reason: HOSPADM

## 2021-03-16 RX ADMIN — PREDNISONE 60 MG: 20 TABLET ORAL at 04:03

## 2021-03-16 RX ADMIN — IOHEXOL 100 ML: 350 INJECTION, SOLUTION INTRAVENOUS at 06:03

## 2021-03-16 RX ADMIN — IPRATROPIUM BROMIDE AND ALBUTEROL SULFATE 3 ML: .5; 2.5 SOLUTION RESPIRATORY (INHALATION) at 03:03

## 2021-03-16 RX ADMIN — MAGNESIUM SULFATE 2 G: 2 INJECTION INTRAVENOUS at 04:03

## 2021-03-16 RX ADMIN — CEFEPIME 2 G: 2 INJECTION, POWDER, FOR SOLUTION INTRAVENOUS at 07:03

## 2021-03-16 RX ADMIN — AZITHROMYCIN MONOHYDRATE 500 MG: 500 INJECTION, POWDER, LYOPHILIZED, FOR SOLUTION INTRAVENOUS at 07:03

## 2021-03-16 RX ADMIN — IPRATROPIUM BROMIDE AND ALBUTEROL SULFATE 3 ML: .5; 2.5 SOLUTION RESPIRATORY (INHALATION) at 10:03

## 2021-03-17 LAB
ANION GAP SERPL CALC-SCNC: 9 MMOL/L (ref 8–16)
BASOPHILS # BLD AUTO: 0.01 K/UL (ref 0–0.2)
BASOPHILS NFR BLD: 0.1 % (ref 0–1.9)
BUN SERPL-MCNC: 14 MG/DL (ref 6–20)
CALCIUM SERPL-MCNC: 8.3 MG/DL (ref 8.7–10.5)
CHLORIDE SERPL-SCNC: 104 MMOL/L (ref 95–110)
CO2 SERPL-SCNC: 30 MMOL/L (ref 23–29)
CREAT SERPL-MCNC: 0.8 MG/DL (ref 0.5–1.4)
CRP SERPL-MCNC: 3.1 MG/L (ref 0–8.2)
D DIMER PPP IA.FEU-MCNC: <0.19 MG/L FEU
DIFFERENTIAL METHOD: ABNORMAL
EOSINOPHIL # BLD AUTO: 0 K/UL (ref 0–0.5)
EOSINOPHIL NFR BLD: 0 % (ref 0–8)
ERYTHROCYTE [DISTWIDTH] IN BLOOD BY AUTOMATED COUNT: 13.5 % (ref 11.5–14.5)
EST. GFR  (AFRICAN AMERICAN): >60 ML/MIN/1.73 M^2
EST. GFR  (NON AFRICAN AMERICAN): >60 ML/MIN/1.73 M^2
GLUCOSE SERPL-MCNC: 111 MG/DL (ref 70–110)
HCT VFR BLD AUTO: 45.8 % (ref 40–54)
HCV AB SERPL QL IA: NEGATIVE
HGB BLD-MCNC: 14.3 G/DL (ref 14–18)
HIV 1+2 AB+HIV1 P24 AG SERPL QL IA: NEGATIVE
IMM GRANULOCYTES # BLD AUTO: 0.09 K/UL (ref 0–0.04)
IMM GRANULOCYTES NFR BLD AUTO: 0.5 % (ref 0–0.5)
LYMPHOCYTES # BLD AUTO: 0.7 K/UL (ref 1–4.8)
LYMPHOCYTES NFR BLD: 4.3 % (ref 18–48)
MCH RBC QN AUTO: 29.7 PG (ref 27–31)
MCHC RBC AUTO-ENTMCNC: 31.2 G/DL (ref 32–36)
MCV RBC AUTO: 95 FL (ref 82–98)
MONOCYTES # BLD AUTO: 1.3 K/UL (ref 0.3–1)
MONOCYTES NFR BLD: 7.8 % (ref 4–15)
NEUTROPHILS # BLD AUTO: 15 K/UL (ref 1.8–7.7)
NEUTROPHILS NFR BLD: 87.3 % (ref 38–73)
NRBC BLD-RTO: 0 /100 WBC
PLATELET # BLD AUTO: 354 K/UL (ref 150–350)
PMV BLD AUTO: 10.1 FL (ref 9.2–12.9)
POTASSIUM SERPL-SCNC: 3.9 MMOL/L (ref 3.5–5.1)
RBC # BLD AUTO: 4.81 M/UL (ref 4.6–6.2)
SODIUM SERPL-SCNC: 143 MMOL/L (ref 136–145)
WBC # BLD AUTO: 17.14 K/UL (ref 3.9–12.7)

## 2021-03-17 PROCEDURE — 63700000 PHARM REV CODE 250 ALT 637 W/O HCPCS: Performed by: HOSPITALIST

## 2021-03-17 PROCEDURE — 80048 BASIC METABOLIC PNL TOTAL CA: CPT | Performed by: HOSPITALIST

## 2021-03-17 PROCEDURE — 85025 COMPLETE CBC W/AUTO DIFF WBC: CPT | Performed by: HOSPITALIST

## 2021-03-17 PROCEDURE — 94761 N-INVAS EAR/PLS OXIMETRY MLT: CPT

## 2021-03-17 PROCEDURE — 99900035 HC TECH TIME PER 15 MIN (STAT)

## 2021-03-17 PROCEDURE — 36415 COLL VENOUS BLD VENIPUNCTURE: CPT | Performed by: HOSPITALIST

## 2021-03-17 PROCEDURE — 63600175 PHARM REV CODE 636 W HCPCS: Performed by: HOSPITALIST

## 2021-03-17 PROCEDURE — 63600175 PHARM REV CODE 636 W HCPCS: Performed by: INTERNAL MEDICINE

## 2021-03-17 PROCEDURE — 25000003 PHARM REV CODE 250: Performed by: HOSPITALIST

## 2021-03-17 PROCEDURE — 11000001 HC ACUTE MED/SURG PRIVATE ROOM

## 2021-03-17 PROCEDURE — 86140 C-REACTIVE PROTEIN: CPT | Performed by: HOSPITALIST

## 2021-03-17 PROCEDURE — 27000221 HC OXYGEN, UP TO 24 HOURS

## 2021-03-17 PROCEDURE — 99233 SBSQ HOSP IP/OBS HIGH 50: CPT | Mod: ,,, | Performed by: INTERNAL MEDICINE

## 2021-03-17 PROCEDURE — 94640 AIRWAY INHALATION TREATMENT: CPT

## 2021-03-17 PROCEDURE — 25000003 PHARM REV CODE 250: Performed by: INTERNAL MEDICINE

## 2021-03-17 PROCEDURE — 25000242 PHARM REV CODE 250 ALT 637 W/ HCPCS: Performed by: HOSPITALIST

## 2021-03-17 PROCEDURE — 85379 FIBRIN DEGRADATION QUANT: CPT | Performed by: HOSPITALIST

## 2021-03-17 PROCEDURE — 25000242 PHARM REV CODE 250 ALT 637 W/ HCPCS: Performed by: INTERNAL MEDICINE

## 2021-03-17 PROCEDURE — S4991 NICOTINE PATCH NONLEGEND: HCPCS | Performed by: HOSPITALIST

## 2021-03-17 PROCEDURE — 99233 PR SUBSEQUENT HOSPITAL CARE,LEVL III: ICD-10-PCS | Mod: ,,, | Performed by: INTERNAL MEDICINE

## 2021-03-17 RX ORDER — FAMOTIDINE 20 MG/1
20 TABLET, FILM COATED ORAL 2 TIMES DAILY
Status: DISCONTINUED | OUTPATIENT
Start: 2021-03-17 | End: 2021-03-19 | Stop reason: HOSPADM

## 2021-03-17 RX ORDER — IBUPROFEN 200 MG
1 TABLET ORAL DAILY
Status: DISCONTINUED | OUTPATIENT
Start: 2021-03-17 | End: 2021-03-17 | Stop reason: SDUPTHER

## 2021-03-17 RX ORDER — FLUTICASONE FUROATE AND VILANTEROL 100; 25 UG/1; UG/1
1 POWDER RESPIRATORY (INHALATION) DAILY
Refills: 2 | Status: DISCONTINUED | OUTPATIENT
Start: 2021-03-17 | End: 2021-03-19 | Stop reason: HOSPADM

## 2021-03-17 RX ORDER — CEFTRIAXONE 1 G/1
1 INJECTION, POWDER, FOR SOLUTION INTRAMUSCULAR; INTRAVENOUS
Status: DISCONTINUED | OUTPATIENT
Start: 2021-03-17 | End: 2021-03-19 | Stop reason: HOSPADM

## 2021-03-17 RX ADMIN — CEFEPIME 1 G: 1 INJECTION, POWDER, FOR SOLUTION INTRAMUSCULAR; INTRAVENOUS at 05:03

## 2021-03-17 RX ADMIN — FLUTICASONE FUROATE AND VILANTEROL TRIFENATATE 1 PUFF: 100; 25 POWDER RESPIRATORY (INHALATION) at 12:03

## 2021-03-17 RX ADMIN — IPRATROPIUM BROMIDE AND ALBUTEROL SULFATE 3 ML: .5; 2.5 SOLUTION RESPIRATORY (INHALATION) at 09:03

## 2021-03-17 RX ADMIN — AZITHROMYCIN MONOHYDRATE 250 MG: 250 TABLET ORAL at 08:03

## 2021-03-17 RX ADMIN — IPRATROPIUM BROMIDE AND ALBUTEROL SULFATE 3 ML: .5; 2.5 SOLUTION RESPIRATORY (INHALATION) at 12:03

## 2021-03-17 RX ADMIN — LOSARTAN POTASSIUM 12.5 MG: 25 TABLET, FILM COATED ORAL at 12:03

## 2021-03-17 RX ADMIN — CEFTRIAXONE 1 G: 1 INJECTION, POWDER, FOR SOLUTION INTRAMUSCULAR; INTRAVENOUS at 12:03

## 2021-03-17 RX ADMIN — IPRATROPIUM BROMIDE AND ALBUTEROL SULFATE 3 ML: .5; 2.5 SOLUTION RESPIRATORY (INHALATION) at 03:03

## 2021-03-17 RX ADMIN — FAMOTIDINE 20 MG: 20 TABLET ORAL at 11:03

## 2021-03-17 RX ADMIN — LOSARTAN POTASSIUM 12.5 MG: 25 TABLET, FILM COATED ORAL at 08:03

## 2021-03-17 RX ADMIN — NICOTINE 1 PATCH: 14 PATCH TRANSDERMAL at 08:03

## 2021-03-17 RX ADMIN — FAMOTIDINE 20 MG: 20 TABLET ORAL at 09:03

## 2021-03-17 RX ADMIN — ENOXAPARIN SODIUM 40 MG: 40 INJECTION SUBCUTANEOUS at 05:03

## 2021-03-17 RX ADMIN — IPRATROPIUM BROMIDE AND ALBUTEROL SULFATE 3 ML: .5; 2.5 SOLUTION RESPIRATORY (INHALATION) at 08:03

## 2021-03-17 RX ADMIN — PREDNISONE 40 MG: 10 TABLET ORAL at 08:03

## 2021-03-17 RX ADMIN — METHYLPREDNISOLONE SODIUM SUCCINATE 60 MG: 40 INJECTION, POWDER, FOR SOLUTION INTRAMUSCULAR; INTRAVENOUS at 06:03

## 2021-03-18 VITALS
BODY MASS INDEX: 33.34 KG/M2 | DIASTOLIC BLOOD PRESSURE: 82 MMHG | HEIGHT: 68 IN | OXYGEN SATURATION: 94 % | SYSTOLIC BLOOD PRESSURE: 157 MMHG | WEIGHT: 220 LBS | RESPIRATION RATE: 20 BRPM | HEART RATE: 75 BPM | TEMPERATURE: 98 F

## 2021-03-18 LAB
ANION GAP SERPL CALC-SCNC: 8 MMOL/L (ref 8–16)
BASOPHILS # BLD AUTO: 0.03 K/UL (ref 0–0.2)
BASOPHILS NFR BLD: 0.2 % (ref 0–1.9)
BUN SERPL-MCNC: 14 MG/DL (ref 6–20)
CALCIUM SERPL-MCNC: 8.6 MG/DL (ref 8.7–10.5)
CHLORIDE SERPL-SCNC: 103 MMOL/L (ref 95–110)
CO2 SERPL-SCNC: 30 MMOL/L (ref 23–29)
CREAT SERPL-MCNC: 0.8 MG/DL (ref 0.5–1.4)
DIFFERENTIAL METHOD: ABNORMAL
EOSINOPHIL # BLD AUTO: 0 K/UL (ref 0–0.5)
EOSINOPHIL NFR BLD: 0 % (ref 0–8)
ERYTHROCYTE [DISTWIDTH] IN BLOOD BY AUTOMATED COUNT: 13.7 % (ref 11.5–14.5)
EST. GFR  (AFRICAN AMERICAN): >60 ML/MIN/1.73 M^2
EST. GFR  (NON AFRICAN AMERICAN): >60 ML/MIN/1.73 M^2
GLUCOSE SERPL-MCNC: 111 MG/DL (ref 70–110)
HCT VFR BLD AUTO: 48.5 % (ref 40–54)
HGB BLD-MCNC: 14.8 G/DL (ref 14–18)
IMM GRANULOCYTES # BLD AUTO: 0.08 K/UL (ref 0–0.04)
IMM GRANULOCYTES NFR BLD AUTO: 0.6 % (ref 0–0.5)
LYMPHOCYTES # BLD AUTO: 1 K/UL (ref 1–4.8)
LYMPHOCYTES NFR BLD: 6.6 % (ref 18–48)
MAGNESIUM SERPL-MCNC: 2.1 MG/DL (ref 1.6–2.6)
MCH RBC QN AUTO: 29 PG (ref 27–31)
MCHC RBC AUTO-ENTMCNC: 30.5 G/DL (ref 32–36)
MCV RBC AUTO: 95 FL (ref 82–98)
MONOCYTES # BLD AUTO: 1 K/UL (ref 0.3–1)
MONOCYTES NFR BLD: 6.6 % (ref 4–15)
NEUTROPHILS # BLD AUTO: 12.4 K/UL (ref 1.8–7.7)
NEUTROPHILS NFR BLD: 86 % (ref 38–73)
NRBC BLD-RTO: 0 /100 WBC
PLATELET # BLD AUTO: 317 K/UL (ref 150–350)
PMV BLD AUTO: 10.3 FL (ref 9.2–12.9)
POTASSIUM SERPL-SCNC: 4.4 MMOL/L (ref 3.5–5.1)
RBC # BLD AUTO: 5.1 M/UL (ref 4.6–6.2)
SODIUM SERPL-SCNC: 141 MMOL/L (ref 136–145)
WBC # BLD AUTO: 14.37 K/UL (ref 3.9–12.7)

## 2021-03-18 PROCEDURE — 36415 COLL VENOUS BLD VENIPUNCTURE: CPT | Performed by: HOSPITALIST

## 2021-03-18 PROCEDURE — 99900035 HC TECH TIME PER 15 MIN (STAT)

## 2021-03-18 PROCEDURE — 94640 AIRWAY INHALATION TREATMENT: CPT

## 2021-03-18 PROCEDURE — 85025 COMPLETE CBC W/AUTO DIFF WBC: CPT | Performed by: HOSPITALIST

## 2021-03-18 PROCEDURE — 27000221 HC OXYGEN, UP TO 24 HOURS

## 2021-03-18 PROCEDURE — 63700000 PHARM REV CODE 250 ALT 637 W/O HCPCS: Performed by: HOSPITALIST

## 2021-03-18 PROCEDURE — 80048 BASIC METABOLIC PNL TOTAL CA: CPT | Performed by: HOSPITALIST

## 2021-03-18 PROCEDURE — 63600175 PHARM REV CODE 636 W HCPCS: Performed by: INTERNAL MEDICINE

## 2021-03-18 PROCEDURE — 11000001 HC ACUTE MED/SURG PRIVATE ROOM

## 2021-03-18 PROCEDURE — 25000003 PHARM REV CODE 250: Performed by: INTERNAL MEDICINE

## 2021-03-18 PROCEDURE — 25000003 PHARM REV CODE 250: Performed by: HOSPITALIST

## 2021-03-18 PROCEDURE — 94761 N-INVAS EAR/PLS OXIMETRY MLT: CPT

## 2021-03-18 PROCEDURE — 25000242 PHARM REV CODE 250 ALT 637 W/ HCPCS: Performed by: INTERNAL MEDICINE

## 2021-03-18 PROCEDURE — S4991 NICOTINE PATCH NONLEGEND: HCPCS | Performed by: HOSPITALIST

## 2021-03-18 PROCEDURE — 25000242 PHARM REV CODE 250 ALT 637 W/ HCPCS: Performed by: HOSPITALIST

## 2021-03-18 PROCEDURE — 83735 ASSAY OF MAGNESIUM: CPT | Performed by: INTERNAL MEDICINE

## 2021-03-18 RX ORDER — ALBUTEROL SULFATE 90 UG/1
2 AEROSOL, METERED RESPIRATORY (INHALATION) EVERY 4 HOURS PRN
Qty: 18 G | Refills: 3 | Status: SHIPPED | OUTPATIENT
Start: 2021-03-18 | End: 2022-03-18

## 2021-03-18 RX ORDER — PREDNISONE 20 MG/1
40 TABLET ORAL DAILY
Qty: 6 TABLET | Refills: 0 | Status: SHIPPED | OUTPATIENT
Start: 2021-03-19 | End: 2021-03-22

## 2021-03-18 RX ORDER — LEVOFLOXACIN 500 MG/1
500 TABLET, FILM COATED ORAL DAILY
Qty: 3 TABLET | Refills: 0 | Status: SHIPPED | OUTPATIENT
Start: 2021-03-19 | End: 2021-03-22

## 2021-03-18 RX ORDER — LOSARTAN POTASSIUM 25 MG/1
12.5 TABLET ORAL DAILY
Qty: 45 TABLET | Refills: 3 | Status: SHIPPED | OUTPATIENT
Start: 2021-03-19 | End: 2022-03-19

## 2021-03-18 RX ADMIN — FAMOTIDINE 20 MG: 20 TABLET ORAL at 09:03

## 2021-03-18 RX ADMIN — METHYLPREDNISOLONE SODIUM SUCCINATE 60 MG: 40 INJECTION, POWDER, FOR SOLUTION INTRAMUSCULAR; INTRAVENOUS at 06:03

## 2021-03-18 RX ADMIN — AZITHROMYCIN MONOHYDRATE 250 MG: 250 TABLET ORAL at 09:03

## 2021-03-18 RX ADMIN — NICOTINE 1 PATCH: 14 PATCH TRANSDERMAL at 09:03

## 2021-03-18 RX ADMIN — CEFTRIAXONE 1 G: 1 INJECTION, POWDER, FOR SOLUTION INTRAMUSCULAR; INTRAVENOUS at 11:03

## 2021-03-18 RX ADMIN — IPRATROPIUM BROMIDE AND ALBUTEROL SULFATE 3 ML: .5; 2.5 SOLUTION RESPIRATORY (INHALATION) at 08:03

## 2021-03-18 RX ADMIN — LOSARTAN POTASSIUM 12.5 MG: 25 TABLET, FILM COATED ORAL at 09:03

## 2021-03-18 RX ADMIN — IPRATROPIUM BROMIDE AND ALBUTEROL SULFATE 3 ML: .5; 2.5 SOLUTION RESPIRATORY (INHALATION) at 01:03

## 2021-03-18 RX ADMIN — FLUTICASONE FUROATE AND VILANTEROL TRIFENATATE 1 PUFF: 100; 25 POWDER RESPIRATORY (INHALATION) at 08:03

## 2021-03-19 PROCEDURE — 99239 PR HOSPITAL DISCHARGE DAY,>30 MIN: ICD-10-PCS | Mod: ,,, | Performed by: INTERNAL MEDICINE

## 2021-03-19 PROCEDURE — 99239 HOSP IP/OBS DSCHRG MGMT >30: CPT | Mod: ,,, | Performed by: INTERNAL MEDICINE

## 2022-11-04 NOTE — SUBJECTIVE & OBJECTIVE
Interval History: NAEO. Despite orders for continuous BiPAP, patient intermittently removing BiPAP.  PCO2 remains elevated.  Importance of continuous BiPAP thoroughly discussed with patient at bedside on rounds.  Pulmonology consult given patient's poor response to BiPAP over the past 36 hr.     Review of Systems   Constitutional: Positive for fatigue. Negative for chills, diaphoresis and fever.   HENT: Negative for congestion.    Eyes: Negative for visual disturbance.   Respiratory: Positive for cough ( Nonproductive), shortness of breath ( Exertional and at rest) and wheezing. Negative for apnea, choking and chest tightness.    Cardiovascular: Negative for chest pain, palpitations and leg swelling.   Gastrointestinal: Negative for abdominal distention, abdominal pain, constipation, diarrhea and nausea.   Genitourinary: Negative for dysuria.   Musculoskeletal: Negative for back pain and myalgias.   Skin: Negative for rash.   Allergic/Immunologic: Negative for immunocompromised state.   Neurological: Positive for headaches. Negative for weakness.   Psychiatric/Behavioral: Negative for agitation.     Objective:     Vital Signs (Most Recent):  Temp: 98 °F (36.7 °C) (03/14/20 0804)  Pulse: 70 (03/14/20 1300)  Resp: (!) 22 (03/14/20 1300)  BP: 129/72 (03/14/20 1128)  SpO2: 96 % (03/14/20 1300) Vital Signs (24h Range):  Temp:  [98 °F (36.7 °C)-98.4 °F (36.9 °C)] 98 °F (36.7 °C)  Pulse:  [70-98] 70  Resp:  [16-30] 22  SpO2:  [91 %-96 %] 96 %  BP: (109-142)/(64-86) 129/72     Weight: 111.7 kg (246 lb 2.3 oz)  Body mass index is 36.35 kg/m².    Intake/Output Summary (Last 24 hours) at 3/14/2020 1355  Last data filed at 3/14/2020 0700  Gross per 24 hour   Intake 475 ml   Output --   Net 475 ml      Physical Exam   Constitutional: He is oriented to person, place, and time. He appears well-developed and well-nourished. No distress.   [place]be[place]   HENT:   Head: Normocephalic and atraumatic.   Mouth/Throat: No oropharyngeal exudate.    Eyes: Pupils are equal, round, and reactive to light. EOM are normal. No scleral icterus.   Neck: Normal range of motion. Neck supple.   Cardiovascular: Normal rate, regular rhythm and normal heart sounds.   No murmur heard.  Pulmonary/Chest: Effort normal. No stridor. No respiratory distress. He has wheezes. He has no rales. He exhibits no tenderness.   BiPAP  Speaking in complete sentences   Abdominal: Soft. Bowel sounds are normal. He exhibits no distension and no mass. There is no tenderness.   Musculoskeletal: Normal range of motion. He exhibits no edema.   Neurological: He is alert and oriented to person, place, and time.   Skin: Skin is warm. No rash noted. He is not diaphoretic.   Psychiatric: He has a normal mood and affect.       Significant Labs: All pertinent labs within the past 24 hours have been reviewed.    Significant Imaging: I have reviewed all pertinent imaging results/findings within the past 24 hours.   5

## 2022-11-17 NOTE — SUBJECTIVE & OBJECTIVE
Past Medical History:   Diagnosis Date    COPD (chronic obstructive pulmonary disease) 2/4/2020    Hypertension        Past Surgical History:   Procedure Laterality Date    HERNIA REPAIR      LEG SURGERY         Review of patient's allergies indicates:   Allergen Reactions    Codeine Rash    Penicillins Rash     Patient does not endorse any anaphylactic like reactions to PCN.  Tolerated a dose of Pip/tazo on 3/13/20       Family History     None        Tobacco Use    Smoking status: Current Every Day Smoker     Packs/day: 0.50     Types: Cigarettes    Smokeless tobacco: Never Used    Tobacco comment: 10 cigarettes per day   Substance and Sexual Activity    Alcohol use: Never     Frequency: Never     Comment: In [employer] program    Drug use: Not on file    Sexual activity: Never      Review of Systems   (please refer to attending/fellow note)  Objective:     Vital Signs (Most Recent):  Temp: 98 °F (36.7 °C) (03/14/20 0804)  Pulse: 70 (03/14/20 1300)  Resp: (!) 22 (03/14/20 1300)  BP: 129/72 (03/14/20 1128)  SpO2: 96 % (03/14/20 1300) Vital Signs (24h Range):  Temp:  [98 °F (36.7 °C)-98.4 °F (36.9 °C)] 98 °F (36.7 °C)  Pulse:  [70-98] 70  Resp:  [16-30] 22  SpO2:  [91 %-96 %] 96 %  BP: (109-142)/(64-86) 129/72   Weight: 111.7 kg (246 lb 2.3 oz)  Body mass index is 36.35 kg/m².      Intake/Output Summary (Last 24 hours) at 3/14/2020 1448  Last data filed at 3/14/2020 0700  Gross per 24 hour   Intake 475 ml   Output --   Net 475 ml       Physical Exam (please refer to attending/fellow note)    Vents:  Oxygen Concentration (%): 30 (03/14/20 1300)  Lines/Drains/Airways     Peripheral Intravenous Line                 Peripheral IV - Single Lumen 03/12/20 1016 20 G Left Hand 2 days              Significant Labs:    CBC/Anemia Profile:  Recent Labs   Lab 03/13/20  0439 03/14/20  0319   WBC 10.98 11.15   HGB 13.0* 13.3*   HCT 44.8 47.1    304   * 102*   RDW 14.2 14.2        Chemistries:  Recent  Labs   Lab 03/13/20 0438 03/14/20 0319    141   K 4.2 4.3    103   CO2 35* 30*   BUN 17 14   CREATININE 1.0 0.7   CALCIUM 7.7* 7.9*   ALBUMIN 2.8* 2.9*   PROT 5.9* 6.0   BILITOT 0.2 0.1   ALKPHOS 67 66   ALT 34 33   AST 21 17   MG 2.2 2.3   PHOS 4.4 3.0       A1C: No results for input(s): HGBA1C in the last 48 hours.  Blood Culture:   Recent Labs   Lab 03/12/20 1738   LABBLOO No Growth to date  No Growth to date  No Growth to date  No Growth to date     BMP:   Recent Labs   Lab 03/14/20 0319   GLU 93      K 4.3      CO2 30*   BUN 14   CREATININE 0.7   CALCIUM 7.9*   MG 2.3     CMP:   Recent Labs   Lab 03/13/20 0438 03/14/20 0319    141   K 4.2 4.3    103   CO2 35* 30*    93   BUN 17 14   CREATININE 1.0 0.7   CALCIUM 7.7* 7.9*   PROT 5.9* 6.0   ALBUMIN 2.8* 2.9*   BILITOT 0.2 0.1   ALKPHOS 67 66   AST 21 17   ALT 34 33   ANIONGAP 8 8   EGFRNONAA >60.0 >60.0     Coagulation:   Recent Labs   Lab 03/12/20 1738   INR 1.0     Lactic Acid: No results for input(s): LACTATE in the last 48 hours.  Lipid Panel: No results for input(s): CHOL, HDL, LDLCALC, TRIG, CHOLHDL in the last 48 hours.  Respiratory Culture:   Recent Labs   Lab 03/12/20  1534   GSRESP <10 epithelial cells per low power field.  Rare WBC's  Many Gram positive cocci  Few Gram positive rods  Rare Gram negative rods   RESPIRATORYC Normal respiratory eric  No S aureus or Pseudomonas isolated.     Troponin: No results for input(s): TROPONINI in the last 48 hours.  Urine Culture: No results for input(s): LABURIN in the last 48 hours.  Urine Studies: No results for input(s): COLORU, APPEARANCEUA, PHUR, SPECGRAV, PROTEINUA, GLUCUA, KETONESU, BILIRUBINUA, OCCULTUA, NITRITE, UROBILINOGEN, LEUKOCYTESUR, RBCUA, WBCUA, BACTERIA, SQUAMEPITHEL, HYALINECASTS in the last 48 hours.    Invalid input(s): EPIFANIOSUR  All pertinent labs within the past 24 hours have been reviewed.    Significant Imaging: I have reviewed  and interpreted all pertinent imaging results/findings within the past 24 hours.   gastrointestinal

## 2024-04-27 NOTE — EICU
Rounding (Video Assessment):  Yes    Intervention Initiated From:  COR / EICU    Comments: Video assessment done at this time; care provider with patient. Sitting up in bed; no distress noted, no needs at this time. currentl on 2L N/C with O2 sats 91%.    (2) good, crying